# Patient Record
Sex: FEMALE | Race: WHITE | NOT HISPANIC OR LATINO | Employment: FULL TIME | URBAN - METROPOLITAN AREA
[De-identification: names, ages, dates, MRNs, and addresses within clinical notes are randomized per-mention and may not be internally consistent; named-entity substitution may affect disease eponyms.]

---

## 2017-03-13 ENCOUNTER — TELEPHONE (OUTPATIENT)
Dept: OBGYN | Facility: CLINIC | Age: 27
End: 2017-03-13

## 2017-03-13 NOTE — TELEPHONE ENCOUNTER
Pt is past due for fu pap smear  Reminder letter was sent 2/20/17  LMTC and schedule at [facility]  Left this writers number in case of questions  If no reply and/or appt within two weeks (3/27/17) patient will be considered lost to pap tracking f/u.  Rayne Patton,   Pap Tracking

## 2018-11-01 ENCOUNTER — TRANSFERRED RECORDS (OUTPATIENT)
Dept: HEALTH INFORMATION MANAGEMENT | Facility: CLINIC | Age: 28
End: 2018-11-01

## 2019-03-01 LAB — RETINOPATHY: NORMAL

## 2019-05-12 ENCOUNTER — HOSPITAL ENCOUNTER (EMERGENCY)
Facility: CLINIC | Age: 29
Discharge: HOME | End: 2019-05-12
Attending: EMERGENCY MEDICINE | Admitting: EMERGENCY MEDICINE
Payer: COMMERCIAL

## 2019-05-12 VITALS
HEART RATE: 84 BPM | BODY MASS INDEX: 38.41 KG/M2 | TEMPERATURE: 98 F | OXYGEN SATURATION: 99 % | SYSTOLIC BLOOD PRESSURE: 128 MMHG | RESPIRATION RATE: 16 BRPM | WEIGHT: 210 LBS | DIASTOLIC BLOOD PRESSURE: 84 MMHG

## 2019-05-12 DIAGNOSIS — N10 ACUTE PYELONEPHRITIS: ICD-10-CM

## 2019-05-12 LAB
ALBUMIN UR-MCNC: 30 MG/DL
ANION GAP SERPL CALCULATED.3IONS-SCNC: 6 MMOL/L (ref 3–14)
APPEARANCE UR: ABNORMAL
BACTERIA #/AREA URNS HPF: ABNORMAL /HPF
BASOPHILS # BLD AUTO: 0 10E9/L (ref 0–0.2)
BASOPHILS NFR BLD AUTO: 0.1 %
BILIRUB UR QL STRIP: NEGATIVE
BUN SERPL-MCNC: 13 MG/DL (ref 7–30)
CALCIUM SERPL-MCNC: 8.7 MG/DL (ref 8.5–10.1)
CHLORIDE SERPL-SCNC: 105 MMOL/L (ref 94–109)
CO2 SERPL-SCNC: 23 MMOL/L (ref 20–32)
COLOR UR AUTO: YELLOW
CREAT SERPL-MCNC: 0.58 MG/DL (ref 0.52–1.04)
DIFFERENTIAL METHOD BLD: ABNORMAL
EOSINOPHIL # BLD AUTO: 0.3 10E9/L (ref 0–0.7)
EOSINOPHIL NFR BLD AUTO: 2.8 %
ERYTHROCYTE [DISTWIDTH] IN BLOOD BY AUTOMATED COUNT: 12.3 % (ref 10–15)
GFR SERPL CREATININE-BSD FRML MDRD: >90 ML/MIN/{1.73_M2}
GLUCOSE SERPL-MCNC: 229 MG/DL (ref 70–99)
GLUCOSE UR STRIP-MCNC: >499 MG/DL
HCG UR QL: NEGATIVE
HCT VFR BLD AUTO: 41 % (ref 35–47)
HGB BLD-MCNC: 13.2 G/DL (ref 11.7–15.7)
HGB UR QL STRIP: ABNORMAL
IMM GRANULOCYTES # BLD: 0.1 10E9/L (ref 0–0.4)
IMM GRANULOCYTES NFR BLD: 0.6 %
KETONES UR STRIP-MCNC: 5 MG/DL
LEUKOCYTE ESTERASE UR QL STRIP: ABNORMAL
LYMPHOCYTES # BLD AUTO: 2.7 10E9/L (ref 0.8–5.3)
LYMPHOCYTES NFR BLD AUTO: 30 %
MCH RBC QN AUTO: 25.7 PG (ref 26.5–33)
MCHC RBC AUTO-ENTMCNC: 32.2 G/DL (ref 31.5–36.5)
MCV RBC AUTO: 80 FL (ref 78–100)
MONOCYTES # BLD AUTO: 0.6 10E9/L (ref 0–1.3)
MONOCYTES NFR BLD AUTO: 6.3 %
MUCOUS THREADS #/AREA URNS LPF: PRESENT /LPF
NEUTROPHILS # BLD AUTO: 5.4 10E9/L (ref 1.6–8.3)
NEUTROPHILS NFR BLD AUTO: 60.2 %
NITRATE UR QL: NEGATIVE
NRBC # BLD AUTO: 0 10*3/UL
NRBC BLD AUTO-RTO: 0 /100
PH UR STRIP: 5 PH (ref 5–7)
PLATELET # BLD AUTO: 326 10E9/L (ref 150–450)
POTASSIUM SERPL-SCNC: 3.9 MMOL/L (ref 3.4–5.3)
RBC # BLD AUTO: 5.13 10E12/L (ref 3.8–5.2)
RBC #/AREA URNS AUTO: 4 /HPF (ref 0–2)
SODIUM SERPL-SCNC: 134 MMOL/L (ref 133–144)
SOURCE: ABNORMAL
SP GR UR STRIP: 1.04 (ref 1–1.03)
SQUAMOUS #/AREA URNS AUTO: 35 /HPF (ref 0–1)
UROBILINOGEN UR STRIP-MCNC: 0 MG/DL (ref 0–2)
WBC # BLD AUTO: 9 10E9/L (ref 4–11)
WBC #/AREA URNS AUTO: 30 /HPF (ref 0–5)

## 2019-05-12 PROCEDURE — 99283 EMERGENCY DEPT VISIT LOW MDM: CPT | Mod: Z6 | Performed by: EMERGENCY MEDICINE

## 2019-05-12 PROCEDURE — 81025 URINE PREGNANCY TEST: CPT | Performed by: EMERGENCY MEDICINE

## 2019-05-12 PROCEDURE — 85025 COMPLETE CBC W/AUTO DIFF WBC: CPT | Performed by: EMERGENCY MEDICINE

## 2019-05-12 PROCEDURE — 99283 EMERGENCY DEPT VISIT LOW MDM: CPT

## 2019-05-12 PROCEDURE — 80048 BASIC METABOLIC PNL TOTAL CA: CPT | Performed by: EMERGENCY MEDICINE

## 2019-05-12 PROCEDURE — 81001 URINALYSIS AUTO W/SCOPE: CPT | Performed by: EMERGENCY MEDICINE

## 2019-05-12 RX ORDER — CEPHALEXIN 500 MG/1
500 CAPSULE ORAL 3 TIMES DAILY
Qty: 30 CAPSULE | Refills: 0 | Status: SHIPPED | OUTPATIENT
Start: 2019-05-12 | End: 2019-06-10

## 2019-05-12 NOTE — ED PROVIDER NOTES
History     Chief Complaint   Patient presents with     Flank Pain     pt presents with R flank pain, sx for 2 days, pain worse with mvt, no hx of kidney stones, denies UTI sx.     HPI  Leigh Foreman is a 28 year old female who presents for right flank pain for the past 2 days.  History obtained from the patient.  She reports that 2 days ago she started having dull pain in the right flank, hurts to move, feels better when she is laying still.  Occasionally radiates down towards the right buttock.  Pain is mild to moderate and has been improved with ibuprofen.  No nausea or vomiting.  She denies fevers or chills.  She reports mild irritation with urination but no urinary frequency.  No vaginal bleeding or discharge.  She has had a prior left oophorectomy.  No other abdominal surgeries.  She denies rash, chest pain, difficulty breathing.    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 Plan [place]-  4/3/15: [place] Bx & ECC - atypia. Plan cotest in 1 year.   03/29/17 Would consider patient to be lost to follow-up.       CARDIOVASCULAR SCREENING; LDL GOAL LESS THAN 160 10/31/2010     Priority: Medium     Pilonidal abscess 05/10/2010     Priority: Medium     ACNE 11/02/2006     Priority: Medium     November 2, 2006: education, hand-outs given, start Neutrogena facial products, Medications: Retin A, EES.       left ovarian serous cystadenoma. 07/31/2006     Priority: Medium     -Left ovarian mass found on exam and confirmed by u/s and CT.  -referral to gyn/onc [phone]. Dr. Price downs to laporascopy removal of Left ovary, mass, and fallopian tube. Pathology showed left ovarian serous cystadenoma. Started on A[place]se.          Past Medical History:    Past Medical History:   Diagnosis Date     ASCUS with  positive high risk HPV 1/27/2015     H/O colposcopy with cervical biopsy 4/2015       Past Surgical History:    Past Surgical History:   Procedure Laterality Date     CYSTECTOMY PILONIDAL  5/17/2012    Procedure:CYSTECTOMY PILONIDAL; Pilonidal cystectomy; Surgeon:PHONG STALEY; Location:[place] OR     INCISION AND DRAINAGE, ABSCESS, SIMPLE      pilonidal abscess     SURGICAL HISTORY OF -   07/2006    rt ovary removed       Family History:    Family History   Problem Relation Age of Onset     Diabetes Father      Hypertension Father      Cardiovascular Maternal Grandfather         MI 42     Diabetes Paternal Grandmother      Hypertension Paternal Grandmother        Social History:  Marital Status:  Single [1]  Social History     Tobacco Use     Smoking status: Never Smoker     Smokeless tobacco: Never Used   Substance Use Topics     Alcohol use: No     Drug use: No        Medications:      cephALEXin (KEFLEX) 500 MG capsule   azithromycin (ZITHROMAX Z-JUAN J) 250 MG tablet   benzonatate (TESSALON PERLES) 100 MG capsule   levonorgestrel-ethinyl estradiol (AVIANE,ALESSE,LESSINA) 0.1-20 MG-MCG per tablet         Review of Systems  Pertinent positives and negatives listed in the HPI, all other systems reviewed and are negative.    Physical Exam   BP: 132/88  Pulse: 90  Temp: 98  F (36.7  C)  Resp: 16  Weight: 95.3 kg (210 lb)  SpO2: 99 %      Physical Exam   Constitutional: She is oriented to person, place, and time. She appears well-developed and well-nourished. She appears distressed.   HENT:   Head: Normocephalic and atraumatic.   Right Ear: External ear normal.   Left Ear: External ear normal.   Nose: Nose normal.   Eyes: Conjunctivae are normal. No scleral icterus.   Neck: Normal range of motion.   Cardiovascular: Normal rate and regular rhythm.   Pulmonary/Chest: Effort normal. No stridor. No respiratory distress.   Abdominal: Soft. She exhibits no distension and no mass. There is no tenderness. There is no guarding.    Neurological: She is alert and oriented to person, place, and time.   Skin: Skin is warm and dry. She is not diaphoretic.   Psychiatric: She has a normal mood and affect. Her behavior is normal.   Nursing note and vitals reviewed.      ED Course        Procedures               Critical Care time:  none               Results for orders placed or performed during the hospital encounter of 05/12/19 (from the past 24 hour(s))   UA reflex to Microscopic   Result Value Ref Range    Color Urine Yellow     Appearance Urine Cloudy     Glucose Urine >499 (A) NEG^Negative mg/dL    Bilirubin Urine Negative NEG^Negative    Ketones Urine 5 (A) NEG^Negative mg/dL    Specific Gravity Urine 1.037 (H) 1.003 - 1.035    Blood Urine Moderate (A) NEG^Negative    pH Urine 5.0 5.0 - 7.0 pH    Protein Albumin Urine 30 (A) NEG^Negative mg/dL    Urobilinogen mg/dL 0.0 0.0 - 2.0 mg/dL    Nitrite Urine Negative NEG^Negative    Leukocyte Esterase Urine Moderate (A) NEG^Negative    Source Midstream Urine     RBC Urine 4 (H) 0 - 2 /HPF    WBC Urine 30 (H) 0 - 5 /HPF    Bacteria Urine Few (A) NEG^Negative /HPF    Squamous Epithelial /HPF Urine 35 (H) 0 - 1 /HPF    Mucous Urine Present (A) NEG^Negative /LPF   HCG qualitative urine   Result Value Ref Range    HCG Qual Urine Negative NEG^Negative   CBC with platelets differential   Result Value Ref Range    WBC 9.0 4.0 - 11.0 10e9/L    RBC Count 5.13 3.8 - 5.2 10e12/L    Hemoglobin 13.2 11.7 - 15.7 g/dL    Hematocrit 41.0 35.0 - 47.0 %    MCV 80 78 - 100 fl    MCH 25.7 (L) 26.5 - 33.0 pg    MCHC 32.2 31.5 - 36.5 g/dL    RDW 12.3 10.0 - 15.0 %    Platelet Count 326 150 - 450 10e9/L    Diff Method Automated Method     % Neutrophils 60.2 %    % Lymphocytes 30.0 %    % Monocytes 6.3 %    % Eosinophils 2.8 %    % Basophils 0.1 %    % Immature Granulocytes 0.6 %    Nucleated RBCs 0 0 /100    Absolute Neutrophil 5.4 1.6 - 8.3 10e9/L    Absolute Lymphocytes 2.7 0.8 - 5.3 10e9/L    Absolute Monocytes 0.6 0.0  - 1.3 10e9/L    Absolute Eosinophils 0.3 0.0 - 0.7 10e9/L    Absolute Basophils 0.0 0.0 - 0.2 10e9/L    Abs Immature Granulocytes 0.1 0 - 0.4 10e9/L    Absolute Nucleated RBC 0.0    Basic metabolic panel   Result Value Ref Range    Sodium 134 133 - 144 mmol/L    Potassium 3.9 3.4 - 5.3 mmol/L    Chloride 105 94 - 109 mmol/L    Carbon Dioxide 23 20 - 32 mmol/L    Anion Gap 6 3 - 14 mmol/L    Glucose 229 (H) 70 - 99 mg/dL    Urea Nitrogen 13 7 - 30 mg/dL    Creatinine 0.58 0.52 - 1.04 mg/dL    GFR Estimate >90 >60 mL/min/[1.73_m2]    GFR Estimate If Black >90 >60 mL/min/[1.73_m2]    Calcium 8.7 8.5 - 10.1 mg/dL       Medications - No data to display    Assessments & Plan (with Medical Decision Making)   28-year-old female presents with right flank pain.  Heart rate 90, temperature is 98  F, SPO2 is 99% on room air.  Abdominal exam is benign and not concerning for an acute surgical process such as appendicitis.  White blood cell count is 9.  Hemoglobin is 13.2.  Urine protein test is negative, unlikely ectopic pregnancy.  Urinalysis positive for white blood cells and leukocyte esterase.  Electrolytes are within normal limits.  On recheck she is feeling well and tolerating oral intake.  She possibly is having symptoms related to pyelonephritis given the white blood cells in the urine and the mild dysuria.  She is safe to discharge to home with medications for treatment of this.  She is started on cephalexin and is discharged with instructions to return if she has worsening symptoms or other concerns, otherwise follow-up in clinic.  The patient is in agreement with this plan.    I have reviewed the nursing notes.    I have reviewed the findings, diagnosis, plan and need for follow up with the patient.          Medication List      Started    cephALEXin 500 MG capsule  Commonly known as:  KEFLEX  500 mg, Oral, 3 TIMES DAILY            Final diagnoses:   Acute pyelonephritis       5/12/2019   [facility]  [facility]     Supa Peraza MD  05/12/19 0902

## 2019-05-12 NOTE — ED AVS SNAPSHOT
[facility] Emergency Department  [address] [address]  Phone:  [phone]  Fax:  [phone]                                    Leigh Foreman   MRN: [id number]    Department:  [facility] Emergency Department   Date of Visit:  5/12/2019           After Visit Summary Signature Page    I have received my discharge instructions, and my questions have been answered. I have discussed any challenges I see with this plan with the nurse or doctor.    ..........................................................................................................................................  Patient/Patient Representative Signature      ..........................................................................................................................................  Patient Representative Print Name and Relationship to Patient    ..................................................               ................................................  Date                                   Time    ..........................................................................................................................................  Reviewed by Signature/Title    ...................................................              ..............................................  Date                                               Time          22EPIC Rev 08/18

## 2019-05-12 NOTE — DISCHARGE INSTRUCTIONS
Take the antibiotics as prescribed for the next 10 days.  Use acetaminophen and ibuprofen for discomfort.  Return to the emergency department if you have repeated vomiting, severe symptoms, or other concerns.  Otherwise follow-up in clinic if not starting to improve and 4-5 days.

## 2019-05-12 NOTE — ED NOTES
"Onset on Friday  Intermittent right flank pain  Nausea no emesis  No diarrhea  Pain \" comes and goes \"  Bowel sounds present   Labs are sent  "

## 2019-06-06 ENCOUNTER — OFFICE VISIT (OUTPATIENT)
Dept: FAMILY MEDICINE | Facility: CLINIC | Age: 29
End: 2019-06-06
Payer: COMMERCIAL

## 2019-06-06 VITALS
HEIGHT: 64 IN | BODY MASS INDEX: 40.49 KG/M2 | HEART RATE: 98 BPM | DIASTOLIC BLOOD PRESSURE: 76 MMHG | OXYGEN SATURATION: 99 % | RESPIRATION RATE: 16 BRPM | TEMPERATURE: 98.7 F | WEIGHT: 237.2 LBS | SYSTOLIC BLOOD PRESSURE: 132 MMHG

## 2019-06-06 DIAGNOSIS — R81 GLUCOSE FOUND IN URINE ON EXAMINATION: ICD-10-CM

## 2019-06-06 DIAGNOSIS — Z87.448 HX OF ACUTE PYELONEPHRITIS: ICD-10-CM

## 2019-06-06 DIAGNOSIS — E11.9 TYPE 2 DIABETES MELLITUS WITHOUT COMPLICATION, WITHOUT LONG-TERM CURRENT USE OF INSULIN (H): ICD-10-CM

## 2019-06-06 DIAGNOSIS — R10.9 RIGHT FLANK PAIN: ICD-10-CM

## 2019-06-06 DIAGNOSIS — E11.9 DIABETES MELLITUS, NEW ONSET (H): Primary | ICD-10-CM

## 2019-06-06 LAB
ALBUMIN UR-MCNC: NEGATIVE MG/DL
APPEARANCE UR: CLEAR
BILIRUB UR QL STRIP: NEGATIVE
COLOR UR AUTO: YELLOW
GLUCOSE BLD-MCNC: 251 MG/DL (ref 70–99)
GLUCOSE UR STRIP-MCNC: >=1000 MG/DL
HBA1C MFR BLD: 9.3 % (ref 0–5.6)
HGB UR QL STRIP: NEGATIVE
KETONES UR STRIP-MCNC: NEGATIVE MG/DL
LEUKOCYTE ESTERASE UR QL STRIP: NEGATIVE
NITRATE UR QL: NEGATIVE
PH UR STRIP: 6 PH (ref 5–7)
SOURCE: ABNORMAL
SP GR UR STRIP: 1.02 (ref 1–1.03)
UROBILINOGEN UR STRIP-ACNC: 0.2 EU/DL (ref 0.2–1)

## 2019-06-06 PROCEDURE — 83036 HEMOGLOBIN GLYCOSYLATED A1C: CPT | Performed by: NURSE PRACTITIONER

## 2019-06-06 PROCEDURE — 82947 ASSAY GLUCOSE BLOOD QUANT: CPT | Performed by: NURSE PRACTITIONER

## 2019-06-06 PROCEDURE — 99215 OFFICE O/P EST HI 40 MIN: CPT | Performed by: NURSE PRACTITIONER

## 2019-06-06 PROCEDURE — 36415 COLL VENOUS BLD VENIPUNCTURE: CPT | Performed by: NURSE PRACTITIONER

## 2019-06-06 PROCEDURE — 81003 URINALYSIS AUTO W/O SCOPE: CPT | Performed by: NURSE PRACTITIONER

## 2019-06-06 PROCEDURE — 87086 URINE CULTURE/COLONY COUNT: CPT | Performed by: NURSE PRACTITIONER

## 2019-06-06 RX ORDER — METFORMIN HCL 500 MG
500 TABLET, EXTENDED RELEASE 24 HR ORAL
Qty: 60 TABLET | Refills: 0 | Status: SHIPPED | OUTPATIENT
Start: 2019-06-06 | End: 2019-06-07

## 2019-06-06 ASSESSMENT — MIFFLIN-ST. JEOR: SCORE: 1784.34

## 2019-06-06 NOTE — PROGRESS NOTES
"Subjective     Leigh Foreman is a 28 year old female who presents to clinic today for the following health issues:    HPI   URINARY TRACT SYMPTOMS      Duration: last night     Description  Flank pain     Intensity:  mild    Accompanying signs and symptoms:  Fever/chills: no   Flank pain YES- right   Nausea and vomiting: no   Vaginal symptoms: none  Abdominal/Pelvic Pain: no     History  History of frequent UTI's: YES- pyelonephritis on 5/12/19; pt was in the ED  History of kidney stones: no   Sexually Active: YES  Possibility of pregnancy: \"Possible but I doubt it\", as stated by the patient.     Precipitating or alleviating factors: None    Therapies tried and outcome: ibuprofen and Tylenol, icyhot         Patient Active Problem List   Diagnosis     left ovarian serous cystadenoma.     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     Diabetes mellitus, type 2 (H)     Past Surgical History:   Procedure Laterality Date     CYSTECTOMY PILONIDAL  5/17/2012    Procedure:CYSTECTOMY PILONIDAL; Pilonidal cystectomy; Surgeon:PHONG STALEY; Location:[place] OR     INCISION AND DRAINAGE, ABSCESS, SIMPLE      pilonidal abscess     SURGICAL HISTORY OF -   07/2006    rt ovary removed       Social History     Tobacco Use     Smoking status: Never Smoker     Smokeless tobacco: Never Used   Substance Use Topics     Alcohol use: No     Family History   Problem Relation Age of Onset     Diabetes Father      Hypertension Father      Cardiovascular Maternal Grandfather         MI 42     Diabetes Paternal Grandmother      Hypertension Paternal Grandmother          Current Outpatient Medications   Medication Sig Dispense Refill     metFORMIN (GLUCOPHAGE-XR) 500 MG 24 hr tablet Take 1 tablet (500 mg) by mouth daily (with dinner) 60 tablet 0     azithromycin (ZITHROMAX Z-JUAN J) 250 MG tablet Take 2 tablets on day 1 and then take 1 tablet days 2-5 (Patient not taking: " "Reported on 6/6/2019) 6 tablet 0     benzonatate (TESSALON PERLES) 100 MG capsule Take 1 capsule (100 mg) by mouth 3 times daily as needed for cough (Patient not taking: Reported on 6/6/2019) 42 capsule 0     levonorgestrel-ethinyl estradiol (AVIANE,ALESSE,LESSINA) 0.1-20 MG-MCG per tablet Take 1 tablet by mouth daily (Needs follow-up appointment for this medication in April) (Patient not taking: Reported on 6/6/2019) 90 tablet 0     Allergies   Allergen Reactions     Nkda [No Known Drug Allergies]          Reviewed and updated as needed this visit by Provider  Tobacco  Allergies  Meds  Problems  Med Hx  Surg Hx  Fam Hx         Review of Systems   ROS COMP: Constitutional, HEENT, cardiovascular, pulmonary, GI, , musculoskeletal, neuro, skin, endocrine and psych systems are negative, except as otherwise noted.      Objective    /76   Pulse 98   Temp 98.7  F (37.1  C) (Tympanic)   Resp 16   Ht 1.623 m (5' 3.9\")   Wt 107.6 kg (237 lb 3.2 oz)   LMP 05/22/2019 (Exact Date)   SpO2 99%   BMI 40.84 kg/m    Body mass index is 40.84 kg/m .  Physical Exam   GENERAL: 27 y/o overweight WF, alert and no distress, nontoxic in appearance  EYES: Eyes grossly normal to inspection, PERRL and conjunctivae and sclerae normal  HENT: ear canals and TM's normal, nose and mouth without ulcers or lesions  NECK: no adenopathy, supple with full ROM  RESP: lungs clear to auscultation - no rales, rhonchi or wheezes  CV: regular rate and rhythm, normal S1 S2, no S3 or S4, no murmur, click or rub, no peripheral edema and peripheral pulses strong  ABDOMEN: soft, nontender, no hepatosplenomegaly, no masses and bowel sounds normal  MS: no gross musculoskeletal defects noted, no edema  No rash    Diagnostic Test Results:  Labs reviewed in Epic  Results for orders placed or performed in visit on 06/06/19 (from the past 24 hour(s))   *UA reflex to Microscopic and Culture ([place] [facility] (except [place] and [place]) "   Result Value Ref Range    Color Urine Yellow     Appearance Urine Clear     Glucose Urine >=1000 (A) NEG^Negative mg/dL    Bilirubin Urine Negative NEG^Negative    Ketones Urine Negative NEG^Negative mg/dL    Specific Gravity Urine 1.020 1.003 - 1.035    Blood Urine Negative NEG^Negative    pH Urine 6.0 5.0 - 7.0 pH    Protein Albumin Urine Negative NEG^Negative mg/dL    Urobilinogen Urine 0.2 0.2 - 1.0 EU/dL    Nitrite Urine Negative NEG^Negative    Leukocyte Esterase Urine Negative NEG^Negative    Source Midstream Urine    Hemoglobin A1c   Result Value Ref Range    Hemoglobin A1C 9.3 (H) 0 - 5.6 %   Glucose, whole blood   Result Value Ref Range    Glucose Whole Blood 251 (H) 70 - 99 mg/dL           Assessment & Plan   Problem List Items Addressed This Visit     Diabetes mellitus, type 2 (H)    Relevant Medications    metFORMIN (GLUCOPHAGE-XR) 500 MG 24 hr tablet      Other Visit Diagnoses     Diabetes mellitus, new onset (H)    -  Primary    Relevant Medications    metFORMIN (GLUCOPHAGE-XR) 500 MG 24 hr tablet    Other Relevant Orders    DIABETES EDUCATOR REFERRAL    Right flank pain        Relevant Orders    *UA reflex to Microscopic and Culture ([place] [facility] (except [place] and [place]) (Completed)    Glucose found in urine on examination        Relevant Orders    Hemoglobin A1c (Completed)    Glucose, whole blood (Completed)    Hx of acute pyelonephritis        Relevant Orders    Urine Culture Aerobic Bacterial (Completed)         Discussed case with Enedina De Leon, diabetic educator and she can see her tomorrow. Will get her started on metformin tonight at dinner time (her creatinine normal in ER in May) and she will see Anette Fung NP, on Monday for follow up and further plan of care. Pt's father and grandmother are diabetic so she is a bit familiar with this disease process. We also discussed that weight loss can also help greatly with management of DM. Culturing urine just to make sure there  is nothing growing since it's been less than a month that she had pyelonephritis.  If her flank pain worsens she is to go immediately to the ER for further evaluation.      Patient Instructions   Start your Metformin as directed.    You are scheduled to see Enedina De Leon, Diabetic Educator, in [facility] tomorrow, 6-7-19 at 11:00 am. It is very important you see her for instructions and also for glucose monitor and instruction.    You are also scheduled to see Anette Fung NP, on Monday, 6-10-19 at 1:40 pm in the [facility].    Follow-up with your primary care provider next week and as needed.    Indications for emergent return to emergency department discussed with patient, who verbalized good understanding and agreement.  Patient understands the limitations of today's evaluation.         Patient Education     Diet: Diabetes  Food is an important tool that you can use to control diabetes and stay healthy. Eating well-balanced meals in the correct amounts will help you control your blood glucose levels and prevent low blood sugar reactions. It will also help you reduce the health risks of diabetes. There is no one specific diet that is right for everyone with diabetes. But there are general guidelines to follow. A registered dietitian (RD) will create a tailored diet approach that s just right for you. He or she will also help you plan healthy meals and snacks. If you have any questions, call your dietitian for advice.     Guidelines for success  Talk with your healthcare provider before starting a diabetes diet or weight loss program. If you haven't talked with a dietitian yet, ask your provider for a referral. The following guidelines can help you succeed:    Select foods from the 6 food groups below. Your dietitian will help you find food choices within each group. He or she will also show you serving sizes and how many servings you can have at each meal.  ? Grains, beans, and starchy  vegetables  ? Vegetables  ? Fruit  ? Milk or yogurt  ? Meat, poultry, fish, or tofu  ? Healthy fats    Check your blood sugar levels as directed by your provider. Take any medicine as prescribed by your provider.    Learn to read food labels and pick the right portion sizes.    Eat only the amount of food in your meal plan. Eat about the same amount of food at regular times each day. Don t skip meals. Eat meals 4 to 5 hours apart, with snacks in between.    Limit alcohol. It raises blood sugar levels. Drink water or calorie-free diet drinks that use safe sweeteners.    Eat less fat to help lower your risk of heart disease. Use nonfat or low-fat dairy products and lean meats. Avoid fried foods. Use cooking oils that are unsaturated, such as olive, canola, or peanut oil.    Talk with your dietitian about safe sugar substitutes.    Avoid added salt. It can contribute to high blood pressure, which can cause heart disease. People with diabetes already have a risk of high blood pressure and heart disease.    Stay at a healthy weight. If you need to lose weight, cut down on your portion sizes. But don t skip meals. Exercise is an important part of any weight management program. Talk with your provider about an exercise program that s right for you.    For more information about the best diet plan for you, talk with a registered dietitian (RD). To find an RD in your area, contact:  ? Academy of Nutrition and Dietetics www.eatright.org  ? The American Diabetes Association [phone] www.diabetes.org  Date Last Reviewed: 8/1/2016 2000-2018 [employer]. [address] [place] [address]. All rights reserved. This information is not intended as a substitute for professional medical care. Always follow your healthcare professional's instructions.             Return in about 1 day (around 6/7/2019) for diabetic educator.  45 minutes spent with this patient greater than 50% in face to face counseling  regarding the above.    PARAMJIT Redman [facility]

## 2019-06-06 NOTE — LETTER
June 6, 2019      Leigh Foreman  [address]  [place] [address]        To Whom It May Concern:    Leigh Foreman was seen in our clinic. Please release her from work tomorrow and Monday for medical appointments which are needed for health reasons.      Sincerely,        PARAMJIT Redman CNP

## 2019-06-06 NOTE — PATIENT INSTRUCTIONS
Start your Metformin as directed.    You are scheduled to see Enedina De Leon, Diabetic Educator, in [facility] tomorrow, 6-7-19 at 11:00 am. It is very important you see her for instructions and also for glucose monitor and instruction.    You are also scheduled to see Anette Fung NP, on Monday, 6-10-19 at 1:40 pm in the [facility].    Follow-up with your primary care provider next week and as needed.    Indications for emergent return to emergency department discussed with patient, who verbalized good understanding and agreement.  Patient understands the limitations of today's evaluation.         Patient Education     Diet: Diabetes  Food is an important tool that you can use to control diabetes and stay healthy. Eating well-balanced meals in the correct amounts will help you control your blood glucose levels and prevent low blood sugar reactions. It will also help you reduce the health risks of diabetes. There is no one specific diet that is right for everyone with diabetes. But there are general guidelines to follow. A registered dietitian (RD) will create a tailored diet approach that s just right for you. He or she will also help you plan healthy meals and snacks. If you have any questions, call your dietitian for advice.     Guidelines for success  Talk with your healthcare provider before starting a diabetes diet or weight loss program. If you haven't talked with a dietitian yet, ask your provider for a referral. The following guidelines can help you succeed:    Select foods from the 6 food groups below. Your dietitian will help you find food choices within each group. He or she will also show you serving sizes and how many servings you can have at each meal.  ? Grains, beans, and starchy vegetables  ? Vegetables  ? Fruit  ? Milk or yogurt  ? Meat, poultry, fish, or tofu  ? Healthy fats    Check your blood sugar levels as directed by your provider. Take any medicine as prescribed by your  provider.    Learn to read food labels and pick the right portion sizes.    Eat only the amount of food in your meal plan. Eat about the same amount of food at regular times each day. Don t skip meals. Eat meals 4 to 5 hours apart, with snacks in between.    Limit alcohol. It raises blood sugar levels. Drink water or calorie-free diet drinks that use safe sweeteners.    Eat less fat to help lower your risk of heart disease. Use nonfat or low-fat dairy products and lean meats. Avoid fried foods. Use cooking oils that are unsaturated, such as olive, canola, or peanut oil.    Talk with your dietitian about safe sugar substitutes.    Avoid added salt. It can contribute to high blood pressure, which can cause heart disease. People with diabetes already have a risk of high blood pressure and heart disease.    Stay at a healthy weight. If you need to lose weight, cut down on your portion sizes. But don t skip meals. Exercise is an important part of any weight management program. Talk with your provider about an exercise program that s right for you.    For more information about the best diet plan for you, talk with a registered dietitian (RD). To find an RD in your area, contact:  ? Academy of Nutrition and Dietetics www.eatright.org  ? The American Diabetes Association 321-446-1937 www.diabetes.org  Date Last Reviewed: 8/1/2016 2000-2018 [employer]. [address] [place] [address], [place] [address]. All rights reserved. This information is not intended as a substitute for professional medical care. Always follow your healthcare professional's instructions.

## 2019-06-06 NOTE — NURSING NOTE
"Chief Complaint   Patient presents with     UTI       Initial /76   Pulse 98   Temp 98.7  F (37.1  C) (Tympanic)   Resp 16   Ht 1.623 m (5' 3.9\")   Wt 107.6 kg (237 lb 3.2 oz)   LMP 05/22/2019 (Exact Date)   SpO2 99%   BMI 40.84 kg/m   Estimated body mass index is 40.84 kg/m  as calculated from the following:    Height as of this encounter: 1.623 m (5' 3.9\").    Weight as of this encounter: 107.6 kg (237 lb 3.2 oz).    Patient presents to the clinic using No DME    Health Maintenance that is potentially due pending provider review:  Pap Smear    Notified patient of due pap. Patient will follow up in the future.    Is there anyone who you would like to be able to receive your results? No  If yes have patient fill out HALIE      "

## 2019-06-07 ENCOUNTER — OFFICE VISIT (OUTPATIENT)
Dept: EDUCATION SERVICES | Facility: CLINIC | Age: 29
End: 2019-06-07
Payer: COMMERCIAL

## 2019-06-07 DIAGNOSIS — E11.9 DIABETES MELLITUS, NEW ONSET (H): ICD-10-CM

## 2019-06-07 DIAGNOSIS — E11.9 TYPE 2 DIABETES MELLITUS WITHOUT COMPLICATION, WITHOUT LONG-TERM CURRENT USE OF INSULIN (H): Primary | ICD-10-CM

## 2019-06-07 LAB
BACTERIA SPEC CULT: NORMAL
Lab: NORMAL
SPECIMEN SOURCE: NORMAL

## 2019-06-07 PROCEDURE — G0108 DIAB MANAGE TRN  PER INDIV: HCPCS | Performed by: DIETITIAN, REGISTERED

## 2019-06-07 RX ORDER — LANCETS
EACH MISCELLANEOUS
Qty: 102 EACH | Refills: 11 | Status: SHIPPED | OUTPATIENT
Start: 2019-06-07

## 2019-06-07 RX ORDER — METFORMIN HCL 500 MG
TABLET, EXTENDED RELEASE 24 HR ORAL
Qty: 270 TABLET | Refills: 1 | Status: SHIPPED | OUTPATIENT
Start: 2019-06-07 | End: 2019-06-07

## 2019-06-07 RX ORDER — METFORMIN HCL 500 MG
TABLET, EXTENDED RELEASE 24 HR ORAL
Qty: 270 TABLET | Refills: 1 | Status: SHIPPED | OUTPATIENT
Start: 2019-06-07 | End: 2019-07-23

## 2019-06-07 NOTE — PROGRESS NOTES
"Diabetes Self-Management Education & Support    Diabetes Education Self Management & Training    SUBJECTIVE/OBJECTIVE:  Presents for: Initial Assessment for new diagnosis  Accompanied by: Self  Diabetes education in the past 24mo: No  Focus of Visit: Patient Unsure  Diabetes type: Type 2  Date of diagnosis: yesterday 6/6/19  Transportation concerns: No  Other concerns:: None  Cultural Influences/Ethnic Background:  American      Diabetes Symptoms & Complications          Patient Problem List and Family Medical History reviewed for relevant medical history, current medical status, and diabetes risk factors.    Vitals:  LMP 05/22/2019 (Exact Date)   Estimated body mass index is 40.84 kg/m  as calculated from the following:    Height as of 6/6/19: 1.623 m (5' 3.9\").    Weight as of 6/6/19: 107.6 kg (237 lb 3.2 oz).   Last 3 BP:   BP Readings from Last 3 Encounters:   06/06/19 132/76   05/12/19 128/84   09/25/16 128/79       History   Smoking Status     Never Smoker   Smokeless Tobacco     Never Used       Labs:  Lab Results   Component Value Date    A1C 9.3 06/06/2019     Lab Results   Component Value Date     05/12/2019     No results found for: LDL  No results found for: HDL]  GFR Estimate   Date Value Ref Range Status   05/12/2019 >90 >60 mL/min/[1.73_m2] Final     Comment:     Non  GFR Calc  Starting 12/18/2018, serum creatinine based estimated GFR (eGFR) will be   calculated using the Chronic Kidney Disease Epidemiology Collaboration   (CKD-EPI) equation.       GFR Estimate If Black   Date Value Ref Range Status   05/12/2019 >90 >60 mL/min/[1.73_m2] Final     Comment:      GFR Calc  Starting 12/18/2018, serum creatinine based estimated GFR (eGFR) will be   calculated using the Chronic Kidney Disease Epidemiology Collaboration   (CKD-EPI) equation.       Lab Results   Component Value Date    CR 0.58 05/12/2019     No results found for: MICROALBUMIN    Healthy Eating  Healthy " Eating Assessed Today: Yes  Cultural/Quaker diet restrictions?: No  Breakfast: usually dont eat anything or a small bowl of cereal or a poptart  Lunch: salad or a sandwich or taco, water  Dinner: eggs and canales or chicken, asparagus or steak with broccoli. makes noodles occasionally, baked occasionally, skim milk 12 oz or water   Snacks: chips and salsa, string cheese  Other: 1 can every day or every 2  Beverages: Water, Milk  Has patient met with a dietitian in the past?: No    Being Active  Being Active Assessed Today: Yes  Exercise:: Yes(walking, has a gym membership)  Days per week of moderate to strenuous exercise (like a brisk walk): 2  On average, minutes per day of exercise at this level: 60  How intense was your typical exercise? : Moderate (like brisk walking)  Exercise Minutes per Week: 120  Barrier to exercise: None    Monitoring  Monitoring Assessed Today: Yes    187 fasting this am    Taking Medications  Diabetes Medication(s)     Biguanides       metFORMIN (GLUCOPHAGE-XR) 500 MG 24 hr tablet    Take 1 tablet (500 mg) by mouth daily (with dinner)          Taking Medication Assessed Today: Yes    Problem Solving          Not assessed       Reducing Risks   not assessed    Healthy Coping     Patient Activation Measure Survey Score:  ESTUARDO Score (Last Two) 1/15/2015   ESTUARDO Raw Score 36   Activation Score 47.4   ESTUARDO Level 2       ASSESSMENT:  New diabetic.  Basic information covered, meal planning.  She is just getting used to dx, was yesterday dx'd.         Patient's most recent   Lab Results   Component Value Date    A1C 9.3 06/06/2019    is not meeting goal of <7.0    INTERVENTION:   Diabetes knowledge and skills assessment:     Patient is knowledgeable in diabetes management concepts related to: new    Patient needs further education on the following diabetes management concepts: Healthy Eating, Being Active, Monitoring, Taking Medication, Problem Solving, Reducing Risks and Healthy Coping    Based on  learning assessment above, most appropriate setting for further diabetes education would be: Individual setting.    Education provided today on:  AADE Self-Care Behaviors:  Healthy Eating: carbohydrate counting, consistency in amount, composition, and timing of food intake and label reading  Being Active: relationship to blood glucose and describe appropriate activity program  Monitoring: purpose, proper technique, log and interpret results, individual blood glucose targets, frequency of monitoring and use of glucose control solution  Taking Medication: action of prescribed medication, side effects of prescribed medications and when to take medications    Opportunities for ongoing education and support in diabetes-self management were discussed.    Pt verbalized understanding of concepts discussed and recommendations provided today.       Education Materials Provided:  Janny Taking Charge of Your Diabetes Book, BG Log Sheet, Carbohydrate Counting and Accu-Chek Guide meter kit    PLAN:  See Patient Instructions for co-developed, patient-stated behavior change goals.  AVS printed and provided to patient today. See Follow-Up section for recommended follow-up.      Time Spent: 60 minutes  Encounter Type: Individual    Any diabetes medication dose changes were made via the CDE Protocol and Collaborative Practice Agreement with the patient's primary care provider. A copy of this encounter was shared with the provider.

## 2019-06-07 NOTE — PATIENT INSTRUCTIONS
1.  Goal for blood sugars:  mg/dL premeal or fasting.  After meal (2 hours post) ideally under 140.    2.  Watch carbohydrates: 2-3 at meals, 1-2 at snacks.    3.  2 times at the gym a week is your goal.    4.  After one week of being on 500 mg increase to 1 tablet at breakfast and 1 tablet at supper.  In another week increase to 1 tablet at breakfast and 2 tablets at supper.

## 2019-06-09 ENCOUNTER — HOSPITAL ENCOUNTER (EMERGENCY)
Facility: CLINIC | Age: 29
Discharge: HOME | End: 2019-06-09
Attending: FAMILY MEDICINE | Admitting: FAMILY MEDICINE
Payer: COMMERCIAL

## 2019-06-09 ENCOUNTER — APPOINTMENT (OUTPATIENT)
Dept: CT IMAGING | Facility: CLINIC | Age: 29
End: 2019-06-09
Attending: FAMILY MEDICINE
Payer: COMMERCIAL

## 2019-06-09 VITALS
BODY MASS INDEX: 40.46 KG/M2 | DIASTOLIC BLOOD PRESSURE: 81 MMHG | WEIGHT: 237 LBS | HEIGHT: 64 IN | TEMPERATURE: 98.1 F | HEART RATE: 87 BPM | RESPIRATION RATE: 16 BRPM | OXYGEN SATURATION: 99 % | SYSTOLIC BLOOD PRESSURE: 125 MMHG

## 2019-06-09 DIAGNOSIS — R10.9 FLANK PAIN: ICD-10-CM

## 2019-06-09 DIAGNOSIS — N39.0 URINARY TRACT INFECTION WITHOUT HEMATURIA, SITE UNSPECIFIED: ICD-10-CM

## 2019-06-09 LAB
ALBUMIN SERPL-MCNC: 3.7 G/DL (ref 3.4–5)
ALBUMIN UR-MCNC: NEGATIVE MG/DL
ALP SERPL-CCNC: 78 U/L (ref 40–150)
ALT SERPL W P-5'-P-CCNC: 69 U/L (ref 0–50)
ANION GAP SERPL CALCULATED.3IONS-SCNC: 7 MMOL/L (ref 3–14)
APPEARANCE UR: ABNORMAL
AST SERPL W P-5'-P-CCNC: 36 U/L (ref 0–45)
BASOPHILS # BLD AUTO: 0 10E9/L (ref 0–0.2)
BASOPHILS NFR BLD AUTO: 0.1 %
BILIRUB DIRECT SERPL-MCNC: 0.2 MG/DL (ref 0–0.2)
BILIRUB SERPL-MCNC: 0.9 MG/DL (ref 0.2–1.3)
BILIRUB UR QL STRIP: NEGATIVE
BUN SERPL-MCNC: 13 MG/DL (ref 7–30)
CALCIUM SERPL-MCNC: 8.7 MG/DL (ref 8.5–10.1)
CHLORIDE SERPL-SCNC: 106 MMOL/L (ref 94–109)
CO2 SERPL-SCNC: 25 MMOL/L (ref 20–32)
COLOR UR AUTO: YELLOW
CREAT SERPL-MCNC: 0.59 MG/DL (ref 0.52–1.04)
DIFFERENTIAL METHOD BLD: ABNORMAL
EOSINOPHIL # BLD AUTO: 0.2 10E9/L (ref 0–0.7)
EOSINOPHIL NFR BLD AUTO: 2.5 %
ERYTHROCYTE [DISTWIDTH] IN BLOOD BY AUTOMATED COUNT: 12.7 % (ref 10–15)
GFR SERPL CREATININE-BSD FRML MDRD: >90 ML/MIN/{1.73_M2}
GLUCOSE SERPL-MCNC: 214 MG/DL (ref 70–99)
GLUCOSE UR STRIP-MCNC: >499 MG/DL
HCG SERPL QL: NEGATIVE
HCT VFR BLD AUTO: 40 % (ref 35–47)
HGB BLD-MCNC: 12.3 G/DL (ref 11.7–15.7)
HGB UR QL STRIP: NEGATIVE
IMM GRANULOCYTES # BLD: 0.1 10E9/L (ref 0–0.4)
IMM GRANULOCYTES NFR BLD: 0.6 %
KETONES UR STRIP-MCNC: 5 MG/DL
LEUKOCYTE ESTERASE UR QL STRIP: ABNORMAL
LIPASE SERPL-CCNC: 133 U/L (ref 73–393)
LYMPHOCYTES # BLD AUTO: 2.1 10E9/L (ref 0.8–5.3)
LYMPHOCYTES NFR BLD AUTO: 24.7 %
MCH RBC QN AUTO: 24.8 PG (ref 26.5–33)
MCHC RBC AUTO-ENTMCNC: 30.8 G/DL (ref 31.5–36.5)
MCV RBC AUTO: 81 FL (ref 78–100)
MONOCYTES # BLD AUTO: 0.5 10E9/L (ref 0–1.3)
MONOCYTES NFR BLD AUTO: 6.2 %
MUCOUS THREADS #/AREA URNS LPF: PRESENT /LPF
NEUTROPHILS # BLD AUTO: 5.6 10E9/L (ref 1.6–8.3)
NEUTROPHILS NFR BLD AUTO: 65.9 %
NITRATE UR QL: NEGATIVE
NRBC # BLD AUTO: 0 10*3/UL
NRBC BLD AUTO-RTO: 0 /100
PH UR STRIP: 5 PH (ref 5–7)
PLATELET # BLD AUTO: 275 10E9/L (ref 150–450)
POTASSIUM SERPL-SCNC: 4 MMOL/L (ref 3.4–5.3)
PROT SERPL-MCNC: 7.2 G/DL (ref 6.8–8.8)
RBC # BLD AUTO: 4.96 10E12/L (ref 3.8–5.2)
RBC #/AREA URNS AUTO: 1 /HPF (ref 0–2)
SODIUM SERPL-SCNC: 138 MMOL/L (ref 133–144)
SOURCE: ABNORMAL
SP GR UR STRIP: 1.02 (ref 1–1.03)
SQUAMOUS #/AREA URNS AUTO: 14 /HPF (ref 0–1)
UROBILINOGEN UR STRIP-MCNC: 0 MG/DL (ref 0–2)
WBC # BLD AUTO: 8.5 10E9/L (ref 4–11)
WBC #/AREA URNS AUTO: 13 /HPF (ref 0–5)

## 2019-06-09 PROCEDURE — 87086 URINE CULTURE/COLONY COUNT: CPT | Performed by: FAMILY MEDICINE

## 2019-06-09 PROCEDURE — 80076 HEPATIC FUNCTION PANEL: CPT | Performed by: FAMILY MEDICINE

## 2019-06-09 PROCEDURE — 76705 ECHO EXAM OF ABDOMEN: CPT | Performed by: FAMILY MEDICINE

## 2019-06-09 PROCEDURE — 84703 CHORIONIC GONADOTROPIN ASSAY: CPT | Performed by: FAMILY MEDICINE

## 2019-06-09 PROCEDURE — 76705 ECHO EXAM OF ABDOMEN: CPT | Mod: 26 | Performed by: FAMILY MEDICINE

## 2019-06-09 PROCEDURE — 99285 EMERGENCY DEPT VISIT HI MDM: CPT | Mod: 25 | Performed by: FAMILY MEDICINE

## 2019-06-09 PROCEDURE — 81001 URINALYSIS AUTO W/SCOPE: CPT | Performed by: FAMILY MEDICINE

## 2019-06-09 PROCEDURE — 83690 ASSAY OF LIPASE: CPT | Performed by: FAMILY MEDICINE

## 2019-06-09 PROCEDURE — 80048 BASIC METABOLIC PNL TOTAL CA: CPT | Performed by: FAMILY MEDICINE

## 2019-06-09 PROCEDURE — 85025 COMPLETE CBC W/AUTO DIFF WBC: CPT | Performed by: FAMILY MEDICINE

## 2019-06-09 PROCEDURE — 74176 CT ABD & PELVIS W/O CONTRAST: CPT

## 2019-06-09 RX ORDER — SULFAMETHOXAZOLE/TRIMETHOPRIM 800-160 MG
1 TABLET ORAL 2 TIMES DAILY
Qty: 14 TABLET | Refills: 0 | Status: SHIPPED | OUTPATIENT
Start: 2019-06-09 | End: 2019-07-23

## 2019-06-09 ASSESSMENT — ENCOUNTER SYMPTOMS
FEVER: 0
SINUS PRESSURE: 0
COUGH: 1
FREQUENCY: 1
DIAPHORESIS: 0
SHORTNESS OF BREATH: 0
SORE THROAT: 0
ABDOMINAL PAIN: 0
DIARRHEA: 1
WHEEZING: 0
VOMITING: 0
HEADACHES: 0
NAUSEA: 0
PALPITATIONS: 0
CHILLS: 0
CONSTIPATION: 0
FLANK PAIN: 1
BLOOD IN STOOL: 0
DYSURIA: 0

## 2019-06-09 ASSESSMENT — MIFFLIN-ST. JEOR: SCORE: 1785.02

## 2019-06-09 NOTE — DISCHARGE INSTRUCTIONS
ICD-10-CM    1. Flank pain R10.9 UA reflex to Microscopic     Urine Culture Aerobic Bacterial   2. Urinary tract infection without hematuria, site unspecified N39.0     take septra twice daily for 7 days. return for fever, worsening.  await urine culture.

## 2019-06-09 NOTE — ED PROVIDER NOTES
History   No chief complaint on file.    The history is provided by the patient.     Leigh Foreman is a 28 year old female w/hx of abnormal pap smear, left ovarian serous cystadenoma, and left oophorectomy presents to the ED complaining of right flank pain starting Thursday (3 days ago). Patient was seen in the ED on 5/12/19 after presenting with dull right flank for 2 days that was worse with movement and relieved by ibuprofen. She had no vaginal bleeding or discharge at that time and her UA showed large glucose, 30 WBC, and 4 RBC so she was discharged with acute pyelonephritis and prescribed keflex. Her symptoms resolved and she had a follow up in the clinic on 6/6/19 which showed her hyperglycemic, but her urine was clear. She was also diagnosed with new onset type 2 diabetes at that time and started on metformin. Patient notes the new flank pain feels similar to when she was diagnosed withpyelonephritis.   She also notes she has been c/o urinary frequency likely related to urinary frequency.  mild cough.  diarrhea since starting metformin. Her LMP was on 5/22 and she doesn't use contraceptives.   Patient denies hx shingles or rash in the region of the flank.  no trauma or overuse to the area.         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 Plan [place]-  4/3/15: [place] Bx & ECC - atypia. Plan [facility]est in 1 year.   03/29/17 Would consider patient to be lost to follow-up.       CARDIOVASCULAR SCREENING; LDL GOAL LESS THAN 160 10/31/2010     Priority: Medium     Pilonidal abscess 05/10/2010     Priority: Medium     ACNE 11/02/2006     Priority: Medium     November 2, 2006: education, hand-outs given, start Neutrogena facial products,  Medications: Retin A, EES.       left ovarian serous cystadenoma. 07/31/2006     Priority: Medium     -Left ovarian mass found on exam and confirmed by u/s and CT.  -referral to gyn/onc [phone]. Dr. Price downs to laporascopy removal of Left ovary, mass, and fallopian tube. Pathology showed left ovarian serous cystadenoma. Started on [place].          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 1/27/2015     H/O colposcopy with cervical biopsy 4/2015       Past Surgical History:    Past Surgical History:   Procedure Laterality Date     CYSTECTOMY PILONIDAL  5/17/2012    Procedure:CYSTECTOMY PILONIDAL; Pilonidal cystectomy; Surgeon:PHONG STALEY; Location:[place] OR     INCISION AND DRAINAGE, ABSCESS, SIMPLE      pilonidal abscess     SURGICAL HISTORY OF -   07/2006    rt ovary removed       Family History:    Family History   Problem Relation Age of Onset     Diabetes Father      Hypertension Father      Cardiovascular Maternal Grandfather         MI 42     Diabetes Paternal Grandmother      Hypertension Paternal Grandmother        Social History:  Marital Status:   [2]  Social History     Tobacco Use     Smoking status: Never Smoker     Smokeless tobacco: Never Used   Substance Use Topics     Alcohol use: No     Drug use: No        Medications:      azithromycin (ZITHROMAX Z-JUAN J) 250 MG tablet   benzonatate (TESSALON PERLES) 100 MG capsule   blood glucose (ACCU-CHEK GUIDE) test strip   blood glucose monitoring (ACCU-CHEK FASTCLIX) lancets   levonorgestrel-ethinyl estradiol (AVIANE,ALESSE,LESSINA) 0.1-20 MG-MCG per tablet   metFORMIN (GLUCOPHAGE-XR) 500 MG 24 hr tablet         Review of Systems   Constitutional: Negative for chills, diaphoresis and fever.   HENT: Negative for ear pain, sinus pressure and sore throat.    Eyes: Negative for visual disturbance.   Respiratory: Positive for cough. Negative for shortness of breath and wheezing.   "  Cardiovascular: Negative for chest pain and palpitations.   Gastrointestinal: Positive for diarrhea. Negative for abdominal pain, blood in stool, constipation, nausea and vomiting.   Genitourinary: Positive for flank pain (right) and frequency. Negative for dysuria and urgency.   Skin: Negative for rash.   Neurological: Negative for headaches.   All other systems reviewed and are negative.      Physical Exam   BP: 125/81  Pulse: 87  Temp: 98.1  F (36.7  C)  Resp: 16  Height: 162.6 cm (5' 4\")  Weight: 107.5 kg (237 lb)  SpO2: 99 %      Physical Exam   Constitutional: She is oriented to person, place, and time. She appears well-developed and well-nourished. No distress.   HENT:   Head: Normocephalic and atraumatic.   Eyes: EOM are normal. Right eye exhibits no discharge. Left eye exhibits no discharge. No scleral icterus.   Neck: Normal range of motion. Neck supple.   Cardiovascular: Normal rate, regular rhythm and normal heart sounds.   No murmur heard.  Pulmonary/Chest: Effort normal and breath sounds normal. No respiratory distress.   Abdominal: Soft. Bowel sounds are normal. She exhibits distension (mild). There is no tenderness. There is no rebound, no guarding and no CVA tenderness.   Overweight.   Musculoskeletal: Normal range of motion. She exhibits no edema, tenderness or deformity.   Neurological: She is alert and oriented to person, place, and time.   Skin: Skin is warm and dry. No rash noted. She is not diaphoretic. No erythema. No pallor.   Psychiatric: She has a normal mood and affect. Her behavior is normal.   Nursing note and vitals reviewed.    minimal RT CVA discomfort, no lat dorsi region tenderness     ED Course        Procedures    Results for orders placed during the hospital encounter of 06/09/19   POC US ABDOMEN LIMITED    Impression [facility] Procedure Note    Limited Bedside ED Renal Ultrasound:    PERFORMED BY: Dr. Scooter Ortega  INDICATIONS:  Flank Pain  PROBE: Low frequency " convex probe  BODY LOCATION:  Abdomen  FINDINGS:  The ultrasound was performed with longitudinal and transverse views.   Right Kidney:   Hydronephrosis:  Small   Renal cyst:  None  Left Kidney:   Hydronephrosis:  None   Renal cyst:  None  INTERPRETATION:  Right kidney demonstrates hydronephrosis - minimal.  IMAGE DOCUMENTATION: Images were archived to PACs system.          Critical Care time:  none       Results for orders placed or performed during the hospital encounter of 06/09/19 (from the past 24 hour(s))   CBC with platelets differential   Result Value Ref Range    WBC 8.5 4.0 - 11.0 10e9/L    RBC Count 4.96 3.8 - 5.2 10e12/L    Hemoglobin 12.3 11.7 - 15.7 g/dL    Hematocrit 40.0 35.0 - 47.0 %    MCV 81 78 - 100 fl    MCH 24.8 (L) 26.5 - 33.0 pg    MCHC 30.8 (L) 31.5 - 36.5 g/dL    RDW 12.7 10.0 - 15.0 %    Platelet Count 275 150 - 450 10e9/L    Diff Method Automated Method     % Neutrophils 65.9 %    % Lymphocytes 24.7 %    % Monocytes 6.2 %    % Eosinophils 2.5 %    % Basophils 0.1 %    % Immature Granulocytes 0.6 %    Nucleated RBCs 0 0 /100    Absolute Neutrophil 5.6 1.6 - 8.3 10e9/L    Absolute Lymphocytes 2.1 0.8 - 5.3 10e9/L    Absolute Monocytes 0.5 0.0 - 1.3 10e9/L    Absolute Eosinophils 0.2 0.0 - 0.7 10e9/L    Absolute Basophils 0.0 0.0 - 0.2 10e9/L    Abs Immature Granulocytes 0.1 0 - 0.4 10e9/L    Absolute Nucleated RBC 0.0    Basic metabolic panel   Result Value Ref Range    Sodium 138 133 - 144 mmol/L    Potassium 4.0 3.4 - 5.3 mmol/L    Chloride 106 94 - 109 mmol/L    Carbon Dioxide 25 20 - 32 mmol/L    Anion Gap 7 3 - 14 mmol/L    Glucose 214 (H) 70 - 99 mg/dL    Urea Nitrogen 13 7 - 30 mg/dL    Creatinine 0.59 0.52 - 1.04 mg/dL    GFR Estimate >90 >60 mL/min/[1.73_m2]    GFR Estimate If Black >90 >60 mL/min/[1.73_m2]    Calcium 8.7 8.5 - 10.1 mg/dL   Hepatic panel   Result Value Ref Range    Bilirubin Direct 0.2 0.0 - 0.2 mg/dL    Bilirubin Total 0.9 0.2 - 1.3 mg/dL    Albumin 3.7 3.4 - 5.0  g/dL    Protein Total 7.2 6.8 - 8.8 g/dL    Alkaline Phosphatase 78 40 - 150 U/L    ALT 69 (H) 0 - 50 U/L    AST 36 0 - 45 U/L   Lipase   Result Value Ref Range    Lipase 133 73 - 393 U/L   HCG qualitative   Result Value Ref Range    HCG Qualitative Serum Negative NEG^Negative   POC US ABDOMEN LIMITED    Impression    [facility] Procedure Note    Limited Bedside ED Renal Ultrasound:    PERFORMED BY: Dr. Scooter Ortega  INDICATIONS:  Flank Pain  PROBE: Low frequency convex probe  BODY LOCATION:  Abdomen  FINDINGS:  The ultrasound was performed with longitudinal and transverse views.   Right Kidney:   Hydronephrosis:  Small   Renal cyst:  None  Left Kidney:   Hydronephrosis:  None   Renal cyst:  None  INTERPRETATION:  Right kidney demonstrates hydronephrosis - minimal.  IMAGE DOCUMENTATION: Images were archived to PACs system.     UA reflex to Microscopic   Result Value Ref Range    Color Urine Yellow     Appearance Urine Slightly Cloudy     Glucose Urine >499 (A) NEG^Negative mg/dL    Bilirubin Urine Negative NEG^Negative    Ketones Urine 5 (A) NEG^Negative mg/dL    Specific Gravity Urine 1.022 1.003 - 1.035    Blood Urine Negative NEG^Negative    pH Urine 5.0 5.0 - 7.0 pH    Protein Albumin Urine Negative NEG^Negative mg/dL    Urobilinogen mg/dL 0.0 0.0 - 2.0 mg/dL    Nitrite Urine Negative NEG^Negative    Leukocyte Esterase Urine Moderate (A) NEG^Negative    Source Midstream Urine     RBC Urine 1 0 - 2 /HPF    WBC Urine 13 (H) 0 - 5 /HPF    Squamous Epithelial /HPF Urine 14 (H) 0 - 1 /HPF    Mucous Urine Present (A) NEG^Negative /LPF   Urine Culture Aerobic Bacterial   Result Value Ref Range    Specimen Description Midstream Urine     Special Requests Specimen received in preservative     Culture Micro PENDING    Abd/pelvis CT - no contrast - Stone Protocol    Narrative    CT ABDOMEN PELVIS WITHOUT CONTRAST 6/9/2019 9:59 AM    TECHNIQUE: Images from diaphragm to pubic symphysis  noncontrast  technique  Radiation dose for this scan was reduced using automated exposure  control, adjustment of the mA and/or kV according to patient size, or  iterative reconstruction technique.    HISTORY: abnormal urine with recurrent UTI, flank pain, mild  hydronephrosis right sided on ultrasound - eval for infected stone    COMPARISON: 7/6/2006 CT abdomen and pelvis    FINDINGS:   Abdomen and Pelvis: No evidence for hydronephrosis or urinary tract  calculi. Within the limits of a noncontrast exam no focal worrisome  lesions identified in the kidneys, gallbladder, liver, spleen or  pancreas or adrenal glands. The lung bases are clear. No periaortic or  pelvic adenopathy. No free fluid or acute appearing bowel abnormality.  Normal appendix. No frankly aggressive bone lesions identified.      Impression    IMPRESSION: No urinary tract calculi, no perinephric stranding or  convincing hydronephrosis. No acute appearing abnormality.                 GEOFFREY CONNORS MD       Medications - No data to display    Assessments & Plan (with Medical Decision Making)     MDM: Leigh Foreman is a 28 year old female who presented with a right flank pain developing over the last week with a recent history of pyelonephritis with similar pain in the right kidney.  Urinalysis normal a few days ago and afebrile.  No known history of ureterolithiasis and a bedside ultrasound with very minimal hydronephrosis seen on the right side compared to the left.  We have performed urinalysis again and as this demonstrated a mixed picture possible urinary tract infection with her flank pain I recommended that we pursue CT to confirm no infected stone.  This was negative for ureterolithiasis.  Urinalysis again is mixed and may not represent urinary tract infection but given her current symptoms I recommend we start antibiotics with Septra twice daily 7 days while awaiting urine culture.  Precautions are given for return.    I have reviewed  the nursing notes.    I have reviewed the findings, diagnosis, plan and need for follow up with the patient.       Medication List      There are no discharge medications for this visit.         Final diagnoses:   Flank pain   Urinary tract infection without hematuria, site unspecified - take septra twice daily for 7 days. return for fever, worsening.  await urine culture.     This document serves as a record of services personally performed by Scooter Ortega MD. It was created on their behalf by Rubia Zhang, a trained medical scribe. The creation of this record is based on the provider's personal observations and the statements of the patient. This document has been checked and approved by the attending provider.    Disclaimer: This note consists of symbols derived from keyboarding, dictation and/or voice recognition software. As a result, there may be errors in the script that have gone undetected. Please consider this when interpreting information found in this chart.    6/9/2019   [facility] EMERGENCY DEPARTMENT     Scooter Ortega MD  06/09/19 [id number]

## 2019-06-09 NOTE — ED AVS SNAPSHOT
[facility] Emergency Department  [address] [address]  Phone:  [phone]  Fax:  [phone]                                    Leigh Foreman   MRN: [id number]    Department:  [facility] Emergency Department   Date of Visit:  6/9/2019           After Visit Summary Signature Page    I have received my discharge instructions, and my questions have been answered. I have discussed any challenges I see with this plan with the nurse or doctor.    ..........................................................................................................................................  Patient/Patient Representative Signature      ..........................................................................................................................................  Patient Representative Print Name and Relationship to Patient    ..................................................               ................................................  Date                                   Time    ..........................................................................................................................................  Reviewed by Signature/Title    ...................................................              ..............................................  Date                                               Time          22EPIC Rev 08/18

## 2019-06-09 NOTE — ED TRIAGE NOTES
Uti  (kindey infection) in May. Took antibx for 10 days and it cleared but she also found out she has new onset Diabetes. Today she feels the UTI is back. States her Blood sugar was 207 this morning, she did not treat. Pain in the right flank area.

## 2019-06-10 ENCOUNTER — OFFICE VISIT (OUTPATIENT)
Dept: FAMILY MEDICINE | Facility: CLINIC | Age: 29
End: 2019-06-10
Payer: COMMERCIAL

## 2019-06-10 VITALS
WEIGHT: 237 LBS | TEMPERATURE: 98.9 F | BODY MASS INDEX: 40.46 KG/M2 | DIASTOLIC BLOOD PRESSURE: 80 MMHG | HEIGHT: 64 IN | HEART RATE: 68 BPM | SYSTOLIC BLOOD PRESSURE: 132 MMHG

## 2019-06-10 DIAGNOSIS — E11.9 TYPE 2 DIABETES MELLITUS WITHOUT COMPLICATION, WITHOUT LONG-TERM CURRENT USE OF INSULIN (H): Primary | ICD-10-CM

## 2019-06-10 PROBLEM — E66.01 MORBID OBESITY (H): Status: ACTIVE | Noted: 2019-06-10

## 2019-06-10 PROCEDURE — 99213 OFFICE O/P EST LOW 20 MIN: CPT | Performed by: NURSE PRACTITIONER

## 2019-06-10 ASSESSMENT — MIFFLIN-ST. JEOR: SCORE: 1785.02

## 2019-06-10 NOTE — PATIENT INSTRUCTIONS
Patient Education     Diet: Diabetes  Food is an important tool that you can use to control diabetes and stay healthy. Eating well-balanced meals in the correct amounts will help you control your blood glucose levels and prevent low blood sugar reactions. It will also help you reduce the health risks of diabetes. There is no one specific diet that is right for everyone with diabetes. But there are general guidelines to follow. A registered dietitian (RD) will create a tailored diet approach that s just right for you. He or she will also help you plan healthy meals and snacks. If you have any questions, call your dietitian for advice.     Guidelines for success  Talk with your healthcare provider before starting a diabetes diet or weight loss program. If you haven't talked with a dietitian yet, ask your provider for a referral. The following guidelines can help you succeed:    Select foods from the 6 food groups below. Your dietitian will help you find food choices within each group. He or she will also show you serving sizes and how many servings you can have at each meal.  ? Grains, beans, and starchy vegetables  ? Vegetables  ? Fruit  ? Milk or yogurt  ? Meat, poultry, fish, or tofu  ? Healthy fats    Check your blood sugar levels as directed by your provider. Take any medicine as prescribed by your provider.    Learn to read food labels and pick the right portion sizes.    Eat only the amount of food in your meal plan. Eat about the same amount of food at regular times each day. Don t skip meals. Eat meals 4 to 5 hours apart, with snacks in between.    Limit alcohol. It raises blood sugar levels. Drink water or calorie-free diet drinks that use safe sweeteners.    Eat less fat to help lower your risk of heart disease. Use nonfat or low-fat dairy products and lean meats. Avoid fried foods. Use cooking oils that are unsaturated, such as olive, canola, or peanut oil.    Talk with your dietitian about safe sugar  substitutes.    Avoid added salt. It can contribute to high blood pressure, which can cause heart disease. People with diabetes already have a risk of high blood pressure and heart disease.    Stay at a healthy weight. If you need to lose weight, cut down on your portion sizes. But don t skip meals. Exercise is an important part of any weight management program. Talk with your provider about an exercise program that s right for you.    For more information about the best diet plan for you, talk with a registered dietitian (RD). To find an RD in your area, contact:  ? Academy of Nutrition and Dietetics www.eatright.org  ? The American Diabetes Association [phone] www.diabetes.org  Date Last Reviewed: 8/1/2016 2000-2018 [employer]. [address] [place] [address]. All rights reserved. This information is not intended as a substitute for professional medical care. Always follow your healthcare professional's instructions.           Patient Education     Eating Out When You Have Diabetes    Eating right is an important part of keeping your blood sugar in your target range. You just need to make healthy choices.  Tips for restaurant meals  When you eat away from home try these tips:    Try to schedule your dining-out meal at your normal meal time. Make a reservation if possible, so you don't have to wait to eat. If you can't make a reservation, try to arrive at the restaurant at a less-busy time to cut down your wait time. Eat a small fruit or starch snack at your regular mealtime if your restaurant meal is going to be later than usual.     Call ahead to see if the restaurant can meet your dietary needs if you've never been there before. Or you can go online to see the menu ahead of time.    Carry some crackers with you in case the restaurant needs you to wait until you can be served.    Ask how foods are prepared before you order.    Instead of fried, sautéed, or breaded foods, choose  ones that are broiled, steamed, grilled, or baked.    Ask for sauces, gravies, and dressings on the side.    Only eat an amount that fits your meal plan. Remember: You can take home the leftovers.    Save dessert for special occasions. Then choose a small dessert or share one with a friend or family member.  Make healthy choices  Fast food    Garden salad with light dressing on the side    Baked potato with vegetables or herbs    Broiled, roasted, or grilled chicken sandwich    Sliced turkey or lean roast beef sandwich  Mexican    Chicken enchilada, without cheese or sour cream     Small burrito with whole beans and chicken    Whole beans (not refried) and rice    Chicken or fish fajitas  Steakhouse    Grilled or broiled lean cuts of beef    Baked potato with vegetables or herbs    Broiled or baked chicken. Don t eat the skin.    Steamed vegetables  Asian    Steamed dumplings or potstickers    Broiled, boiled, or steamed meats or fish    Sushi or sashimi    Steamed rice or boiled noodles. One serving is equal to 1/3 cup.  Date Last Reviewed: 6/1/2016 2000-2018 [employer]. [address] [place] [address]. All rights reserved. This information is not intended as a substitute for professional medical care. Always follow your healthcare professional's instructions.           Patient Education     Diabetes: Meal Planning    You can help keep your blood sugar level in your target range by eating healthy foods. Your healthcare team can help you create a low-fat, nutritious meal plan. Take an active role in your diabetes management by following your meal plan and working with your healthcare team.  Make your meal plan  A meal plan gives guidelines for the types and amounts of food you should eat. The goal is to balance food and insulin (or other diabetes medications) so your blood sugars will be in your target range. Your dietitian will help you make a flexible meal plan that includes many foods  that you like.  Watch serving sizes  Your meal plan will group foods by servings. To learn how much a serving is, start by measuring food portions at each meal. Soon you ll know what a serving looks like on your plate. Ask your healthcare provider about how to balance servings of different foods.  Eat from all the food groups  The basis of a healthy meal plan is variety (eating lots of different foods). Choose lean meats, fresh fruits and vegetables, whole grains, and low-fat or nonfat dairy products. Eating a wide variety of foods provides the nutrients your body needs. It can also keep you from getting bored with your meal plan.  Learn about carbohydrates, fats, and protein    Carbohydrates are starches, sugars, and fiber. They are found in many foods, including fruit, bread, pasta, milk, and sweets. Of all the foods you eat, carbohydrates have the most effect on your blood sugar. Your dietitian may teach you about carb counting, a way to figure out the number of carbohydrates in a meal.    Fats have the most calories. They also have the most effect on your weight and your risk of heart disease. When you have diabetes, it s important to control your weight and protect your heart. Foods that are high in fat include whole milk, cheese, snack foods, and desserts.    Protein is important for building and repairing muscles and bones. Choose low-fat protein sources, such as fish, egg whites, and skinless chicken.  Reduce liquid sugars  Extra calories from sodas, sports drinks, and fruit drinks make it hard to keep blood sugar in range. Cut as many liquid sugars from your meal plan as you can.  This includes most fruit juices, which are often high in natural or added sugar. Instead, drink plenty of water and other sugar-free beverages.  Eat less fat  If you need to lose weight, try to reduce the amount of fat in your diet. This can also help lower your cholesterol level to keep blood vessels healthier. Cut fat by using  only small amounts of liquid oil for cooking. Read food labels carefully to avoid foods with unhealthy trans fats.  Timing your meals  When it comes to blood sugar control, when you eat is as important as what you eat. You may need to eat several small meals spaced evenly throughout the day to stay in your target range. So don t skip breakfast or wait until late in the day to get most of your calories. Doing so can cause your blood sugar to rise too high or fall too low.   Date Last Reviewed: 3/1/2016    4623-1841 [employer]. [address] [place] [address]. All rights reserved. This information is not intended as a substitute for professional medical care. Always follow your healthcare professional's instructions.           Patient Education     Diabetes: Ways to Take Medicine  There are many kinds of diabetes medicines. Some medicines can be swallowed. Others have to be injected. Otherwise, they would be broken down in the stomach before reaching the bloodstream. Some medicines, such as insulin, can be taken in more than one way. The main ways of taking medicine for diabetes are shown below.    Oral medicines (pills)        Injections (shots given using a syringe or pen-like device)        Insulin pumps (devices that can deliver a steady amount of insulin 24 hours a day)        Inhalation devices (devices that deliver insulin by inhaling it)       Date Last Reviewed: 10/1/2016    9148-3639 [employer]. [address] [place] [address]. All rights reserved. This information is not intended as a substitute for professional medical care. Always follow your healthcare professional's instructions.

## 2019-06-10 NOTE — PROGRESS NOTES
Subjective     Leigh Foreman is a 28 year old female who presents to clinic today for the following health issues:    HPI   Diabetes Follow-up      How often are you checking your blood sugar? One time daily    What time of day are you checking your blood sugars (select all that apply)?  Before meals and Before and after meals    Have you had any blood sugars above 200?  Yes 207    Have you had any blood sugars below 70?  No    What symptoms do you notice when your blood sugar is low?  None    What concerns do you have today about your diabetes? None     Do you have any of these symptoms? (Select all that apply)  No numbness or tingling in feet.  No redness, sores or blisters on feet.  No complaints of excessive thirst.  No reports of blurry vision.  No significant changes to weight.     Have you had a diabetic eye exam in the last 12 months? Yes- Date of last eye exam: last fall    BP Readings from Last 2 Encounters:   06/09/19 125/81   06/06/19 132/76     Hemoglobin A1C (%)   Date Value   06/06/2019 9.3 (H)       Diabetes Management Resources    Amount of exercise or physical activity: 2 days a week    Problems taking medications regularly: No    Medication side effects: none    Diet: regular (no restrictions)      Patient Active Problem List   Diagnosis     left ovarian serous cystadenoma.     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     Diabetes mellitus, type 2 (H)     Past Surgical History:   Procedure Laterality Date     CYSTECTOMY PILONIDAL  5/17/2012    Procedure:CYSTECTOMY PILONIDAL; Pilonidal cystectomy; Surgeon:PHONG STALEY; Location:[facility]     INCISION AND DRAINAGE, ABSCESS, SIMPLE      pilonidal abscess     SURGICAL HISTORY OF -   07/2006    rt ovary removed       Social History     Tobacco Use     Smoking status: Never Smoker     Smokeless tobacco: Never Used   Substance Use Topics     Alcohol use: No     Family  "History   Problem Relation Age of Onset     Diabetes Father      Hypertension Father      Cardiovascular Maternal Grandfather         MI 42     Diabetes Paternal Grandmother      Hypertension Paternal Grandmother          Current Outpatient Medications   Medication Sig Dispense Refill     blood glucose monitoring (ACCU-CHEK FASTCLIX) lancets Use to test blood sugar 1 times daily or as directed. 102 each 11     metFORMIN (GLUCOPHAGE-XR) 500 MG 24 hr tablet Take 1 tablet at supper for 1 week, increase to 1 tab at breakfast, 1 tab at supper, 1 week increase to 1 tab at breakfast, 2 tabs at supper. 270 tablet 1     sulfamethoxazole-trimethoprim (BACTRIM DS/SEPTRA DS) 800-160 MG tablet Take 1 tablet by mouth 2 times daily for 7 days 14 tablet 0     blood glucose (ACCU-CHEK GUIDE) test strip Use to test blood sugar 1 times daily or as directed. 100 each 11     Allergies   Allergen Reactions     Nkda [No Known Drug Allergies]      Recent Labs   Lab Test 06/09/19  0826 06/06/19  1223 05/12/19  0833   A1C  --  9.3*  --    ALT 69*  --   --    CR 0.59  --  0.58   GFRESTIMATED >90  --  >90   GFRESTBLACK >90  --  >90   POTASSIUM 4.0  --  3.9      BP Readings from Last 3 Encounters:   06/10/19 132/80   06/09/19 125/81   06/06/19 132/76    Wt Readings from Last 3 Encounters:   06/10/19 107.5 kg (237 lb)   06/09/19 107.5 kg (237 lb)   06/06/19 107.6 kg (237 lb 3.2 oz)                    Reviewed and updated as needed this visit by Provider         Review of Systems   ROS COMP: Constitutional, HEENT, cardiovascular, pulmonary, gi and gu systems are negative, except as otherwise noted.      Objective    LMP 05/22/2019 (Exact Date)  Vital signs:  Temp: 98.9  F (37.2  C) Temp src: Tympanic BP: 132/80 Pulse: 68           Height: 162.6 cm (5' 4\") Weight: 107.5 kg (237 lb)  Estimated body mass index is 40.68 kg/m  as calculated from the following:    Height as of this encounter: 1.626 m (5' 4\").    Weight as of this encounter: 107.5 kg " "(237 lb).          Body mass index is 40.68 kg/m .  Physical Exam   GENERAL: healthy, alert and no distress  EYES: Eyes grossly normal to inspection, PERRL and conjunctivae and sclerae normal  HENT: ear canals and TM's normal, nose and mouth without ulcers or lesions  NECK: no adenopathy, no asymmetry, masses, or scars and thyroid normal to palpation  RESP: lungs clear to auscultation - no rales, rhonchi or wheezes  CV: regular rate and rhythm, normal S1 S2, no S3 or S4, no murmur, click or rub, no peripheral edema and peripheral pulses strong  ABDOMEN: soft, nontender, no hepatosplenomegaly, no masses and bowel sounds normal  MS: no gross musculoskeletal defects noted, no edema  SKIN: no suspicious lesions or rashes  NEURO: Normal strength and tone, mentation intact and speech normal  PSYCH: mentation appears normal, affect normal/bright         Assessment & Plan     (E11.9) Type 2 diabetes mellitus without complication, without long-term current use of insulin (H)  (primary encounter diagnosis)  Comment: Patient has new onset type 2 diabetes started on metformin.  Patient will follow-up with diabetic educator recently started on metformin recommend rechecking hemoglobin A1c in 6 to 8 weeks lab only appointment  Plan: DIABETES EDUCATOR REFERRAL             BMI:   Estimated body mass index is 40.68 kg/m  as calculated from the following:    Height as of this encounter: 1.626 m (5' 4\").    Weight as of this encounter: 107.5 kg (237 lb).   Weight management plan: Discussed healthy diet and exercise guidelines        See Patient Instructions    Return in about 6 weeks (around 7/22/2019) for Lab Work.    PARAMJIT [facility]      "

## 2019-06-11 PROBLEM — A49.1 GBS (GROUP B STREPTOCOCCUS) INFECTION: Status: ACTIVE | Noted: 2019-06-11

## 2019-06-11 LAB
BACTERIA SPEC CULT: ABNORMAL
BACTERIA SPEC CULT: ABNORMAL
Lab: ABNORMAL
SPECIMEN SOURCE: ABNORMAL

## 2019-06-11 NOTE — RESULT ENCOUNTER NOTE
Final urine culture report is NEGATIVE per [facility] Lab Result protocol.    If NEGATIVE result, no change in treatment, per [facility] Lab Result protocol.

## 2019-06-18 ENCOUNTER — TELEPHONE (OUTPATIENT)
Dept: FAMILY MEDICINE | Facility: CLINIC | Age: 29
End: 2019-06-18

## 2019-06-18 NOTE — TELEPHONE ENCOUNTER
Notify patient the urine culture was negative.  If symptoms have resolved can stop antibiotics and does not need a repeat urine.  If symptoms persist patient should be reseen.    Anette Fung CNP

## 2019-06-18 NOTE — TELEPHONE ENCOUNTER
Reason for Call:  Other     Detailed comments: [facility] was in [facility] for [facility] and was told to get repeat labs to make sure it went away - please call pt    Phone Number Patient can be reached at: Cell number on file:    Telephone Information:   Mobile [phone]       Best Time:     Can we leave a detailed message on this number? YES    Call taken on 6/18/2019 at 2:01 PM by Mikaela Anderson

## 2019-06-19 ENCOUNTER — DOCUMENTATION ONLY (OUTPATIENT)
Dept: FAMILY MEDICINE | Facility: CLINIC | Age: 29
End: 2019-06-19

## 2019-06-19 DIAGNOSIS — E11.9 TYPE 2 DIABETES MELLITUS WITHOUT COMPLICATION, WITHOUT LONG-TERM CURRENT USE OF INSULIN (H): Primary | ICD-10-CM

## 2019-06-19 NOTE — PROGRESS NOTES
This patient is coming in for a UA on Friday, June 21, 2019. There is no order in place. Please place lab orders she may need. Thank you.

## 2019-06-21 ENCOUNTER — ALLIED HEALTH/NURSE VISIT (OUTPATIENT)
Dept: EDUCATION SERVICES | Facility: CLINIC | Age: 29
End: 2019-06-21
Payer: COMMERCIAL

## 2019-06-21 VITALS — WEIGHT: 236 LBS | BODY MASS INDEX: 40.51 KG/M2

## 2019-06-21 DIAGNOSIS — E11.9 TYPE 2 DIABETES MELLITUS WITHOUT COMPLICATION, WITHOUT LONG-TERM CURRENT USE OF INSULIN (H): Primary | ICD-10-CM

## 2019-06-21 PROCEDURE — G0108 DIAB MANAGE TRN  PER INDIV: HCPCS | Performed by: DIETITIAN, REGISTERED

## 2019-06-21 NOTE — PATIENT INSTRUCTIONS
1.  Yearly urine test to check kidneys, lipids (cholesterol) yearly, A1c is done every 3-6 months with goal of under 7%, dentist twice a year, eye exam yearly tell them you have diabetes, foot care (wear shoes, moisturize, look at feet)    2.  Increase the metformin to 1 in am and 1 in pm with meals.  IF they are still outside of goal ranges in two weeks go up to 1 at breakfast and 2 at supper.    3.  Stay active and keep counting carbs

## 2019-06-21 NOTE — PROGRESS NOTES
"Diabetes Self-Management Education & Support    Diabetes Education Self Management & Training    SUBJECTIVE/OBJECTIVE:  Presents for: Individual review  Accompanied by: Self  Diabetes education in the past 24mo: (P) No  Focus of Visit: Healthy Coping, Healthy Eating, Problem Solving, Being Active, Diabetes Pathophysiology  Diabetes type: (P) Type 2  Disease course: (P) Stable  How confident are you filling out medical forms by yourself:: Not Assessed  Transportation concerns: No  Other concerns:: None  Cultural Influences/Ethnic Background:  American      Diabetes Symptoms & Complications  Blurred vision: (P) No  Fatigue: (P) No  Foot ulcerations: (P) No  Polydipsia: (P) No  Polyphagia: (P) No  Polyuria: (P) Yes  Visual change: (P) No  Weakness: (P) No  Weight loss: (P) No  Slow healing wounds: (P) No  Weight trend: (P) Stable  Autonomic neuropathy: (P) No  CVA: (P) No  Heart disease: (P) No  Nephropathy: (P) No  Peripheral neuropathy: (P) No  Peripheral Vascular Disease: (P) No  Retinopathy: (P) No  Sexual dysfunction: (P) No    Patient Problem List and Family Medical History reviewed for relevant medical history, current medical status, and diabetes risk factors.    Vitals:  Wt 107 kg (236 lb)   LMP 05/22/2019 (Exact Date)   BMI 40.51 kg/m    Estimated body mass index is 40.51 kg/m  as calculated from the following:    Height as of 6/10/19: 1.626 m (5' 4\").    Weight as of this encounter: 107 kg (236 lb).   Last 3 BP:   BP Readings from Last 3 Encounters:   06/10/19 132/80   06/09/19 125/81   06/06/19 132/76       History   Smoking Status     Never Smoker   Smokeless Tobacco     Never Used       Labs:  Lab Results   Component Value Date    A1C 9.3 06/06/2019     Lab Results   Component Value Date     06/09/2019     No results found for: LDL  No results found for: HDL]  GFR Estimate   Date Value Ref Range Status   06/09/2019 >90 >60 mL/min/[1.73_m2] Final     Comment:     Non  GFR " Calc  Starting 12/18/2018, serum creatinine based estimated GFR (eGFR) will be   calculated using the Chronic Kidney Disease Epidemiology Collaboration   (CKD-EPI) equation.       GFR Estimate If Black   Date Value Ref Range Status   06/09/2019 >90 >60 mL/min/[1.73_m2] Final     Comment:      GFR Calc  Starting 12/18/2018, serum creatinine based estimated GFR (eGFR) will be   calculated using the Chronic Kidney Disease Epidemiology Collaboration   (CKD-EPI) equation.       Lab Results   Component Value Date    CR 0.59 06/09/2019     No results found for: MICROALBUMIN    Healthy Eating  Cultural/Taoist diet restrictions?: (P) No  Meal planning: (P) Avoiding sweets, Carbohydrate counting, Smaller portions  Meals include: (P) Breakfast, Lunch, Dinner, Snacks  Breakfast: fruit grain bars (18 grams)  Lunch: sandwich meat and cheese (30g) or salad with tuna, water  Dinner: tator tot hotdish 1 cup-1.5 cup (30-45g), diet dew or corn dog (30), salad  Snacks: doing protein bars (21 grams), peanuts  Beverages: Water, Diet soda  Has patient met with a dietitian in the past?: (P) No    Being Active  Being Active Assessed Today: Yes  Exercise:: Yes  Days per week of moderate to strenuous exercise (like a brisk walk): 2  On average, minutes per day of exercise at this level: 90  How intense was your typical exercise? : (P) Heavy (like jogging or swimming  Exercise Minutes per Week: 180  Barrier to exercise: (P) Time    Monitoring  Blood Glucose Meter: (P) Accu-check  Home Glucose (Sugar) Monitoring: (P) 1-2 times per day  Blood glucose trend: (P) Fluctuating minimally  Low Glucose Range (mg/dL): (P) 110-130  High Glucose Range (mg/dL): (P) 180-200  Overall Range (mg/dL): (P) 140-180    Am: higher in am: 150-170 range  Evening 130's    Taking Medications  Diabetes Medication(s)     Biguanides       metFORMIN (GLUCOPHAGE-XR) 500 MG 24 hr tablet    Take 1 tablet at supper for 1 week, increase to 1 tab at breakfast,  1 tab at supper, 1 week increase to 1 tab at breakfast, 2 tabs at supper.          Current Treatments: (P) Oral Agent (monotherapy)    Problem Solving  Hypoglycemia Frequency: (P) Never  Hypoglycemia Treatment: (P) Juice  Patient carries a carbohydrate source: (P) No  Medical alert: (P) No  Severe weather/disaster plan for diabetes management?: (P) No  DKA prevention plan?: (P) No  Sick day plan for diabetes management?: (P) No              Reducing Risks  CAD Risks: (P) Family history, Obesity  Has dilated eye exam at least once a year?: (P) Yes  Sees dentist every 6 months?: (P) Yes  Sees podiatrist (foot doctor)?: (P) No    Healthy Coping  Informal Support system:: (P) Children, Tatiana based, Family, Friends, Parent, Spouse  Difficulty affording diabetes management supplies?: (P) No  Patient Activation Measure Survey Score:  ESTUARDO Score (Last Two) 1/15/2015   ESTUARDO Raw Score 36   Activation Score 47.4   ESTUARDO Level 2       ASSESSMENT:  Has not increased the metformin yet will do that tomorrow morning.  Is working on her meal plan, has switched to diet pop, is working on activity.        Patient's most recent   Lab Results   Component Value Date    A1C 9.3 06/06/2019    is not meeting goal of <7.0    INTERVENTION:   Diabetes knowledge and skills assessment:     Patient is knowledgeable in diabetes management concepts related to: Healthy Eating, Being Active and Monitoring    Patient needs further education on the following diabetes management concepts: Healthy Eating, Being Active, Monitoring, Taking Medication, Problem Solving, Reducing Risks and Healthy Coping    Based on learning assessment above, most appropriate setting for further diabetes education would be: Individual setting.    Education provided today on:  AADE Self-Care Behaviors:  Reducing Risks: major complications of diabetes, prevention, early diagnostic measures and treatment of complications, foot care, appropriate dental care, annual eye exam, smoking  cessation, aspirin therapy, A1C - goals, relating to blood glucose levels, how often to check, lipids levels and goals and blood pressure and goals    Opportunities for ongoing education and support in diabetes-self management were discussed.    Pt verbalized understanding of concepts discussed and recommendations provided today.       Education Materials Provided:  No new materials provided today    PLAN:  See Patient Instructions for co-developed, patient-stated behavior change goals.  AVS printed and provided to patient today. See Follow-Up section for recommended follow-up.      Time Spent: 30 minutes  Encounter Type: Individual    Any diabetes medication dose changes were made via the CDE Protocol and Collaborative Practice Agreement with the patient's primary care provider. A copy of this encounter was shared with the provider.

## 2019-06-24 ENCOUNTER — TELEPHONE (OUTPATIENT)
Dept: FAMILY MEDICINE | Facility: CLINIC | Age: 29
End: 2019-06-24

## 2019-06-24 NOTE — TELEPHONE ENCOUNTER
Panel Management Review      Patient has the following on her problem list:     Diabetes    ASA: Not Required     Last A1C  Lab Results   Component Value Date    A1C 9.3 06/06/2019     A1C tested: FAILED    Last LDL:    No results found for: CHOL  No results found for: HDL  No results found for: LDL  No results found for: TRIG  No results found for: CHOLHDLRATIO  No results found for: NHDL    Is the patient on a Statin? NO             Is the patient on Aspirin? NO        Last three blood pressure readings:  BP Readings from Last 3 Encounters:   06/10/19 132/80   06/09/19 125/81   06/06/19 132/76       Date of last diabetes office visit: 6/10/19     Tobacco History:     History   Smoking Status     Never Smoker   Smokeless Tobacco     Never Used           Composite cancer screening  Chart review shows that this patient is due/due soon for the following Pap Smear  Summary:    Patient is due/failing the following:   Eye exam, microalbumin, TSH, LDL and PAP    Action needed:   Patient needs office visit for a physical with pap smear and labs.    Type of outreach:    Phone, left message for patient to call back.     Questions for provider review:    None                                                                                                                                    Ro Trivedi MA      Chart routed to Care Team .

## 2019-07-16 DIAGNOSIS — E11.9 DIABETES MELLITUS, NEW ONSET (H): ICD-10-CM

## 2019-07-16 RX ORDER — METFORMIN HCL 500 MG
TABLET, EXTENDED RELEASE 24 HR ORAL
Qty: 60 TABLET | Refills: 2 | Status: SHIPPED | OUTPATIENT
Start: 2019-07-16 | End: 2019-07-23

## 2019-07-16 NOTE — TELEPHONE ENCOUNTER
"Requested Prescriptions   Pending Prescriptions Disp Refills     metFORMIN (GLUCOPHAGE-XR) 500 MG 24 hr tablet [Pharmacy Med Name: METFORMIN HCL ER 500MG TB24] 60 tablet 0     Sig: TAKE ONE TABLET BY MOUTH ONCE DAILY WITH DINNER       Biguanide Agents Failed - 7/16/2019  2:33 PM        Failed - Patient has documented LDL within the past 12 mos.     No lab results found.          Failed - Patient has had a Microalbumin in the past 15 mos.     No lab results found.          Passed - Blood pressure less than 140/90 in past 6 months     BP Readings from Last 3 Encounters:   06/10/19 132/80   06/09/19 125/81   06/06/19 132/76                 Passed - Patient is age 10 or older        Passed - Patient has documented A1c within the specified period of time.     If HgbA1C is 8 or greater, it needs to be on file within the past 3 months.  If less than 8, must be on file within the past 6 months.     Recent Labs   Lab Test 06/06/19  1223   A1C 9.3*             Passed - Patient's CR is NOT>1.4 OR Patient's EGFR is NOT<45 within past 12 mos.     Recent Labs   Lab Test 06/09/19  0826   GFRESTIMATED >90   GFRESTBLACK >90       Recent Labs   Lab Test 06/09/19  0826   CR 0.59             Passed - Patient does NOT have a diagnosis of CHF.        Passed - Medication is active on med list        Passed - Patient is not pregnant        Passed - Patient has not had a positive pregnancy test within the past 12 mos.         Passed - Recent (6 mo) or future (30 days) visit within the authorizing provider's specialty     Patient had office visit in the last 6 months or has a visit in the next 30 days with authorizing provider or within the authorizing provider's specialty.  See \"Patient Info\" tab in inbasket, or \"Choose Columns\" in Meds & Orders section of the refill encounter.            Last Written Prescription Date:  6/7/19  Last Fill Quantity: 270,  # refills: 1   Last office visit: 6/10/2019 with prescribing provider:     Future " Office Visit:   Next 5 appointments (look out 90 days)    Jul 23, 2019  8:00 AM CDT  PHYSICAL with PARAMJIT Martell CNP  [facility] ([facility]) [address] [place] [id number] [phone]

## 2019-07-23 ENCOUNTER — RESULT FOLLOW UP (OUTPATIENT)
Dept: FAMILY MEDICINE | Facility: CLINIC | Age: 29
End: 2019-07-23

## 2019-07-23 ENCOUNTER — OFFICE VISIT (OUTPATIENT)
Dept: FAMILY MEDICINE | Facility: CLINIC | Age: 29
End: 2019-07-23
Payer: COMMERCIAL

## 2019-07-23 VITALS
DIASTOLIC BLOOD PRESSURE: 80 MMHG | WEIGHT: 236 LBS | BODY MASS INDEX: 40.29 KG/M2 | HEART RATE: 76 BPM | RESPIRATION RATE: 18 BRPM | SYSTOLIC BLOOD PRESSURE: 126 MMHG | HEIGHT: 64 IN | TEMPERATURE: 98.5 F

## 2019-07-23 DIAGNOSIS — E11.9 DIABETES MELLITUS, NEW ONSET (H): ICD-10-CM

## 2019-07-23 DIAGNOSIS — R87.610 ASCUS WITH POSITIVE HIGH RISK HPV CERVICAL: ICD-10-CM

## 2019-07-23 DIAGNOSIS — Z00.00 ROUTINE GENERAL MEDICAL EXAMINATION AT A HEALTH CARE FACILITY: Primary | ICD-10-CM

## 2019-07-23 DIAGNOSIS — R87.810 ASCUS WITH POSITIVE HIGH RISK HPV CERVICAL: ICD-10-CM

## 2019-07-23 LAB
CHOLEST SERPL-MCNC: 183 MG/DL
CREAT UR-MCNC: 138 MG/DL
HBA1C MFR BLD: 7.9 % (ref 0–5.6)
HDLC SERPL-MCNC: 43 MG/DL
LDLC SERPL CALC-MCNC: 110 MG/DL
MICROALBUMIN UR-MCNC: 10 MG/L
MICROALBUMIN/CREAT UR: 6.94 MG/G CR (ref 0–25)
NONHDLC SERPL-MCNC: 140 MG/DL
TRIGL SERPL-MCNC: 152 MG/DL
TSH SERPL DL<=0.005 MIU/L-ACNC: 3.23 MU/L (ref 0.4–4)

## 2019-07-23 PROCEDURE — 99395 PREV VISIT EST AGE 18-39: CPT | Performed by: NURSE PRACTITIONER

## 2019-07-23 PROCEDURE — 83036 HEMOGLOBIN GLYCOSYLATED A1C: CPT | Performed by: NURSE PRACTITIONER

## 2019-07-23 PROCEDURE — G0145 SCR C/V CYTO,THINLAYER,RESCR: HCPCS | Performed by: NURSE PRACTITIONER

## 2019-07-23 PROCEDURE — 80061 LIPID PANEL: CPT | Performed by: NURSE PRACTITIONER

## 2019-07-23 PROCEDURE — 87624 HPV HI-RISK TYP POOLED RSLT: CPT | Performed by: NURSE PRACTITIONER

## 2019-07-23 PROCEDURE — 82043 UR ALBUMIN QUANTITATIVE: CPT | Performed by: NURSE PRACTITIONER

## 2019-07-23 PROCEDURE — 36415 COLL VENOUS BLD VENIPUNCTURE: CPT | Performed by: NURSE PRACTITIONER

## 2019-07-23 PROCEDURE — 99213 OFFICE O/P EST LOW 20 MIN: CPT | Mod: 25 | Performed by: NURSE PRACTITIONER

## 2019-07-23 PROCEDURE — 84443 ASSAY THYROID STIM HORMONE: CPT | Performed by: NURSE PRACTITIONER

## 2019-07-23 RX ORDER — METFORMIN HCL 500 MG
500 TABLET, EXTENDED RELEASE 24 HR ORAL 2 TIMES DAILY WITH MEALS
Qty: 60 TABLET | Refills: 2 | COMMUNITY
Start: 2019-07-23 | End: 2019-08-28

## 2019-07-23 ASSESSMENT — ENCOUNTER SYMPTOMS
PALPITATIONS: 0
DYSURIA: 0
CHILLS: 0
FREQUENCY: 0
SHORTNESS OF BREATH: 0
COUGH: 0
EYE PAIN: 0
HEADACHES: 0
DIARRHEA: 0
MYALGIAS: 0
FEVER: 0
JOINT SWELLING: 0
ABDOMINAL PAIN: 0
ARTHRALGIAS: 0
WEAKNESS: 0
CONSTIPATION: 0
HEARTBURN: 0
HEMATURIA: 0
SORE THROAT: 0
BREAST MASS: 0
PARESTHESIAS: 0
NERVOUS/ANXIOUS: 0
NAUSEA: 0
HEMATOCHEZIA: 0
DIZZINESS: 0

## 2019-07-23 ASSESSMENT — MIFFLIN-ST. JEOR: SCORE: 1780.49

## 2019-07-23 NOTE — PROGRESS NOTES
SUBJECTIVE:   CC: Leigh Foreman is an 28 year old woman who presents for preventive health visit.     Healthy Habits:     Getting at least 3 servings of Calcium per day:  Yes    Bi-annual eye exam:  Yes    Dental care twice a year:  Yes    Sleep apnea or symptoms of sleep apnea:  None    Diet:  Diabetic and Carbohydrate counting    Frequency of exercise:  2-3 days/week    Duration of exercise:  45-60 minutes    Taking medications regularly:  Yes    Medication side effects:  None    PHQ-2 Total Score: 0    Additional concerns today:  No        Today's PHQ-2 Score:   PHQ-2 ( 1999 [employer]) 7/23/2019   Q1: Little interest or pleasure in doing things 0   Q2: Feeling down, depressed or hopeless 0   PHQ-2 Score 0   Q1: Little interest or pleasure in doing things Not at all   Q2: Feeling down, depressed or hopeless Not at all   PHQ-2 Score 0       Abuse: Current or Past(Physical, Sexual or Emotional)- No  Do you feel safe in your environment? Yes    Social History     Tobacco Use     Smoking status: Never Smoker     Smokeless tobacco: Never Used   Substance Use Topics     Alcohol use: No     If you drink alcohol do you typically have >3 drinks per day or >7 drinks per week? No    Alcohol Use 7/23/2019   Prescreen: >3 drinks/day or >7 drinks/week? No       Reviewed orders with patient.  Reviewed health maintenance and updated orders accordingly - Yes  BP Readings from Last 3 Encounters:   07/23/19 126/80   06/10/19 132/80   06/09/19 125/81    Wt Readings from Last 3 Encounters:   07/23/19 107 kg (236 lb)   06/21/19 107 kg (236 lb)   06/10/19 107.5 kg (237 lb)                  Patient Active Problem List   Diagnosis     left ovarian serous cystadenoma.     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     Diabetes mellitus, type 2 (H)     Morbid obesity (H)     GBS (group B streptococcus) infection     Past Surgical History:   Procedure  Laterality Date     CYSTECTOMY PILONIDAL  5/17/2012    Procedure:CYSTECTOMY PILONIDAL; Pilonidal cystectomy; Surgeon:PHONG STALEY; Location:[facility]     INCISION AND DRAINAGE, ABSCESS, SIMPLE      pilonidal abscess     SURGICAL HISTORY OF -   07/2006    rt ovary removed       Social History     Tobacco Use     Smoking status: Never Smoker     Smokeless tobacco: Never Used   Substance Use Topics     Alcohol use: No     Family History   Problem Relation Age of Onset     Diabetes Father      Hypertension Father      Cardiovascular Maternal Grandfather         MI 42     Diabetes Paternal Grandmother      Hypertension Paternal Grandmother          Current Outpatient Medications   Medication Sig Dispense Refill     blood glucose (ACCU-CHEK GUIDE) test strip Use to test blood sugar 1 times daily or as directed. 100 each 11     blood glucose monitoring (ACCU-CHEK FASTCLIX) lancets Use to test blood sugar 1 times daily or as directed. 102 each 11     metFORMIN (GLUCOPHAGE-XR) 500 MG 24 hr tablet TAKE ONE TABLET BY MOUTH ONCE DAILY WITH DINNER 60 tablet 2     Allergies   Allergen Reactions     Nkda [No Known Drug Allergies]      Recent Labs   Lab Test 06/09/19  0826 06/06/19  1223 05/12/19  0833   A1C  --  9.3*  --    ALT 69*  --   --    CR 0.59  --  0.58   GFRESTIMATED >90  --  >90   GFRESTBLACK >90  --  >90   POTASSIUM 4.0  --  3.9        Mammogram Screening: Patient under age 50, mutual decision reflected in health maintenance.      Pertinent mammograms are reviewed under the imaging tab.  History of abnormal Pap smear: NO - age 21-29 PAP every 3 years recommended  PAP / HPV 1/15/2015   PAP ASC-US(A)     Reviewed and updated as needed this visit by clinical staff         Reviewed and updated as needed this visit by Provider        Past Medical History:   Diagnosis Date     ASCUS with positive high risk HPV 1/27/2015    1/15/2015:Pap--ASCUS, +HR HPV. Plan [place]-      H/O colposcopy with cervical biopsy 4/2015    atypia     "  Past Surgical History:   Procedure Laterality Date     CYSTECTOMY PILONIDAL  5/17/2012    Procedure:CYSTECTOMY PILONIDAL; Pilonidal cystectomy; Surgeon:PHONG STALEY; Location:[facility]     INCISION AND DRAINAGE, ABSCESS, SIMPLE      pilonidal abscess     SURGICAL HISTORY OF -   07/2006    rt ovary removed     OB History   No data available       Review of Systems   Constitutional: Negative for chills and fever.   HENT: Negative for congestion, ear pain, hearing loss and sore throat.    Eyes: Negative for pain and visual disturbance.   Respiratory: Negative for cough and shortness of breath.    Cardiovascular: Negative for chest pain, palpitations and peripheral edema.   Gastrointestinal: Negative for abdominal pain, constipation, diarrhea, heartburn, hematochezia and nausea.   Breasts:  Negative for tenderness, breast mass and discharge.   Genitourinary: Negative for dysuria, frequency, genital sores, hematuria, pelvic pain, urgency, vaginal bleeding and vaginal discharge.   Musculoskeletal: Negative for arthralgias, joint swelling and myalgias.   Skin: Negative for rash.   Neurological: Negative for dizziness, weakness, headaches and paresthesias.   Psychiatric/Behavioral: Negative for mood changes. The patient is not nervous/anxious.         OBJECTIVE:   /80 (BP Location: Right arm, Cuff Size: Adult Large)   Pulse 76   Temp 98.5  F (36.9  C) (Tympanic)   Resp 18   Ht 1.626 m (5' 4\")   Wt 107 kg (236 lb)   BMI 40.51 kg/m    Physical Exam  GENERAL: healthy, alert and no distress  EYES: Eyes grossly normal to inspection, PERRL and conjunctivae and sclerae normal  HENT: ear canals and TM's normal, nose and mouth without ulcers or lesions  NECK: no adenopathy, no asymmetry, masses, or scars and thyroid normal to palpation  RESP: lungs clear to auscultation - no rales, rhonchi or wheezes  BREAST: normal without masses, tenderness or nipple discharge and no palpable axillary masses or adenopathy  CV: regular " rate and rhythm, normal S1 S2, no S3 or S4, no murmur, click or rub, no peripheral edema and peripheral pulses strong  ABDOMEN: soft, nontender, no hepatosplenomegaly, no masses and bowel sounds normal   (female): normal female external genitalia, normal urethral meatus, vaginal mucosa pink, moist, well rugated, and normal cervix/adnexa/uterus without masses or discharge  MS: no gross musculoskeletal defects noted, no edema  SKIN: no suspicious lesions or rashes  NEURO: Normal strength and tone, mentation intact and speech normal  PSYCH: mentation appears normal, affect normal/bright    Results for orders placed or performed in visit on 07/23/19   Pap imaged thin layer screen with HPV - recommended age 30 - 65 years (select HPV order below)   Result Value Ref Range    PAP NIL     Copath Report         Patient Name: CATHI GARCIA  MR#: [id number]  Specimen #: [id number]  Collected: 7/23/2019  Received: 7/24/2019  Reported: 7/25/2019 11:33  Ordering Phy(s): JF ALEXANDER    For improved result formatting, select 'View Enhanced Report Format' under   Linked Documents section.    SPECIMEN/STAIN PROCESS:  Pap imaged thin layer prep screening (Surepath, FocalPoint with guided   screening)       Pap-Cyto x 1, HPV ordered x 1    SOURCE: Cervical, endocervical  ----------------------------------------------------------------   Pap imaged thin layer prep screening (Surepath, FocalPoint with guided   screening)  SPECIMEN ADEQUACY:  Satisfactory for evaluation.  -Transformation zone component present.    CYTOLOGIC INTERPRETATION:    Negative for intraepithelial lesion or malignancy    Electronically signed out by:  BIANKA Rodarte (ASCP)    CLINICAL HISTORY:    Previous ASC-US  Date of Last Pap: 01/15/2015,    Papanicolaou Test Limitations:  Cervical cytology is a screening test w ith   limited sensitivity; regular  screening is critical for cancer prevention; Pap tests are primarily   effective for the  diagnosis/prevention of  squamous cell carcinoma, not adenocarcinomas or other cancers.    COLLECTION SITE:  Client:  [facility]  Location: [facility] ()    The technical component of this testing was completed at the [facility], with the professional component performed   at the [facility], [address] [place] [address],   [place] [address] ([phone])       Lipid panel reflex to direct LDL Fasting   Result Value Ref Range    Cholesterol 183 <200 mg/dL    Triglycerides 152 (H) <150 mg/dL    HDL Cholesterol 43 (L) >49 mg/dL    LDL Cholesterol Calculated 110 (H) <100 mg/dL    Non HDL Cholesterol 140 (H) <130 mg/dL   TSH with free T4 reflex   Result Value Ref Range    TSH 3.23 0.40 - 4.00 mU/L   Albumin Random Urine Quantitative with Creat Ratio   Result Value Ref Range    Creatinine Urine 138 mg/dL    Albumin Urine mg/L 10 mg/L    Albumin Urine mg/g Cr 6.94 0 - 25 mg/g Cr   Hemoglobin A1c   Result Value Ref Range    Hemoglobin A1C 7.9 (H) 0 - 5.6 %       ASSESSMENT/PLAN:   (Z00.00) Routine general medical examination at a health care facility  (primary encounter diagnosis)  Comment:   Plan: Pap imaged thin layer screen with HPV -         recommended age 30 - 65 years (select HPV order        below), HPV High Risk Types DNA Cervical, Lipid        panel reflex to direct LDL Fasting, TSH with         free T4 reflex      (E11.9) Diabetes mellitus, new onset (H)  Comment: Patient has new onset diabetes hemoglobin A1c was 7.9.  Patient started on metformin will have follow-up with diabetic educator.  Plan: Albumin Random Urine Quantitative with Creat         Ratio, Hemoglobin A1c, metFORMIN         (GLUCOPHAGE-XR) 500 MG 24 hr tablet        COUNSELING:  Reviewed preventive health counseling, as reflected in patient instructions       Regular exercise       Healthy diet/nutrition       Vision  "screening       Contraception       Family planning       Safe sex practices/STD prevention    Estimated body mass index is 40.51 kg/m  as calculated from the following:    Height as of 6/10/19: 1.626 m (5' 4\").    Weight as of 6/21/19: 107 kg (236 lb).    Weight management plan: Discussed healthy diet and exercise guidelines     reports that she has never smoked. She has never used smokeless tobacco.      Counseling Resources:  ATP IV Guidelines  Pooled Cohorts Equation Calculator  Breast Cancer Risk Calculator  FRAX Risk Assessment  ICSI Preventive Guidelines  Dietary Guidelines for Americans, 2010  USDA's MyPlate  ASA Prophylaxis  Lung CA Screening    PARAMJIT Martell CNP  [facility]  "

## 2019-07-23 NOTE — NURSING NOTE
"Chief Complaint   Patient presents with     Physical       Initial /80 (BP Location: Right arm, Cuff Size: Adult Large)   Pulse 76   Temp 98.5  F (36.9  C) (Tympanic)   Resp 18   Ht 1.626 m (5' 4\")   Wt 107 kg (236 lb)   BMI 40.51 kg/m   Estimated body mass index is 40.51 kg/m  as calculated from the following:    Height as of this encounter: 1.626 m (5' 4\").    Weight as of this encounter: 107 kg (236 lb).    Patient presents to the clinic using No DME    Health Maintenance that is potentially due pending provider review:  Pap Smear    Will do pap and labs today. Eye exam was done 11/2018- sent to abstracting.    Is there anyone who you would like to be able to receive your results? No  If yes have patient fill out HALIE      "

## 2019-07-24 NOTE — RESULT ENCOUNTER NOTE
Please Notify Leigh  of test results microalbumin is within normal limits TSH is within normal limits.  Cholesterol numbers are mildly elevated with your triglycerides at 152 we like to see those below 150 your good cholesterol is at 143 relates that above 49.  Based on this low cardiac risk factors would not recommend medication intervention but would recommend continued low-cholesterol diet and exercise and recheck in 1 year.    Anette Fung CNP

## 2019-07-25 LAB
COPATH REPORT: NORMAL
PAP: NORMAL

## 2019-07-29 LAB
FINAL DIAGNOSIS: ABNORMAL
HPV HR 12 DNA CVX QL NAA+PROBE: POSITIVE
HPV16 DNA SPEC QL NAA+PROBE: NEGATIVE
HPV18 DNA SPEC QL NAA+PROBE: NEGATIVE
SPECIMEN DESCRIPTION: ABNORMAL
SPECIMEN SOURCE CVX/VAG CYTO: ABNORMAL

## 2019-08-01 NOTE — PROGRESS NOTES
1/15/2015:Pap--ASCUS, +HR HPV. Plan [place]-  4/3/15: [place] Bx & ECC - atypia. Plan cotest in 1 year.   03/29/17 Would consider patient to be lost to follow-up.  7/23/19 NIL Pap, + HR HPV (Neg 16/18) @ 28 yrs old. Plan colp  7/29/19 Message left to return call. (brianna) Pt notified. (brianna)  10/1/19 [place] Bx - Negative, ECC - focal atypia. NIL Pap, + HR HPV (Neg 16/18). Plan cotest in 1 year. (MRA)  10/9/19 Provider notified pt with result and recommendation by phone. (MRA)       Pain - Patient was clinically upgraded due to pain.

## 2019-08-28 ENCOUNTER — ALLIED HEALTH/NURSE VISIT (OUTPATIENT)
Dept: EDUCATION SERVICES | Facility: CLINIC | Age: 29
End: 2019-08-28
Payer: COMMERCIAL

## 2019-08-28 VITALS — WEIGHT: 236.6 LBS | BODY MASS INDEX: 40.61 KG/M2

## 2019-08-28 DIAGNOSIS — E11.9 DIABETES MELLITUS, NEW ONSET (H): ICD-10-CM

## 2019-08-28 PROCEDURE — G0108 DIAB MANAGE TRN  PER INDIV: HCPCS | Performed by: DIETITIAN, REGISTERED

## 2019-08-28 RX ORDER — METFORMIN HCL 500 MG
500 TABLET, EXTENDED RELEASE 24 HR ORAL 2 TIMES DAILY WITH MEALS
Qty: 180 TABLET | Refills: 1 | Status: SHIPPED | OUTPATIENT
Start: 2019-08-28 | End: 2019-11-20

## 2019-08-28 NOTE — PROGRESS NOTES
"Diabetes Self-Management Education & Support    Diabetes Education Self Management & Training    SUBJECTIVE/OBJECTIVE:  Presents for: Individual review  Accompanied by: Self  Diabetes education in the past 24mo: (P) Yes  Focus of Visit: Healthy Eating, Healthy Coping, Taking Medication, Reducing Risks  Diabetes type: (P) Type 2  Disease course: (P) Improving  How confident are you filling out medical forms by yourself:: Not Assessed  Transportation concerns: No  Other concerns:: None  Cultural Influences/Ethnic Background:  American      Diabetes Symptoms & Complications  Blurred vision: (P) No  Fatigue: (P) No  Foot ulcerations: (P) No  Polydipsia: (P) No  Polyphagia: (P) No  Polyuria: (P) Yes  Visual change: (P) No  Weakness: (P) No  Weight loss: (P) No  Slow healing wounds: (P) No  Weight trend: (P) Stable  Autonomic neuropathy: (P) No  CVA: (P) No  Heart disease: (P) No  Nephropathy: (P) No  Peripheral neuropathy: (P) No  Peripheral Vascular Disease: (P) No  Retinopathy: (P) No  Sexual dysfunction: (P) No    Patient Problem List and Family Medical History reviewed for relevant medical history, current medical status, and diabetes risk factors.    Vitals:  Wt 107.3 kg (236 lb 9.6 oz)   BMI 40.61 kg/m    Estimated body mass index is 40.61 kg/m  as calculated from the following:    Height as of 7/23/19: 1.626 m (5' 4\").    Weight as of this encounter: 107.3 kg (236 lb 9.6 oz).   Last 3 BP:   BP Readings from Last 3 Encounters:   07/23/19 126/80   06/10/19 132/80   06/09/19 125/81       History   Smoking Status     Never Smoker   Smokeless Tobacco     Never Used       Labs:  Lab Results   Component Value Date    A1C 7.9 07/23/2019     Lab Results   Component Value Date     06/09/2019     Lab Results   Component Value Date     07/23/2019     HDL Cholesterol   Date Value Ref Range Status   07/23/2019 43 (L) >49 mg/dL Final   ]  GFR Estimate   Date Value Ref Range Status   06/09/2019 >90 >60 " mL/min/[1.73_m2] Final     Comment:     Non  GFR Calc  Starting 12/18/2018, serum creatinine based estimated GFR (eGFR) will be   calculated using the Chronic Kidney Disease Epidemiology Collaboration   (CKD-EPI) equation.       GFR Estimate If Black   Date Value Ref Range Status   06/09/2019 >90 >60 mL/min/[1.73_m2] Final     Comment:      GFR Calc  Starting 12/18/2018, serum creatinine based estimated GFR (eGFR) will be   calculated using the Chronic Kidney Disease Epidemiology Collaboration   (CKD-EPI) equation.       Lab Results   Component Value Date    CR 0.59 06/09/2019     No results found for: MICROALBUMIN    Healthy Eating  Healthy Eating Assessed Today: Yes  Cultural/Yazdanism diet restrictions?: (P) No  Meal planning: (P) Carbohydrate counting, Smaller portions  Meals include: (P) Breakfast, Lunch, Dinner, Snacks  Breakfast: fruit grain bars or rice cakes, oats and honey bar  Lunch: pbj sandwich or ham and cheese sandwich or cauliflower rice bowls  Dinner: chicken with broccoli or corn dog, small amount of fries or tacos, one soft shell  Snacks: string cheese or oat adn honey bar or cheesits or peanuts  Beverages: (P) Water, Milk, Diet soda  Has patient met with a dietitian in the past?: (P) No    Being Active  Being Active Assessed Today: Yes  Exercise:: Yes  Days per week of moderate to strenuous exercise (like a brisk walk): 6  On average, minutes per day of exercise at this level: 50  How intense was your typical exercise? : (P) Heavy (like jogging or swimming  Exercise Minutes per Week: 300  Barrier to exercise: (P) Time    Monitoring  Monitoring Assessed Today: Yes  Did patient bring glucose meter to appointment? : No  Blood Glucose Meter: (P) Accu-check  Home Glucose (Sugar) Monitoring: (P) 1-2 times per day  Blood glucose trend: (P) Fluctuating minimally  Low Glucose Range (mg/dL): (P) 110-130  High Glucose Range (mg/dL): (P) 140-180  Overall Range (mg/dL): (P)  110-130    125-126, one day at 160 (anniversary)    Taking Medications  Diabetes Medication(s)     Biguanides       metFORMIN (GLUCOPHAGE-XR) 500 MG 24 hr tablet    1 tablet (500 mg) 2 times daily (with meals)          Current Treatments: (P) Diet, Oral Agent (monotherapy)    Problem Solving  Hypoglycemia Frequency: (P) Never  Hypoglycemia Treatment: (P) Juice  Patient carries a carbohydrate source: (P) No  Medical alert: (P) No  Severe weather/disaster plan for diabetes management?: (P) No  DKA prevention plan?: (P) No  Sick day plan for diabetes management?: (P) No              Reducing Risks  CAD Risks: (P) Diabetes Mellitus, Obesity  Has dilated eye exam at least once a year?: (P) Yes  Sees dentist every 6 months?: (P) Yes  Sees podiatrist (foot doctor)?: (P) No    Healthy Coping  Informal Support system:: (P) Children, Tatiana based, Family, Friends, Parent, Spouse  Difficulty affording diabetes management supplies?: (P) No  Patient Activation Measure Survey Score:  ESTUARDO Score (Last Two) 1/15/2015   ESTUARDO Raw Score 36   Activation Score 47.4   ESTUARDO Level 2       ASSESSMENT:  Doing well, numbers are in range for the most part.  Frustrated over no wt loss since she is working on 6 times a week and watching meal plan.  She will continue to work on things for better wt loss.  IF no wt loss in three months may look into a GLP1 to assist with that.          Patient's most recent   Lab Results   Component Value Date    A1C 7.9 07/23/2019    is not meeting goal of <7.0    INTERVENTION:   Diabetes knowledge and skills assessment:     Patient is knowledgeable in diabetes management concepts related to: Healthy Eating, Being Active and Monitoring    Patient needs further education on the following diabetes management concepts: Healthy Eating, Being Active, Monitoring, Taking Medication, Problem Solving, Reducing Risks and Healthy Coping    Based on learning assessment above, most appropriate setting for further diabetes  education would be: Individual setting.    Education provided today on:  AADE Self-Care Behaviors:  Healthy Eating: carbohydrate counting and consistency in amount, composition, and timing of food intake  Being Active: relationship to blood glucose and describe appropriate activity program    Opportunities for ongoing education and support in diabetes-self management were discussed.    Pt verbalized understanding of concepts discussed and recommendations provided today.       Education Materials Provided:  No new materials provided today    PLAN:  See Patient Instructions for co-developed, patient-stated behavior change goals.  AVS printed and provided to patient today. See Follow-Up section for recommended follow-up.      Time Spent: 30 minutes  Encounter Type: Individual    Any diabetes medication dose changes were made via the CDE Protocol and Collaborative Practice Agreement with the patient's primary care provider. A copy of this encounter was shared with the provider.

## 2019-10-01 ENCOUNTER — OFFICE VISIT (OUTPATIENT)
Dept: FAMILY MEDICINE | Facility: CLINIC | Age: 29
End: 2019-10-01
Payer: COMMERCIAL

## 2019-10-01 VITALS
WEIGHT: 237.4 LBS | DIASTOLIC BLOOD PRESSURE: 72 MMHG | HEIGHT: 64 IN | BODY MASS INDEX: 40.53 KG/M2 | TEMPERATURE: 98.4 F | RESPIRATION RATE: 15 BRPM | SYSTOLIC BLOOD PRESSURE: 116 MMHG | HEART RATE: 64 BPM

## 2019-10-01 DIAGNOSIS — R87.610 ASCUS WITH POSITIVE HIGH RISK HPV CERVICAL: Primary | ICD-10-CM

## 2019-10-01 DIAGNOSIS — R87.810 ASCUS WITH POSITIVE HIGH RISK HPV CERVICAL: Primary | ICD-10-CM

## 2019-10-01 PROCEDURE — 88305 TISSUE EXAM BY PATHOLOGIST: CPT | Performed by: FAMILY MEDICINE

## 2019-10-01 PROCEDURE — 87624 HPV HI-RISK TYP POOLED RSLT: CPT | Performed by: FAMILY MEDICINE

## 2019-10-01 PROCEDURE — 57455 BIOPSY OF CERVIX W/SCOPE: CPT | Performed by: FAMILY MEDICINE

## 2019-10-01 PROCEDURE — 88175 CYTOPATH C/V AUTO FLUID REDO: CPT | Performed by: FAMILY MEDICINE

## 2019-10-01 ASSESSMENT — MIFFLIN-ST. JEOR: SCORE: 1786.84

## 2019-10-01 NOTE — NURSING NOTE
"Chief Complaint   Patient presents with     Colposcopy       Initial Ht 1.626 m (5' 4\")   Wt 107.7 kg (237 lb 6.4 oz)   LMP 09/13/2019   Breastfeeding? No   BMI 40.75 kg/m   Estimated body mass index is 40.75 kg/m  as calculated from the following:    Height as of this encounter: 1.626 m (5' 4\").    Weight as of this encounter: 107.7 kg (237 lb 6.4 oz).    Patient presents to the clinic using No DME    Health Maintenance that is potentially due pending provider review:  NONE    n/a    Is there anyone who you would like to be able to receive your results? No  If yes have patient fill out HALIE    "

## 2019-10-01 NOTE — PATIENT INSTRUCTIONS
I will call you with pathology results in the next one week     Bleeding minimally would be normal

## 2019-10-01 NOTE — PROGRESS NOTES
"Subjective     Leigh Foreman is a 28 year old female who presents to clinic today for the following health issues:    HPI     Colposcopy    Patient Active Problem List                                                ASCUS with positive high risk HPV cervical 01/27/2015     Priority: Medium     1/15/2015:Pap--ASCUS, +HR HPV. Plan [place]-  4/3/15: [place] Bx & ECC - atypia. Plan cotest in 1 year.   03/29/17 Would consider patient to be lost to follow-up.  7/23/19 NIL Pap, + HR HPV (Neg 16/18) @ 28 yrs old. Plan colp                                                                     Reviewed and updated as needed this visit by Provider  Tobacco  Allergies  Meds  Problems  Med Hx  Surg Hx  Fam Hx         Review of Systems   ROS COMP: Constitutional, HEENT, cardiovascular, pulmonary, gi and gu systems are negative, except as otherwise noted.      Objective    /72 (BP Location: Right arm, Patient Position: Chair, Cuff Size: Adult Large)   Pulse 64   Temp 98.4  F (36.9  C) (Tympanic)   Resp 15   Ht 1.626 m (5' 4\")   Wt 107.7 kg (237 lb 6.4 oz)   LMP 09/13/2019   Breastfeeding? No   BMI 40.75 kg/m    Body mass index is 40.75 kg/m .  Physical Exam   Colposcopy/LEEP  Date/Time: 10/1/2019 9:20 AM  Performed by: India Yeh MD  Authorized by: India Yeh MD     Consent:     Consent obtained:  Written    Consent given by:  Patient    Procedural risks discussed:  Bleeding, possible continued pain, failure rate, infection and repeat procedure    Patient questions answered: yes      Patient agrees, verbalizes understanding, and wants to proceed: yes      Educational handouts given: yes      Instructions and paperwork completed: yes    Pre-procedure:     Pre-procedure timeout performed: yes      Premeds:  Ibuprofen    Prepped with: acetic acid    Indication:     Indication:  HPV    HPV Indications:  Other high risk  Procedure:     Procedure: Colposcopy w/ biopsy of cervix      Under " "satisfactory analgesia the patient was prepped and draped in the dorsal lithotomy position: yes      Calera speculum was placed in the vagina: yes      Under colposcopic examination the transition zone was seen in entirety: yes      Intracervical block was performed: no      Cervical biopsy performed with a cervical biopsy punch: yes      Tampon inserted: no      Monsel's solution was applied: yes      Biopsy(s): yes      Location:  1 o'clock    Specimen to pathology: yes    Post-procedure:     Findings: White epithelium      Impression: Normal appearing cervix      Patient tolerance of procedure:  Patient tolerated the procedure well with no immediate complications            Diagnostic Test Results:  Labs reviewed in Epic        Assessment & Plan     (R87.610,  R87.810) ASCUS with positive high risk HPV cervical  (primary encounter diagnosis)  Comment:   Plan: colposcopy today           BMI:   Estimated body mass index is 40.75 kg/m  as calculated from the following:    Height as of this encounter: 1.626 m (5' 4\").    Weight as of this encounter: 107.7 kg (237 lb 6.4 oz).   Weight management plan: Discussed healthy diet and exercise guidelines        Patient Instructions   I will call you with pathology results in the next one week     Bleeding minimally would be normal       Return in about 1 week (around 10/8/2019).    India Yeh MD  [facility]      "

## 2019-10-03 LAB
COPATH REPORT: NORMAL
COPATH REPORT: NORMAL
PAP: NORMAL

## 2019-11-03 ENCOUNTER — HEALTH MAINTENANCE LETTER (OUTPATIENT)
Age: 29
End: 2019-11-03

## 2019-11-19 ENCOUNTER — ALLIED HEALTH/NURSE VISIT (OUTPATIENT)
Dept: EDUCATION SERVICES | Facility: CLINIC | Age: 29
End: 2019-11-19
Payer: COMMERCIAL

## 2019-11-19 VITALS — WEIGHT: 238.4 LBS | BODY MASS INDEX: 40.92 KG/M2

## 2019-11-19 DIAGNOSIS — E11.9 TYPE 2 DIABETES MELLITUS WITHOUT COMPLICATION, WITHOUT LONG-TERM CURRENT USE OF INSULIN (H): Primary | ICD-10-CM

## 2019-11-19 DIAGNOSIS — E11.9 TYPE 2 DIABETES MELLITUS WITHOUT COMPLICATION, WITHOUT LONG-TERM CURRENT USE OF INSULIN (H): ICD-10-CM

## 2019-11-19 LAB — HBA1C MFR BLD: 8.8 % (ref 0–5.6)

## 2019-11-19 PROCEDURE — 83036 HEMOGLOBIN GLYCOSYLATED A1C: CPT | Performed by: NURSE PRACTITIONER

## 2019-11-19 PROCEDURE — G0108 DIAB MANAGE TRN  PER INDIV: HCPCS | Performed by: DIETITIAN, REGISTERED

## 2019-11-19 PROCEDURE — 36415 COLL VENOUS BLD VENIPUNCTURE: CPT | Performed by: NURSE PRACTITIONER

## 2019-11-19 NOTE — PROGRESS NOTES
"Diabetes Self-Management Education & Support    Diabetes Education Self Management & Training    SUBJECTIVE/OBJECTIVE:  Presents for: Individual review  Accompanied by: Self  Diabetes education in the past 24mo: (P) Yes  Focus of Visit: Being Active, Problem Solving  Diabetes type: (P) Type 2  Disease course: (P) Stable  Transportation concerns: No  Other concerns:: None  Cultural Influences/Ethnic Background:  American      Diabetes Symptoms & Complications  Blurred vision: (P) No  Fatigue: (P) No  Foot ulcerations: (P) No  Polydipsia: (P) No  Polyphagia: (P) No  Polyuria: (P) No  Visual change: (P) No  Weakness: (P) No  Weight loss: (P) No  Slow healing wounds: (P) No  Weight trend: (P) Stable  Autonomic neuropathy: (P) No  CVA: (P) No  Heart disease: (P) No  Nephropathy: (P) No  Peripheral neuropathy: (P) No  Peripheral Vascular Disease: (P) No  Retinopathy: (P) No  Sexual dysfunction: (P) No    Patient Problem List and Family Medical History reviewed for relevant medical history, current medical status, and diabetes risk factors.    Vitals:  Wt 108.1 kg (238 lb 6.4 oz)   BMI 40.92 kg/m    Estimated body mass index is 40.92 kg/m  as calculated from the following:    Height as of 10/1/19: 1.626 m (5' 4\").    Weight as of this encounter: 108.1 kg (238 lb 6.4 oz).   Last 3 BP:   BP Readings from Last 3 Encounters:   10/01/19 116/72   07/23/19 126/80   06/10/19 132/80       History   Smoking Status     Never Smoker   Smokeless Tobacco     Never Used       Labs:  Lab Results   Component Value Date    A1C 7.9 07/23/2019     Lab Results   Component Value Date     06/09/2019     Lab Results   Component Value Date     07/23/2019     HDL Cholesterol   Date Value Ref Range Status   07/23/2019 43 (L) >49 mg/dL Final   ]  GFR Estimate   Date Value Ref Range Status   06/09/2019 >90 >60 mL/min/[1.73_m2] Final     Comment:     Non  GFR Calc  Starting 12/18/2018, serum creatinine based estimated GFR " (eGFR) will be   calculated using the Chronic Kidney Disease Epidemiology Collaboration   (CKD-EPI) equation.       GFR Estimate If Black   Date Value Ref Range Status   06/09/2019 >90 >60 mL/min/[1.73_m2] Final     Comment:      GFR Calc  Starting 12/18/2018, serum creatinine based estimated GFR (eGFR) will be   calculated using the Chronic Kidney Disease Epidemiology Collaboration   (CKD-EPI) equation.       Lab Results   Component Value Date    CR 0.59 06/09/2019     No results found for: MICROALBUMIN    Healthy Eating  Cultural/Tenriism diet restrictions?: (P) No  Meal planning: (P) Avoiding sweets, Carbohydrate counting, Smaller portions  Meals include: (P) Breakfast, Lunch, Dinner, Snacks  Breakfast: oatmeal pumpkin pecan (33 grams) or string cheese  Lunch: frozen life smart (zucchini noodles) or caulflower rice with beef or PBJ at her desk  Dinner: quentin steak, potatoes, broccoli or turkey burger with out bun, pickles and ketchup, green beans  Snacks: trail mix or fruit grain bars or bananas  Beverages: (P) Water  Has patient met with a dietitian in the past?: (P) No    Being Active  Exercise:: (has not been as active cant afford the gym)  How intense was your typical exercise? : (P) Moderate (like brisk walking)  Barrier to exercise: (P) Time    Monitoring  Blood Glucose Meter: (P) Accu-check  Home Glucose (Sugar) Monitoring: (P) 1-2 times per day  Blood glucose trend: (P) Fluctuating dramtically  Low Glucose Range (mg/dL): (P) 110-130  High Glucose Range (mg/dL): (P) 180-200  Overall Range (mg/dL): (P) 110-130    120-130, 180 once    Taking Medications  Diabetes Medication(s)     Biguanides       metFORMIN (GLUCOPHAGE-XR) 500 MG 24 hr tablet    Take 1 tablet (500 mg) by mouth 2 times daily (with meals)          Current Treatments: (P) Diet, Oral Agent (monotherapy)    Problem Solving  Hypoglycemia Frequency: (P) Rarely  Hypoglycemia Treatment: (P) Juice, Candy  Patient carries a  carbohydrate source: (P) Yes  Medical alert: (P) No  Severe weather/disaster plan for diabetes management?: (P) No  DKA prevention plan?: (P) No  Sick day plan for diabetes management?: (P) No    Hypoglycemia symptoms  Confusion: (P) No  Dizziness or Light-Headedness: (P) Yes  Headaches: (P) No  Hunger: (P) No  Mood changes: (P) No  Nervousness/Anxiety: (P) No  Sleepiness: (P) No  Speech difficulty: (P) No  Sweats: (P) No  Tremors: (P) No    Hypoglycemia Complications  Blackouts: (P) No  Hospitalization: (P) No  Nocturnal hypoglycemia: (P) No  Required assistance: (P) No  Required glucagon injection: (P) No  Seizures: (P) No    Reducing Risks  CAD Risks: (P) Obesity  Has dilated eye exam at least once a year?: (P) Yes  Sees dentist every 6 months?: (P) Yes  Sees podiatrist (foot doctor)?: (P) No    Healthy Coping  Informal Support system:: (P) Children, [place] based, Family, Friends, Parent, Spouse  Difficulty affording diabetes management supplies?: (P) No  Patient Activation Measure Survey Score:  ESTUARDO Score (Last Two) 1/15/2015   ESTUARDO Raw Score 36   Activation Score 47.4   ESTUARDO Level 2       ASSESSMENT:  Doing ok on meal plan.  She needs to find ways to get activity in cant afford gym membership anymore, discussed walking in the schools or DVD's.  She has high deductible with job so even visits are hard.         Patient's most recent   Lab Results   Component Value Date    A1C 7.9 07/23/2019    is meeting goal of <8.0    INTERVENTION:   Diabetes knowledge and skills assessment:     Patient is knowledgeable in diabetes management concepts related to: Healthy Eating, Being Active and Monitoring    Patient needs further education on the following diabetes management concepts: Healthy Eating, Being Active, Monitoring, Taking Medication, Problem Solving, Reducing Risks and Healthy Coping    Based on learning assessment above, most appropriate setting for further diabetes education would be: Individual  setting.    Education provided today on:  AADE Self-Care Behaviors:  Healthy Eating: consistency in amount, composition, and timing of food intake  Being Active: relationship to blood glucose and describe appropriate activity program    Opportunities for ongoing education and support in diabetes-self management were discussed.    Pt verbalized understanding of concepts discussed and recommendations provided today.       Education Materials Provided:  No new materials provided today    PLAN:  See Patient Instructions for co-developed, patient-stated behavior change goals.  AVS printed and provided to patient today. See Follow-Up section for recommended follow-up.      Time Spent: 30 minutes  Encounter Type: Individual    Any diabetes medication dose changes were made via the CDE Protocol and Collaborative Practice Agreement with the patient's primary care provider. A copy of this encounter was shared with the provider.

## 2019-11-19 NOTE — PATIENT INSTRUCTIONS
1.  Get an eye exam yearly    2.  Look into walking in schools after hours or work out video, walking in place video.    3.  Check out the diabetes study

## 2019-11-20 ENCOUNTER — TELEPHONE (OUTPATIENT)
Dept: FAMILY MEDICINE | Facility: CLINIC | Age: 29
End: 2019-11-20

## 2019-11-20 DIAGNOSIS — E11.9 TYPE 2 DIABETES MELLITUS WITHOUT COMPLICATION, WITHOUT LONG-TERM CURRENT USE OF INSULIN (H): Primary | ICD-10-CM

## 2019-11-20 RX ORDER — METFORMIN HCL 500 MG
1000 TABLET, EXTENDED RELEASE 24 HR ORAL 2 TIMES DAILY WITH MEALS
Qty: 240 TABLET | Refills: 3 | Status: SHIPPED | OUTPATIENT
Start: 2019-11-20 | End: 2019-12-31

## 2019-11-20 RX ORDER — METFORMIN HCL 500 MG
500 TABLET, EXTENDED RELEASE 24 HR ORAL
Qty: 240 TABLET | Refills: 3 | Status: SHIPPED | OUTPATIENT
Start: 2019-11-20 | End: 2019-11-20

## 2019-12-31 ENCOUNTER — MYC REFILL (OUTPATIENT)
Dept: FAMILY MEDICINE | Facility: CLINIC | Age: 29
End: 2019-12-31

## 2019-12-31 DIAGNOSIS — E11.9 TYPE 2 DIABETES MELLITUS WITHOUT COMPLICATION, WITHOUT LONG-TERM CURRENT USE OF INSULIN (H): ICD-10-CM

## 2019-12-31 RX ORDER — METFORMIN HCL 500 MG
1000 TABLET, EXTENDED RELEASE 24 HR ORAL 2 TIMES DAILY WITH MEALS
Qty: 360 TABLET | Refills: 0 | Status: SHIPPED | OUTPATIENT
Start: 2019-12-31 | End: 2020-06-08

## 2020-02-24 ENCOUNTER — TELEPHONE (OUTPATIENT)
Dept: FAMILY MEDICINE | Facility: CLINIC | Age: 30
End: 2020-02-24

## 2020-02-24 NOTE — TELEPHONE ENCOUNTER
Panel Management Review      Patient has the following on her problem list:     Diabetes    ASA: Failed    Last A1C  Lab Results   Component Value Date    A1C 8.8 11/19/2019    A1C 7.9 07/23/2019    A1C 9.3 06/06/2019     A1C tested: FAILED    Last LDL:    Lab Results   Component Value Date    CHOL 183 07/23/2019     Lab Results   Component Value Date    HDL 43 07/23/2019     Lab Results   Component Value Date     07/23/2019     Lab Results   Component Value Date    TRIG 152 07/23/2019     No results found for: CHOLHDLRATIO  Lab Results   Component Value Date    NHDL 140 07/23/2019       Is the patient on a Statin? NO             Is the patient on Aspirin? NO        Last three blood pressure readings:  BP Readings from Last 3 Encounters:   10/01/19 116/72   07/23/19 126/80   06/10/19 132/80       Date of last diabetes office visit: 7/23/19     Tobacco History:     History   Smoking Status     Never Smoker   Smokeless Tobacco     Never Used           Composite cancer screening  Chart review shows that this patient is due/due soon for the following Pap Smear  Summary:    Patient is due/failing the following:   A1C    Action needed:   Patient needs office visit for a diabetes check with labs.    Type of outreach:    Phone, left message for patient to call back.      Questions for provider review:    None                                                                                                                                    Ro Trivedi MA      Chart routed to Care Team .

## 2020-03-13 ENCOUNTER — OFFICE VISIT (OUTPATIENT)
Dept: FAMILY MEDICINE | Facility: CLINIC | Age: 30
End: 2020-03-13
Payer: COMMERCIAL

## 2020-03-13 ENCOUNTER — TRANSFERRED RECORDS (OUTPATIENT)
Dept: MULTI SPECIALTY CLINIC | Facility: CLINIC | Age: 30
End: 2020-03-13

## 2020-03-13 VITALS
DIASTOLIC BLOOD PRESSURE: 60 MMHG | BODY MASS INDEX: 40.97 KG/M2 | WEIGHT: 240 LBS | HEIGHT: 64 IN | RESPIRATION RATE: 18 BRPM | SYSTOLIC BLOOD PRESSURE: 110 MMHG | HEART RATE: 72 BPM | TEMPERATURE: 99.1 F

## 2020-03-13 DIAGNOSIS — D50.9 IRON DEFICIENCY ANEMIA, UNSPECIFIED IRON DEFICIENCY ANEMIA TYPE: ICD-10-CM

## 2020-03-13 DIAGNOSIS — E11.9 TYPE 2 DIABETES MELLITUS WITHOUT COMPLICATION, WITHOUT LONG-TERM CURRENT USE OF INSULIN (H): Primary | ICD-10-CM

## 2020-03-13 LAB
ALBUMIN SERPL-MCNC: 3.6 G/DL (ref 3.4–5)
ALP SERPL-CCNC: 73 U/L (ref 40–150)
ALT SERPL W P-5'-P-CCNC: 61 U/L (ref 0–50)
ANION GAP SERPL CALCULATED.3IONS-SCNC: 4 MMOL/L (ref 3–14)
AST SERPL W P-5'-P-CCNC: 36 U/L (ref 0–45)
BILIRUB SERPL-MCNC: 1 MG/DL (ref 0.2–1.3)
BUN SERPL-MCNC: 9 MG/DL (ref 7–30)
CALCIUM SERPL-MCNC: 8.6 MG/DL (ref 8.5–10.1)
CHLORIDE SERPL-SCNC: 106 MMOL/L (ref 94–109)
CO2 SERPL-SCNC: 27 MMOL/L (ref 20–32)
CREAT SERPL-MCNC: 0.62 MG/DL (ref 0.52–1.04)
ERYTHROCYTE [DISTWIDTH] IN BLOOD BY AUTOMATED COUNT: 14.5 % (ref 10–15)
FERRITIN SERPL-MCNC: 4 NG/ML (ref 12–150)
GFR SERPL CREATININE-BSD FRML MDRD: >90 ML/MIN/{1.73_M2}
GLUCOSE SERPL-MCNC: 123 MG/DL (ref 70–99)
HBA1C MFR BLD: 8.2 % (ref 0–5.6)
HCT VFR BLD AUTO: 31.2 % (ref 35–47)
HGB BLD-MCNC: 10 G/DL (ref 11.7–15.7)
IRON SATN MFR SERPL: 8 % (ref 15–46)
IRON SERPL-MCNC: 33 UG/DL (ref 35–180)
MCH RBC QN AUTO: 22.6 PG (ref 26.5–33)
MCHC RBC AUTO-ENTMCNC: 32.1 G/DL (ref 31.5–36.5)
MCV RBC AUTO: 70 FL (ref 78–100)
PLATELET # BLD AUTO: 317 10E9/L (ref 150–450)
POTASSIUM SERPL-SCNC: 4.1 MMOL/L (ref 3.4–5.3)
PROT SERPL-MCNC: 6.9 G/DL (ref 6.8–8.8)
RBC # BLD AUTO: 4.43 10E12/L (ref 3.8–5.2)
SODIUM SERPL-SCNC: 137 MMOL/L (ref 133–144)
TIBC SERPL-MCNC: 432 UG/DL (ref 240–430)
WBC # BLD AUTO: 7.5 10E9/L (ref 4–11)

## 2020-03-13 PROCEDURE — 99214 OFFICE O/P EST MOD 30 MIN: CPT | Performed by: NURSE PRACTITIONER

## 2020-03-13 PROCEDURE — 83540 ASSAY OF IRON: CPT | Performed by: NURSE PRACTITIONER

## 2020-03-13 PROCEDURE — 83550 IRON BINDING TEST: CPT | Performed by: NURSE PRACTITIONER

## 2020-03-13 PROCEDURE — 82728 ASSAY OF FERRITIN: CPT | Performed by: NURSE PRACTITIONER

## 2020-03-13 PROCEDURE — 85027 COMPLETE CBC AUTOMATED: CPT | Performed by: NURSE PRACTITIONER

## 2020-03-13 PROCEDURE — 80053 COMPREHEN METABOLIC PANEL: CPT | Performed by: NURSE PRACTITIONER

## 2020-03-13 PROCEDURE — 83036 HEMOGLOBIN GLYCOSYLATED A1C: CPT | Performed by: NURSE PRACTITIONER

## 2020-03-13 PROCEDURE — 36415 COLL VENOUS BLD VENIPUNCTURE: CPT | Performed by: NURSE PRACTITIONER

## 2020-03-13 ASSESSMENT — MIFFLIN-ST. JEOR: SCORE: 1793.63

## 2020-03-13 NOTE — PROGRESS NOTES
Subjective     Leigh Foreman is a 29 year old female who presents to clinic today for the following health issues:    HPI   Diabetes Follow-up    How often are you checking your blood sugar? One time daily  What time of day are you checking your blood sugars (select all that apply)?  Before and after meals  Have you had any blood sugars above 200?  No  Have you had any blood sugars below 70?  No    What symptoms do you notice when your blood sugar is low?  None    What concerns do you have today about your diabetes? None     Do you have any of these symptoms? (Select all that apply)  No numbness or tingling in feet.  No redness, sores or blisters on feet.  No complaints of excessive thirst.  No reports of blurry vision.  No significant changes to weight.    Have you had a diabetic eye exam in the last 12 months? Yes- Date of last eye exam: [facility],  Location: 3/2019    BP Readings from Last 2 Encounters:   10/01/19 116/72   07/23/19 126/80     Hemoglobin A1C (%)   Date Value   11/19/2019 8.8 (H)   07/23/2019 7.9 (H)     LDL Cholesterol Calculated (mg/dL)   Date Value   07/23/2019 110 (H)           How many servings of fruits and vegetables do you eat daily?  4 or more    On average, how many sweetened beverages do you drink each day (Examples: soda, juice, sweet tea, etc.  Do NOT count diet or artificially sweetened beverages)?   0    How many days per week do you exercise enough to make your heart beat faster? 3 or less    How many minutes a day do you exercise enough to make your heart beat faster? 9 or less    How many days per week do you miss taking your medication? 0      Patient Active Problem List   Diagnosis     left ovarian serous cystadenoma.     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 cervical     Diabetes mellitus, type 2 (H)     Morbid obesity (H)     GBS (group B streptococcus) infection     Past  Surgical History:   Procedure Laterality Date     CYSTECTOMY PILONIDAL  5/17/2012    Procedure:CYSTECTOMY PILONIDAL; Pilonidal cystectomy; Surgeon:PHONG STALEY; Location:[place] [facility]     INCISION AND DRAINAGE, ABSCESS, SIMPLE      pilonidal abscess     SURGICAL HISTORY OF -   07/2006    rt ovary removed       Social History     Tobacco Use     Smoking status: Never Smoker     Smokeless tobacco: Never Used   Substance Use Topics     Alcohol use: No     Family History   Problem Relation Age of Onset     Diabetes Father      Hypertension Father      Cardiovascular Maternal Grandfather         MI 42     Diabetes Paternal Grandmother      Hypertension Paternal Grandmother          Current Outpatient Medications   Medication Sig Dispense Refill     blood glucose (ACCU-CHEK GUIDE) test strip Use to test blood sugar 1 times daily or as directed. 100 each 11     blood glucose monitoring (ACCU-CHEK FASTCLIX) lancets Use to test blood sugar 1 times daily or as directed. 102 each 11     metFORMIN (GLUCOPHAGE-XR) 500 MG 24 hr tablet Take 2 tablets (1,000 mg) by mouth 2 times daily (with meals) 360 tablet 0     Allergies   Allergen Reactions     Nkda [No Known Drug Allergies]      Recent Labs   Lab Test 11/19/19  0756 07/23/19  0827 06/09/19  0826 06/06/19  1223 05/12/19  0833   A1C 8.8* 7.9*  --  9.3*  --    LDL  --  110*  --   --   --    HDL  --  43*  --   --   --    TRIG  --  152*  --   --   --    ALT  --   --  69*  --   --    CR  --   --  0.59  --  0.58   GFRESTIMATED  --   --  >90  --  >90   GFRESTBLACK  --   --  >90  --  >90   POTASSIUM  --   --  4.0  --  3.9   TSH  --  3.23  --   --   --       BP Readings from Last 3 Encounters:   03/13/20 110/60   10/01/19 116/72   07/23/19 126/80    Wt Readings from Last 3 Encounters:   03/13/20 108.9 kg (240 lb)   11/19/19 108.1 kg (238 lb 6.4 oz)   10/01/19 107.7 kg (237 lb 6.4 oz)                  Reviewed and updated as needed this visit by Provider         Review of Systems   ROS COMP:  "Constitutional, HEENT, cardiovascular, pulmonary, GI, , musculoskeletal, neuro, skin, endocrine and psych systems are negative, except as otherwise noted.      Objective    /60 (BP Location: Right arm, Cuff Size: Adult Large)   Pulse 72   Temp 99.1  F (37.3  C) (Tympanic)   Resp 18   Ht 1.626 m (5' 4\")   Wt 108.9 kg (240 lb)   BMI 41.20 kg/m    Body mass index is 41.2 kg/m .  Physical Exam   GENERAL: healthy, alert and no distress  EYES: Eyes grossly normal to inspection, PERRL and conjunctivae and sclerae normal  HENT: ear canals and TM's normal, nose and mouth without ulcers or lesions  NECK: no adenopathy, no asymmetry, masses, or scars and thyroid normal to palpation  RESP: lungs clear to auscultation - no rales, rhonchi or wheezes  CV: regular rate and rhythm, normal S1 S2, no S3 or S4, no murmur, click or rub, no peripheral edema and peripheral pulses strong  ABDOMEN: soft, nontender, no hepatosplenomegaly, no masses and bowel sounds normal  MS: no gross musculoskeletal defects noted, no edema  SKIN: no suspicious lesions or rashes  NEURO: Normal strength and tone, mentation intact and speech normal  PSYCH: mentation appears normal, affect normal/bright          Results for orders placed or performed in visit on 03/13/20   Eye Exam - HIM Scan     Status: None   Result Value Ref Range    RETINOPATHY UNKNOWN     Narrative    Date of last eye exam: [facility],  Location: 3/2019   Results for orders placed or performed in visit on 03/13/20   Hemoglobin A1c     Status: Abnormal   Result Value Ref Range    Hemoglobin A1C 8.2 (H) 0 - 5.6 %   Comprehensive metabolic panel     Status: Abnormal   Result Value Ref Range    Sodium 137 133 - 144 mmol/L    Potassium 4.1 3.4 - 5.3 mmol/L    Chloride 106 94 - 109 mmol/L    Carbon Dioxide 27 20 - 32 mmol/L    Anion Gap 4 3 - 14 mmol/L    Glucose 123 (H) 70 - 99 mg/dL    Urea Nitrogen 9 7 - 30 mg/dL    Creatinine 0.62 0.52 - 1.04 mg/dL    GFR " Estimate >90 >60 mL/min/[1.73_m2]    GFR Estimate If Black >90 >60 mL/min/[1.73_m2]    Calcium 8.6 8.5 - 10.1 mg/dL    Bilirubin Total 1.0 0.2 - 1.3 mg/dL    Albumin 3.6 3.4 - 5.0 g/dL    Protein Total 6.9 6.8 - 8.8 g/dL    Alkaline Phosphatase 73 40 - 150 U/L    ALT 61 (H) 0 - 50 U/L    AST 36 0 - 45 U/L   CBC with platelets     Status: Abnormal   Result Value Ref Range    WBC 7.5 4.0 - 11.0 10e9/L    RBC Count 4.43 3.8 - 5.2 10e12/L    Hemoglobin 10.0 (L) 11.7 - 15.7 g/dL    Hematocrit 31.2 (L) 35.0 - 47.0 %    MCV 70 (L) 78 - 100 fl    MCH 22.6 (L) 26.5 - 33.0 pg    MCHC 32.1 31.5 - 36.5 g/dL    RDW 14.5 10.0 - 15.0 %    Platelet Count 317 150 - 450 10e9/L   Iron and iron binding capacity     Status: Abnormal   Result Value Ref Range    Iron 33 (L) 35 - 180 ug/dL    Iron Binding Cap 432 (H) 240 - 430 ug/dL    Iron Saturation Index 8 (L) 15 - 46 %   Ferritin     Status: Abnormal   Result Value Ref Range    Ferritin 4 (L) 12 - 150 ng/mL     Assessment & Plan     (E11.9) Type 2 diabetes mellitus without complication, without long-term current use of insulin (H)  (primary encounter diagnosis)  Comment: Uncontrolled will add glipizide  Plan: Hemoglobin A1c, Comprehensive metabolic panel,         CBC with platelets, glipiZIDE (GLUCOTROL XL) 5         MG 24 hr tablet      (D50.9) Iron deficiency anemia, unspecified iron deficiency anemia type  Comment: New diagnosis of iron deficiency anemia will start on iron supplements and recheck hemoglobin in 3 months  Plan: Iron and iron binding capacity, Ferritin,         ferrous gluconate (FERGON) 324 (38 Fe) MG         tablet       See Patient Instructions    No follow-ups on file.    Anette Fung, PARAMJIT [facility]

## 2020-03-13 NOTE — PATIENT INSTRUCTIONS
Patient Education     Diet: Diabetes  Food is an important tool that you can use to control diabetes and stay healthy. Eating well-balanced meals in the correct amounts will help you control your blood glucose levels and prevent low blood sugar reactions. It will also help you reduce the health risks of diabetes. There is no one specific diet that is right for everyone with diabetes. But there are general guidelines to follow. A registered dietitian (RD) will create a tailored diet approach that s just right for you. He or she will also help you plan healthy meals and snacks. If you have any questions, call your dietitian for advice.     Guidelines for success  Talk with your healthcare provider before starting a diabetes diet or weight loss program. If you haven't talked with a dietitian yet, ask your provider for a referral. The following guidelines can help you succeed:    Select foods from the 6 food groups below. Your dietitian will help you find food choices within each group. He or she will also show you serving sizes and how many servings you can have at each meal.  ? Grains, beans, and starchy vegetables  ? Vegetables  ? Fruit  ? Milk or yogurt  ? Meat, poultry, fish, or tofu  ? Healthy fats    Check your blood sugar levels as directed by your provider. Take any medicine as prescribed by your provider.    Learn to read food labels and pick the right portion sizes.    Eat only the amount of food in your meal plan. Eat about the same amount of food at regular times each day. Don t skip meals. Eat meals 4 to 5 hours apart, with snacks in between.    Limit alcohol. It raises blood sugar levels. Drink water or calorie-free diet drinks that use safe sweeteners.    Eat less fat to help lower your risk of heart disease. Use nonfat or low-fat dairy products and lean meats. Avoid fried foods. Use cooking oils that are unsaturated, such as olive, canola, or peanut oil.    Talk with your dietitian about safe sugar  substitutes.    Avoid added salt. It can contribute to high blood pressure, which can cause heart disease. People with diabetes already have a risk of high blood pressure and heart disease.    Stay at a healthy weight. If you need to lose weight, cut down on your portion sizes. But don t skip meals. Exercise is an important part of any weight management program. Talk with your provider about an exercise program that s right for you.    For more information about the best diet plan for you, talk with a registered dietitian (RD). To find an RD in your area, contact:  ? Academy of Nutrition and Dietetics www.eatright.org  ? The American Diabetes Association [phone] www.diabetes.org  Date Last Reviewed: 8/1/2016 2000-2019 [employer] [address] [place] [address], [place] [address]. All rights reserved. This information is not intended as a substitute for professional medical care. Always follow your healthcare professional's instructions.

## 2020-03-17 RX ORDER — GLIPIZIDE 5 MG/1
5 TABLET, FILM COATED, EXTENDED RELEASE ORAL DAILY
Qty: 90 TABLET | Refills: 1 | Status: SHIPPED | OUTPATIENT
Start: 2020-03-17 | End: 2021-07-19

## 2020-03-17 RX ORDER — FERROUS GLUCONATE 324(38)MG
324 TABLET ORAL
Qty: 90 TABLET | Refills: 1 | Status: SHIPPED | OUTPATIENT
Start: 2020-03-17 | End: 2021-07-13

## 2020-06-02 ENCOUNTER — ANCILLARY PROCEDURE (OUTPATIENT)
Dept: GENERAL RADIOLOGY | Facility: CLINIC | Age: 30
End: 2020-06-02
Attending: FAMILY MEDICINE
Payer: COMMERCIAL

## 2020-06-02 ENCOUNTER — OFFICE VISIT (OUTPATIENT)
Dept: FAMILY MEDICINE | Facility: CLINIC | Age: 30
End: 2020-06-02
Payer: COMMERCIAL

## 2020-06-02 VITALS
BODY MASS INDEX: 41.32 KG/M2 | RESPIRATION RATE: 18 BRPM | HEIGHT: 64 IN | HEART RATE: 64 BPM | WEIGHT: 242 LBS | TEMPERATURE: 99.4 F | SYSTOLIC BLOOD PRESSURE: 130 MMHG | DIASTOLIC BLOOD PRESSURE: 74 MMHG

## 2020-06-02 DIAGNOSIS — S69.91XA INJURY OF RIGHT WRIST, INITIAL ENCOUNTER: ICD-10-CM

## 2020-06-02 DIAGNOSIS — S69.91XA INJURY OF RIGHT WRIST, INITIAL ENCOUNTER: Primary | ICD-10-CM

## 2020-06-02 PROCEDURE — 99213 OFFICE O/P EST LOW 20 MIN: CPT | Performed by: FAMILY MEDICINE

## 2020-06-02 PROCEDURE — 73110 X-RAY EXAM OF WRIST: CPT | Mod: RT

## 2020-06-02 ASSESSMENT — MIFFLIN-ST. JEOR: SCORE: 1802.7

## 2020-06-02 NOTE — PROGRESS NOTES
SUBJECTIVE   Leigh Foreman is a 29 year old female who presents with     Musculoskeletal problem/pain      Duration: Friday    Description  Location: Right wrist     Intensity:  moderate    Accompanying signs and symptoms: radiation of pain to wrist and warmth. Getting harder to move at times     History  Previous similar problem: no   Previous evaluation:  none    Precipitating or alleviating factors:  Trauma or overuse: YES- dog jumped on her and her wrist slammed up against the wall   Aggravating factors include: lifting, exercise and overuse    Therapies tried and outcome: rest/inactivity, ice, heat         PCP   PARAMJIT Martell [facility] [phone]    Health Maintenance        Health Maintenance Due   Topic Date Due     DIABETIC FOOT EXAM  1989     HIV SCREENING  11/21/2004     PNEUMOCOCCAL IMMUNIZATION 19-64 MEDIUM RISK (1 of 1 - PPSV23) 11/21/2008     DTAP/TDAP/TD IMMUNIZATION (6 - Td) 08/19/2012     PHQ-2  01/01/2020     EYE EXAM  03/01/2020     A1C  06/13/2020     PAP FOLLOW-UP  10/01/2020     HPV FOLLOW-UP  10/01/2020       HPI        Patient Active Problem List   Diagnosis     left ovarian serous cystadenoma.     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 cervical     Diabetes mellitus, type 2 (H)     Morbid obesity (H)     GBS (group B streptococcus) infection     Current Outpatient Medications   Medication     blood glucose (ACCU-CHEK GUIDE) test strip     blood glucose monitoring (ACCU-CHEK FASTCLIX) lancets     ferrous gluconate (FERGON) 324 (38 Fe) MG tablet     glipiZIDE (GLUCOTROL XL) 5 MG 24 hr tablet     metFORMIN (GLUCOPHAGE-XR) 500 MG 24 hr tablet     No current facility-administered medications for this visit.        Patient Active Problem List   Diagnosis     left ovarian serous cystadenoma.     ACNE     Pilonidal abscess     CARDIOVASCULAR SCREENING; LDL GOAL LESS THAN 160     ASCUS with positive high  risk HPV cervical     Diabetes mellitus, type 2 (H)     Morbid obesity (H)     GBS (group B streptococcus) infection     Past Surgical History:   Procedure Laterality Date     CYSTECTOMY PILONIDAL  5/17/2012    Procedure:CYSTECTOMY PILONIDAL; Pilonidal cystectomy; Surgeon:PHONG STALEY; Location:[place] [facility]     INCISION AND DRAINAGE, ABSCESS, SIMPLE      pilonidal abscess     SURGICAL HISTORY OF -   07/2006    rt ovary removed       Social History     Tobacco Use     Smoking status: Never Smoker     Smokeless tobacco: Never Used   Substance Use Topics     Alcohol use: No     Family History   Problem Relation Age of Onset     Diabetes Father      Hypertension Father      Cardiovascular Maternal Grandfather         MI 42     Diabetes Paternal Grandmother      Hypertension Paternal Grandmother          Current Outpatient Medications   Medication Sig Dispense Refill     blood glucose (ACCU-CHEK GUIDE) test strip Use to test blood sugar 1 times daily or as directed. 100 each 11     blood glucose monitoring (ACCU-CHEK FASTCLIX) lancets Use to test blood sugar 1 times daily or as directed. 102 each 11     ferrous gluconate (FERGON) 324 (38 Fe) MG tablet Take 1 tablet (324 mg) by mouth daily (with breakfast) 90 tablet 1     glipiZIDE (GLUCOTROL XL) 5 MG 24 hr tablet Take 1 tablet (5 mg) by mouth daily 90 tablet 1     metFORMIN (GLUCOPHAGE-XR) 500 MG 24 hr tablet Take 2 tablets (1,000 mg) by mouth 2 times daily (with meals) 360 tablet 0     Allergies   Allergen Reactions     Nkda [No Known Drug Allergies]      Recent Labs   Lab Test 03/13/20  1211 11/19/19  0756 07/23/19  0827 06/09/19  0826   A1C 8.2* 8.8* 7.9*  --    LDL  --   --  110*  --    HDL  --   --  43*  --    TRIG  --   --  152*  --    ALT 61*  --   --  69*   CR 0.62  --   --  0.59   GFRESTIMATED >90  --   --  >90   GFRESTBLACK >90  --   --  >90   POTASSIUM 4.1  --   --  4.0   TSH  --   --  3.23  --       BP Readings from Last 3 Encounters:   06/02/20 130/74   03/13/20  "110/60   10/01/19 116/72    Wt Readings from Last 3 Encounters:   06/02/20 109.8 kg (242 lb)   03/13/20 108.9 kg (240 lb)   11/19/19 108.1 kg (238 lb 6.4 oz)                    Reviewed and updated:  Tobacco  Allergies  Meds  Med Hx  Surg Hx  Fam Hx  Soc Hx     ROS:  Constitutional, HEENT, cardiovascular, pulmonary, gi and gu systems are negative, except as otherwise noted.    PHYSICAL EXAM   /74 (Cuff Size: Adult Large)   Pulse 64   Temp 99.4  F (37.4  C) (Tympanic)   Resp 18   Ht 1.626 m (5' 4\")   Wt 109.8 kg (242 lb)   LMP 05/19/2020   Breastfeeding No   BMI 41.54 kg/m    Body mass index is 41.54 kg/m .  GENERAL: healthy, alert and no distress  NECK: no adenopathy, no asymmetry, masses, or scars and thyroid normal to palpation  RESP: lungs clear to auscultation - no rales, rhonchi or wheezes  CV: regular rates and rhythm, normal S1 S2, no S3 or S4 and no murmur, click or rub  MS: Right wrist ROM, no joint swelling or skin discoloration noted, radial pulses 3+, sensation to touch and pressure intact  NEURO: Normal strength and tone, mentation intact and speech normal    Assessment & Plan     (S69.91XA) Injury of right wrist, initial encounter  (primary encounter diagnosis)  Comment: Symptoms are likely secondary to right wrist sprain injury.  X-ray findings reviewed which came back unremarkable.  Suggested rest, icing, over-the-counter analgesia and wrist brace.  Follow-up if symptoms persist or worsen.  All question answered.  Plan: XR Wrist Right G/E 3 Views          Patient Instructions       Patient Education     Wrist Sprain  A sprain is an injury to the ligaments or capsule that holds a joint together. There are no broken bones. Most sprains take about 3 to 6 weeks to heal. If it a severe sprain where the ligament is completely torn, it can take months to recover.  Most wrist sprains are treated with a splint, wrist brace, or elastic wrap for support. Severe sprains may require " surgery.  Home care    Keep your arm elevated to reduce pain and swelling. This is very important during the first 48 hours.    Apply an ice pack over the injured area for 15 to 20 minutes every 3 to 6 hours. You should do this for the first 24 to 48 hours. You can make an ice pack by filling a plastic bag that seals at the top with ice cubes and then wrapping it with a thin towel. Continue to use ice packs for relief of pain and swelling as needed. As the ice melts, be careful to avoid getting your wrap, splint, or cast wet. After 48 hours, apply heat (warm shower or warm bath) for 15 to 20 minutes several times a day, or alternate ice and heat.     You may use over-the-counter pain medicine to control pain, unless another pain medicine was prescribed. If you have chronic liver or kidney disease or ever had a stomach ulcer or gastrointestinal bleeding, talk with your doctor before using these medicines.    If you were given a splint or brace, wear it for the time advised by your doctor.  Follow-up care  Follow up with your healthcare provider, or as advised. Any X-rays you had today don t show any broken bones, breaks, or fractures. Sometimes fractures don t show up on the first X-ray. Bruises and sprains can sometimes hurt as much as a fracture. These injuries can take time to heal completely. If your symptoms don t improve or they get worse, talk with your doctor. You may need a repeat X-ray. If X-rays were taken, you will be told of any new findings that may affect your care.  When to seek medical advice  Call your healthcare provider right away if any of these occur:    Pain or swelling increases    Fingers or hand becomes cold, blue, numb, or tingly   Date Last Reviewed: 5/1/2018 2000-2019 [employer]. [address] [place] [address], [place] [address]. All rights reserved. This information is not intended as a substitute for professional medical care. Always follow your healthcare professional's  instructions.               No follow-ups on file.    Romain Banegas MD  [facility]      Risks, benefits, side effects and rationale for treatment plan fully discussed with the patient and understanding expressed.

## 2020-06-02 NOTE — PATIENT INSTRUCTIONS
Patient Education     Wrist Sprain  A sprain is an injury to the ligaments or capsule that holds a joint together. There are no broken bones. Most sprains take about 3 to 6 weeks to heal. If it a severe sprain where the ligament is completely torn, it can take months to recover.  Most wrist sprains are treated with a splint, wrist brace, or elastic wrap for support. Severe sprains may require surgery.  Home care    Keep your arm elevated to reduce pain and swelling. This is very important during the first 48 hours.    Apply an ice pack over the injured area for 15 to 20 minutes every 3 to 6 hours. You should do this for the first 24 to 48 hours. You can make an ice pack by filling a plastic bag that seals at the top with ice cubes and then wrapping it with a thin towel. Continue to use ice packs for relief of pain and swelling as needed. As the ice melts, be careful to avoid getting your wrap, splint, or cast wet. After 48 hours, apply heat (warm shower or warm bath) for 15 to 20 minutes several times a day, or alternate ice and heat.     You may use over-the-counter pain medicine to control pain, unless another pain medicine was prescribed. If you have chronic liver or kidney disease or ever had a stomach ulcer or gastrointestinal bleeding, talk with your doctor before using these medicines.    If you were given a splint or brace, wear it for the time advised by your doctor.  Follow-up care  Follow up with your healthcare provider, or as advised. Any X-rays you had today don t show any broken bones, breaks, or fractures. Sometimes fractures don t show up on the first X-ray. Bruises and sprains can sometimes hurt as much as a fracture. These injuries can take time to heal completely. If your symptoms don t improve or they get worse, talk with your doctor. You may need a repeat X-ray. If X-rays were taken, you will be told of any new findings that may affect your care.  When to seek medical advice  Call your  healthcare provider right away if any of these occur:    Pain or swelling increases    Fingers or hand becomes cold, blue, numb, or tingly   Date Last Reviewed: 5/1/2018 2000-2019 [employer]. [address] [place] [address], [place] [address]. All rights reserved. This information is not intended as a substitute for professional medical care. Always follow your healthcare professional's instructions.

## 2020-06-02 NOTE — NURSING NOTE
"Chief Complaint   Patient presents with     Musculoskeletal Problem     /74 (Cuff Size: Adult Large)   Pulse 64   Temp 99.4  F (37.4  C) (Tympanic)   Resp 18   Ht 1.626 m (5' 4\")   Wt 109.8 kg (242 lb)   LMP 05/19/2020   Breastfeeding No   BMI 41.54 kg/m   Estimated body mass index is 41.54 kg/m  as calculated from the following:    Height as of this encounter: 1.626 m (5' 4\").    Weight as of this encounter: 109.8 kg (242 lb).  Patient presents to the clinic using No DME      Health Maintenance that is potentially due pending provider review:    Health Maintenance Due   Topic Date Due     DIABETIC FOOT EXAM  1989     HIV SCREENING  11/21/2004     PNEUMOCOCCAL IMMUNIZATION 19-64 MEDIUM RISK (1 of 1 - PPSV23) 11/21/2008     DTAP/TDAP/TD IMMUNIZATION (6 - Td) 08/19/2012     PHQ-2  01/01/2020     EYE EXAM  03/01/2020     A1C  06/13/2020     PAP FOLLOW-UP  10/01/2020     HPV FOLLOW-UP  10/01/2020                "

## 2020-06-08 DIAGNOSIS — E11.9 TYPE 2 DIABETES MELLITUS WITHOUT COMPLICATION, WITHOUT LONG-TERM CURRENT USE OF INSULIN (H): ICD-10-CM

## 2020-06-08 RX ORDER — METFORMIN HCL 500 MG
TABLET, EXTENDED RELEASE 24 HR ORAL
Qty: 180 TABLET | Refills: 0 | Status: SHIPPED | OUTPATIENT
Start: 2020-06-08 | End: 2020-10-26

## 2020-09-14 ENCOUNTER — TELEPHONE (OUTPATIENT)
Dept: FAMILY MEDICINE | Facility: CLINIC | Age: 30
End: 2020-09-14

## 2020-09-14 NOTE — TELEPHONE ENCOUNTER
Panel Management Review      Patient has the following on her problem list:     Diabetes    ASA: Failed    Last A1C  Lab Results   Component Value Date    A1C 8.2 03/13/2020    A1C 8.8 11/19/2019    A1C 7.9 07/23/2019    A1C 9.3 06/06/2019     A1C tested: FAILED    Last LDL:    Lab Results   Component Value Date    CHOL 183 07/23/2019     Lab Results   Component Value Date    HDL 43 07/23/2019     Lab Results   Component Value Date     07/23/2019     Lab Results   Component Value Date    TRIG 152 07/23/2019     No results found for: CHOLHDLRATIO  Lab Results   Component Value Date    NHDL 140 07/23/2019       Is the patient on a Statin? NO             Is the patient on Aspirin? NO        Last three blood pressure readings:  BP Readings from Last 3 Encounters:   06/02/20 130/74   03/13/20 110/60   10/01/19 116/72       Date of last diabetes office visit: 3/13/20     Tobacco History:     History   Smoking Status     Never Smoker   Smokeless Tobacco     Never Used           Composite cancer screening  Chart review shows that this patient is due/due soon for the following Pap Smear  Summary:    Patient is due/failing the following:   microalbumin, A1C and LDL    Action needed:   Patient needs office visit for a diabetes check and labs.    Type of outreach:    Phone, left message for patient to call back.     Questions for provider review:    None                                                                                                                                    Ro Trivedi MA      Chart routed to Care Team .

## 2020-09-18 ENCOUNTER — OFFICE VISIT (OUTPATIENT)
Dept: FAMILY MEDICINE | Facility: CLINIC | Age: 30
End: 2020-09-18
Payer: COMMERCIAL

## 2020-09-18 VITALS
DIASTOLIC BLOOD PRESSURE: 80 MMHG | WEIGHT: 240 LBS | BODY MASS INDEX: 40.97 KG/M2 | HEART RATE: 70 BPM | RESPIRATION RATE: 18 BRPM | TEMPERATURE: 99.3 F | SYSTOLIC BLOOD PRESSURE: 130 MMHG | HEIGHT: 64 IN

## 2020-09-18 DIAGNOSIS — N92.6 MISSED PERIOD: ICD-10-CM

## 2020-09-18 DIAGNOSIS — L05.91 INFECTED PILONIDAL CYST: Primary | ICD-10-CM

## 2020-09-18 LAB — HCG UR QL: NEGATIVE

## 2020-09-18 PROCEDURE — 81025 URINE PREGNANCY TEST: CPT | Performed by: FAMILY MEDICINE

## 2020-09-18 PROCEDURE — 99214 OFFICE O/P EST MOD 30 MIN: CPT | Performed by: FAMILY MEDICINE

## 2020-09-18 RX ORDER — METRONIDAZOLE 500 MG/1
500 TABLET ORAL 3 TIMES DAILY
Qty: 21 TABLET | Refills: 0 | Status: SHIPPED | OUTPATIENT
Start: 2020-09-18 | End: 2020-09-24

## 2020-09-18 RX ORDER — CEPHALEXIN 500 MG/1
500 CAPSULE ORAL 4 TIMES DAILY
Qty: 28 CAPSULE | Refills: 0 | Status: SHIPPED | OUTPATIENT
Start: 2020-09-18 | End: 2020-09-25

## 2020-09-18 ASSESSMENT — MIFFLIN-ST. JEOR: SCORE: 1793.63

## 2020-09-18 NOTE — PATIENT INSTRUCTIONS
Patient Education     Pilonidal Cyst    A pilonidal cyst is found near the base of the spine (tailbone) or top of the buttocks crease. It may look like a pit or small depression. In some cases, it may have a hollow tunnel (sinus tract) that connects it to the surface of the skin. Normally, a pilonidal cyst does not cause symptoms. But if it becomes infected, it can cause pain and swelling. This sheet tells you more about pilonidal cysts and how they are treated.  What causes a pilonidal cyst and who gets them?  Two main causes are:    Ingrown hairs. This happens when a hair is forced under the skin or when a hair follicle ruptures.    Injury to the area. This can happen from sitting for long periods of time.  These cysts are often diagnosed in people between ages 16 and 26. But people of any age can have a pilonidal cyst. They affect both men and women, but they are more common in men.  Symptoms of a pilonidal cyst infection  A pilonidal cyst may not cause symptoms unless it becomes infected or inflamed. Once a pilonidal cyst becomes infected, it is called a pilonidal abscess. Infection may cause the following symptoms:    Pain, redness, and swelling of the cyst and area around it    Foul-smelling drainage from the cyst    Fever  Diagnosing a pilonidal cyst  A pilonidal cyst can be diagnosed by how it looks and by its location. Your healthcare provider will examine the suspected cyst to confirm a diagnosis. You will be told if any tests are needed.  Treating a pilonidal cyst infection  Most pilonidal cysts are left alone. But if a cyst becomes infected or inflamed, treatment is needed. It may include the following:    Incision and drainage. If needed, the cyst is cut open, and pus and other infected material is allowed to drain.    Antibiotic medicines for the infection. Know that medicines do not make the cyst go away, and antibiotics have limited use in treating an abscess. They also won t keep a cyst from  becoming infected again.    Hot water soaks. These can help draw out the infection and ease pain and itching.    Surgery to remove the cyst (excision). This may be done if the infection is severe, does not respond to medicine, or keeps coming back. A surgeon cuts and removes the cyst and the tissue around it. Your healthcare provider can tell you more if this is needed.    Laser hair removalaround the area. This may decrease the frequency of flare-ups.  Preventing infection  A pilonidal cyst can easily become infected. Do the following to help prevent infections:    Keep the cyst and surrounding skin area clean.    Remove hair from the area of the cyst regularly. Ask your healthcare provider about safe hair removal products or procedures.    Avoid sitting in one position for long periods of time. This helps to reduce weight and pressure on your tailbone area. Sitting on a special cushion to relieve pressure on the tailbone may also help. Ask your healthcare provider about where to purchase these cushions.    Avoid tight-fitting clothing to reduce skin irritation around the cyst.  Date Last Reviewed: 2/1/2017 2000-2019 [employer]. [address] [place] [address], [place] [address]. All rights reserved. This information is not intended as a substitute for professional medical care. Always follow your healthcare professional's instructions.

## 2020-09-18 NOTE — PROGRESS NOTES
SUBJECTIVE   Leigh Foreman is a 29 year old female who presents with     Skin Issue   Onset/Duration: 3-4 days   Description  Location: Top of butt -   Intensity:  mild  Progression of Symptoms:  same  Accompanying signs and symptoms:   Bleeding: no  History:  Previous episodes of similar lesion: YES- had a pilonidal cyst in the past that kept coming back and had surgically removed   Precipitating or alleviating factors:   Therapies tried and outcome: Hot pad       PCP   PARAMJIT Martell [facility] [phone]    Health Maintenance        Health Maintenance Due   Topic Date Due     DIABETIC FOOT EXAM  1989     HIV SCREENING  11/21/2004     PNEUMOCOCCAL IMMUNIZATION 19-64 MEDIUM RISK (1 of 1 - PPSV23) 11/21/2008     DTAP/TDAP/TD IMMUNIZATION (6 - Td) 08/19/2012     EYE EXAM  03/01/2020     A1C  06/13/2020     PREVENTIVE CARE VISIT  07/23/2020     LIPID  07/23/2020     MICROALBUMIN  07/23/2020     INFLUENZA VACCINE (1) 09/01/2020     PAP FOLLOW-UP  10/01/2020     HPV FOLLOW-UP  10/01/2020       HPI        Patient Active Problem List   Diagnosis     left ovarian serous cystadenoma.     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 cervical     Diabetes mellitus, type 2 (H)     Morbid obesity (H)     GBS (group B streptococcus) infection     Current Outpatient Medications   Medication     blood glucose (ACCU-CHEK GUIDE) test strip     blood glucose monitoring (ACCU-CHEK FASTCLIX) lancets     ferrous gluconate (FERGON) 324 (38 Fe) MG tablet     glipiZIDE (GLUCOTROL XL) 5 MG 24 hr tablet     metFORMIN (GLUCOPHAGE-XR) 500 MG 24 hr tablet     No current facility-administered medications for this visit.        Patient Active Problem List   Diagnosis     left ovarian serous cystadenoma.     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 cervical     Diabetes mellitus, type  2 (H)     Morbid obesity (H)     GBS (group B streptococcus) infection     Past Surgical History:   Procedure Laterality Date     CYSTECTOMY PILONIDAL  5/17/2012    Procedure:CYSTECTOMY PILONIDAL; Pilonidal cystectomy; Surgeon:PHONG STALEY; Location:[facility] [facility]     INCISION AND DRAINAGE, ABSCESS, SIMPLE      pilonidal abscess     SURGICAL HISTORY OF -   07/2006    rt ovary removed       Social History     Tobacco Use     Smoking status: Never Smoker     Smokeless tobacco: Never Used   Substance Use Topics     Alcohol use: No     Family History   Problem Relation Age of Onset     Diabetes Father      Hypertension Father      Cardiovascular Maternal Grandfather         MI 42     Diabetes Paternal Grandmother      Hypertension Paternal Grandmother          Current Outpatient Medications   Medication Sig Dispense Refill     blood glucose (ACCU-CHEK GUIDE) test strip Use to test blood sugar 1 times daily or as directed. 100 each 11     blood glucose monitoring (ACCU-CHEK FASTCLIX) lancets Use to test blood sugar 1 times daily or as directed. 102 each 11     cephALEXin (KEFLEX) 500 MG capsule Take 1 capsule (500 mg) by mouth 4 times daily for 7 days 28 capsule 0     ferrous gluconate (FERGON) 324 (38 Fe) MG tablet Take 1 tablet (324 mg) by mouth daily (with breakfast) 90 tablet 1     glipiZIDE (GLUCOTROL XL) 5 MG 24 hr tablet Take 1 tablet (5 mg) by mouth daily 90 tablet 1     metFORMIN (GLUCOPHAGE-XR) 500 MG 24 hr tablet Take 1 tablet (500 mg) by mouth 2 times daily with meals 180 tablet 0     metroNIDAZOLE (FLAGYL) 500 MG tablet Take 1 tablet (500 mg) by mouth 3 times daily for 7 days 21 tablet 0     Allergies   Allergen Reactions     Nkda [No Known Drug Allergies]      Recent Labs   Lab Test 03/13/20  1211 11/19/19  0756 07/23/19  0827 06/09/19  0826   A1C 8.2* 8.8* 7.9*  --    LDL  --   --  110*  --    HDL  --   --  43*  --    TRIG  --   --  152*  --    ALT 61*  --   --  69*   CR 0.62  --   --  0.59   GFRESTIMATED >90  --   " --  >90   GFRESTBLACK >90  --   --  >90   POTASSIUM 4.1  --   --  4.0   TSH  --   --  3.23  --       BP Readings from Last 3 Encounters:   09/18/20 130/80   06/02/20 130/74   03/13/20 110/60    Wt Readings from Last 3 Encounters:   09/18/20 108.9 kg (240 lb)   06/02/20 109.8 kg (242 lb)   03/13/20 108.9 kg (240 lb)                    Reviewed and updated:  Tobacco  Allergies  Meds  Med Hx  Surg Hx  Fam Hx  Soc Hx     ROS:   ROS: 10 point ROS neg other than the symptoms noted above in the HPI.    PHYSICAL EXAM   /80 (Cuff Size: Adult Large)   Pulse 70   Temp 99.3  F (37.4  C) (Tympanic)   Resp 18   Ht 1.626 m (5' 4\")   Wt 108.9 kg (240 lb)   LMP 08/07/2020   Breastfeeding No   BMI 41.20 kg/m    Body mass index is 41.2 kg/m .  GENERAL: alert, no distress and obese  NECK: no adenopathy, no asymmetry, masses, or scars and thyroid normal to palpation  RESP: lungs clear to auscultation - no rales, rhonchi or wheezes  CV: regular rates and rhythm, normal S1 S2, no S3 or S4 and no murmur, click or rub  ABDOMEN: soft, nontender  MS: no gross musculoskeletal defects noted, no edema  SKIN: Intergluteal cleft: swollen, tender, slightly erythematous on palpation bilaterally, no significant fluctuance noted, chronic surgical scar  CNS: grossly intact     Results for orders placed or performed in visit on 09/18/20   HCG qualitative urine     Status: None   Result Value Ref Range    HCG Qual Urine Negative NEG^Negative       Assessment & Plan     Infected pilonidal cyst  --Suspect symptoms secondary to developing pilonidal cyst infection, history of pilonidal cyst excision about 8 years ago.  Treatment options discussed.  Cephalexin and metronidazole prescribed, common side effects discussed.  Suggested warm sitz bath, over-the-counter analgesia.  General surgery referral placed.  Instructed to go ER if symptoms worsen over the weekend.  Patient understood and in agreement with above plan.  All questions " answered.  - cephALEXin (KEFLEX) 500 MG capsule; Take 1 capsule (500 mg) by mouth 4 times daily for 7 days  - metroNIDAZOLE (FLAGYL) 500 MG tablet; Take 1 tablet (500 mg) by mouth 3 times daily for 7 days  - GENERAL SURG ADULT REFERRAL; Future      Missed period  --Trying to conceive, pregnancy test negative  - HCG qualitative urine           Patient Instructions     Patient Education     Pilonidal Cyst    A pilonidal cyst is found near the base of the spine (tailbone) or top of the buttocks crease. It may look like a pit or small depression. In some cases, it may have a hollow tunnel (sinus tract) that connects it to the surface of the skin. Normally, a pilonidal cyst does not cause symptoms. But if it becomes infected, it can cause pain and swelling. This sheet tells you more about pilonidal cysts and how they are treated.  What causes a pilonidal cyst and who gets them?  Two main causes are:    Ingrown hairs. This happens when a hair is forced under the skin or when a hair follicle ruptures.    Injury to the area. This can happen from sitting for long periods of time.  These cysts are often diagnosed in people between ages 16 and 26. But people of any age can have a pilonidal cyst. They affect both men and women, but they are more common in men.  Symptoms of a pilonidal cyst infection  A pilonidal cyst may not cause symptoms unless it becomes infected or inflamed. Once a pilonidal cyst becomes infected, it is called a pilonidal abscess. Infection may cause the following symptoms:    Pain, redness, and swelling of the cyst and area around it    Foul-smelling drainage from the cyst    Fever  Diagnosing a pilonidal cyst  A pilonidal cyst can be diagnosed by how it looks and by its location. Your healthcare provider will examine the suspected cyst to confirm a diagnosis. You will be told if any tests are needed.  Treating a pilonidal cyst infection  Most pilonidal cysts are left alone. But if a cyst becomes infected  or inflamed, treatment is needed. It may include the following:    Incision and drainage. If needed, the cyst is cut open, and pus and other infected material is allowed to drain.    Antibiotic medicines for the infection. Know that medicines do not make the cyst go away, and antibiotics have limited use in treating an abscess. They also won t keep a cyst from becoming infected again.    Hot water soaks. These can help draw out the infection and ease pain and itching.    Surgery to remove the cyst (excision). This may be done if the infection is severe, does not respond to medicine, or keeps coming back. A surgeon cuts and removes the cyst and the tissue around it. Your healthcare provider can tell you more if this is needed.    Laser hair removalaround the area. This may decrease the frequency of flare-ups.  Preventing infection  A pilonidal cyst can easily become infected. Do the following to help prevent infections:    Keep the cyst and surrounding skin area clean.    Remove hair from the area of the cyst regularly. Ask your healthcare provider about safe hair removal products or procedures.    Avoid sitting in one position for long periods of time. This helps to reduce weight and pressure on your tailbone area. Sitting on a special cushion to relieve pressure on the tailbone may also help. Ask your healthcare provider about where to purchase these cushions.    Avoid tight-fitting clothing to reduce skin irritation around the cyst.  Date Last Reviewed: 2/1/2017 2000-2019 [employer]. [address] [place], [place] [address]. All rights reserved. This information is not intended as a substitute for professional medical care. Always follow your healthcare professional's instructions.               No follow-ups on file.    Romain Banegas MD  [facility]      Risks, benefits, side effects and rationale for treatment plan fully discussed with the patient and understanding  expressed.

## 2020-09-24 ENCOUNTER — PATIENT OUTREACH (OUTPATIENT)
Dept: FAMILY MEDICINE | Facility: CLINIC | Age: 30
End: 2020-09-24

## 2020-09-24 ENCOUNTER — TELEPHONE (OUTPATIENT)
Dept: FAMILY MEDICINE | Facility: CLINIC | Age: 30
End: 2020-09-24

## 2020-09-24 DIAGNOSIS — L05.91 INFECTED PILONIDAL CYST: ICD-10-CM

## 2020-09-24 DIAGNOSIS — R87.810 ASCUS WITH POSITIVE HIGH RISK HPV CERVICAL: ICD-10-CM

## 2020-09-24 DIAGNOSIS — R87.610 ASCUS WITH POSITIVE HIGH RISK HPV CERVICAL: ICD-10-CM

## 2020-09-24 RX ORDER — CEPHALEXIN 500 MG/1
500 CAPSULE ORAL 4 TIMES DAILY
Qty: 12 CAPSULE | Refills: 0 | Status: SHIPPED | OUTPATIENT
Start: 2020-09-26 | End: 2020-09-29

## 2020-09-24 RX ORDER — METRONIDAZOLE 500 MG/1
500 TABLET ORAL 3 TIMES DAILY
Qty: 9 TABLET | Refills: 0 | Status: SHIPPED | OUTPATIENT
Start: 2020-09-26 | End: 2020-09-29

## 2020-09-24 NOTE — TELEPHONE ENCOUNTER
Will recommend to take antibiotics for 3 more days and complete 10-day course.  New prescriptions sent to patient's pharmacy.  Continue warm sitz bath and over-the-counter analgesia.  Follow-up with general surgery as scheduled.    Dr Banegas

## 2020-09-24 NOTE — TELEPHONE ENCOUNTER
Reason for call:  Patient reporting a symptom    Symptom or request: Pt was into see   Dr. Banegas 9/18/20 for Pilonidal cyst. Pt did not make appt 9/28/20. Pt feels they opened up. Pt has drainage, doing sitz bath and they continue to be uncomfortable. Please Advise.    Phone Number patient can be reached at:  Home number on file [phone] (home)    Best Time:  Any Time      Can we leave a detailed message on this number:  YES    Call taken on 9/24/2020 at 9:57 AM by Mesha Talavera

## 2020-09-24 NOTE — TELEPHONE ENCOUNTER
Has 2 days left of antibiotics. Does she need to do anything different now that it is draining? Has appointment with surgery on Monday

## 2020-09-28 ENCOUNTER — OFFICE VISIT (OUTPATIENT)
Dept: SURGERY | Facility: CLINIC | Age: 30
End: 2020-09-28
Attending: FAMILY MEDICINE
Payer: COMMERCIAL

## 2020-09-28 VITALS
SYSTOLIC BLOOD PRESSURE: 154 MMHG | HEART RATE: 93 BPM | HEIGHT: 64 IN | DIASTOLIC BLOOD PRESSURE: 72 MMHG | TEMPERATURE: 98.5 F | BODY MASS INDEX: 40.97 KG/M2 | WEIGHT: 240 LBS

## 2020-09-28 DIAGNOSIS — L05.91 INFECTED PILONIDAL CYST: ICD-10-CM

## 2020-09-28 PROCEDURE — 10080 I&D PILONIDAL CYST SIMPLE: CPT | Performed by: SURGERY

## 2020-09-28 ASSESSMENT — MIFFLIN-ST. JEOR: SCORE: 1793.63

## 2020-09-28 NOTE — NURSING NOTE
"Initial BP (!) 154/72 (BP Location: Right arm, Patient Position: Sitting, Cuff Size: Adult Regular)   Pulse 93   Temp 98.5  F (36.9  C) (Tympanic)   Ht 1.626 m (5' 4\")   Wt 108.9 kg (240 lb)   BMI 41.20 kg/m   Estimated body mass index is 41.2 kg/m  as calculated from the following:    Height as of this encounter: 1.626 m (5' 4\").    Weight as of this encounter: 108.9 kg (240 lb). .    Leigh Mata CMA    "

## 2020-09-28 NOTE — PATIENT INSTRUCTIONS
Per physician instructions      If you have questions or concerns on any instructions given to you by your provider today or if you need to schedule an appointment, you can reach us at [phone].

## 2020-09-28 NOTE — LETTER
9/28/2020         RE: Leigh Foreman  [address] [place] [address]        Dear Colleague,    Thank you for referring your patient, Leigh Foreman, to the [facility]. Please see a copy of my visit note below.    Leigh is a 29-year-old female who has a history of pilonidal abscess.  She states that about 8 years ago she underwent definitive surgical excision and eventually the wound healed.    Recently she has had a buildup of pus and 2 spots in the exact same area that have been draining.  One was on the left side and one was on the right side at the top of the  gluteal cleft.    On exam: There is a pointing abscess on the left side which sticks out about 1 cm above the surrounding scar tissue from her previous excision.  There is some drainage and induration, but not much cellulitis.    I offered her incision and drainage and she accepted.  The lesion was prepped with alcohol and then injected with about 10 cc of 1% Xylocaine with epinephrine.  I let 10 minutes called by for the anesthetic to take hold.  I then used a #15 blade and removed the top of the abscess with drainage of a moderate amount of pus.  I used a hemostat and opened up any loculated areas.  There was a fair amount of steady bruising, but no arterial bleeding.  The wound was packed tightly with half-inch iodoform gauze and covered with 4 x 4's and tape.  She was instructed to sit or lay on this is much as she can for the rest of the day.  I anticipate some bleeding through the outer bandage, but I advised her to leave the packing and and only change the 4 x 4's as necessary.    She is very well versed in taking care of open wounds, so I do not anticipate any issues.  I will leave it up to her as to whether she comes back to see him.  Hopefully this is a one-time occurrence and will not recur.  I will see her back as needed.  She is instructed to change the dressing twice daily.    eJro Morgan MD FACS    Again,  thank you for allowing me to participate in the care of your patient.        Sincerely,        Jero Morgan MD

## 2020-09-28 NOTE — PROGRESS NOTES
Leigh is a 29-year-old female who has a history of pilonidal abscess.  She states that about 8 years ago she underwent definitive surgical excision and eventually the wound healed.    Recently she has had a buildup of pus and 2 spots in the exact same area that have been draining.  One was on the left side and one was on the right side at the top of the  gluteal cleft.    On exam: There is a pointing abscess on the left side which sticks out about 1 cm above the surrounding scar tissue from her previous excision.  There is some drainage and induration, but not much cellulitis.    I offered her incision and drainage and she accepted.  The lesion was prepped with alcohol and then injected with about 10 cc of 1% Xylocaine with epinephrine.  I let 10 minutes called by for the anesthetic to take hold.  I then used a #15 blade and removed the top of the abscess with drainage of a moderate amount of pus.  I used a hemostat and opened up any loculated areas.  There was a fair amount of steady bruising, but no arterial bleeding.  The wound was packed tightly with half-inch iodoform gauze and covered with 4 x 4's and tape.  She was instructed to sit or lay on this is much as she can for the rest of the day.  I anticipate some bleeding through the outer bandage, but I advised her to leave the packing and and only change the 4 x 4's as necessary.    She is very well versed in taking care of open wounds, so I do not anticipate any issues.  I will leave it up to her as to whether she comes back to see him.  Hopefully this is a one-time occurrence and will not recur.  I will see her back as needed.  She is instructed to change the dressing twice daily.    Jero Morgan MD FACS

## 2020-10-26 ENCOUNTER — OFFICE VISIT (OUTPATIENT)
Dept: FAMILY MEDICINE | Facility: CLINIC | Age: 30
End: 2020-10-26
Payer: COMMERCIAL

## 2020-10-26 VITALS
HEART RATE: 68 BPM | HEIGHT: 64 IN | DIASTOLIC BLOOD PRESSURE: 62 MMHG | RESPIRATION RATE: 18 BRPM | BODY MASS INDEX: 41.15 KG/M2 | WEIGHT: 241 LBS | SYSTOLIC BLOOD PRESSURE: 122 MMHG | TEMPERATURE: 98.9 F

## 2020-10-26 DIAGNOSIS — E11.9 TYPE 2 DIABETES MELLITUS WITHOUT COMPLICATION, WITHOUT LONG-TERM CURRENT USE OF INSULIN (H): Primary | ICD-10-CM

## 2020-10-26 LAB
CHOLEST SERPL-MCNC: 151 MG/DL
CREAT UR-MCNC: 215 MG/DL
HBA1C MFR BLD: 9.3 % (ref 0–5.6)
HDLC SERPL-MCNC: 40 MG/DL
LDLC SERPL CALC-MCNC: 70 MG/DL
MICROALBUMIN UR-MCNC: 21 MG/L
MICROALBUMIN/CREAT UR: 9.63 MG/G CR (ref 0–25)
NONHDLC SERPL-MCNC: 111 MG/DL
TRIGL SERPL-MCNC: 206 MG/DL

## 2020-10-26 PROCEDURE — 82043 UR ALBUMIN QUANTITATIVE: CPT | Performed by: NURSE PRACTITIONER

## 2020-10-26 PROCEDURE — 80061 LIPID PANEL: CPT | Performed by: NURSE PRACTITIONER

## 2020-10-26 PROCEDURE — 83036 HEMOGLOBIN GLYCOSYLATED A1C: CPT | Performed by: NURSE PRACTITIONER

## 2020-10-26 PROCEDURE — 99214 OFFICE O/P EST MOD 30 MIN: CPT | Performed by: NURSE PRACTITIONER

## 2020-10-26 PROCEDURE — 36415 COLL VENOUS BLD VENIPUNCTURE: CPT | Performed by: NURSE PRACTITIONER

## 2020-10-26 RX ORDER — METFORMIN HCL 500 MG
1000 TABLET, EXTENDED RELEASE 24 HR ORAL 2 TIMES DAILY WITH MEALS
Qty: 360 TABLET | Refills: 1 | Status: SHIPPED | OUTPATIENT
Start: 2020-10-26 | End: 2021-08-03

## 2020-10-26 ASSESSMENT — MIFFLIN-ST. JEOR: SCORE: 1798.17

## 2020-10-26 NOTE — PROGRESS NOTES
"Subjective     Leigh Foreman is a 29 year old female who presents to clinic today for the following health issues:    HPI         Diabetes Follow-up    How often are you checking your blood sugar? One time daily  What time of day are you checking your blood sugars (select all that apply)?  Before meals  Have you had any blood sugars above 200?  Yes rarely  Have you had any blood sugars below 70?  No    What symptoms do you notice when your blood sugar is low?  None    What concerns do you have today about your diabetes? None     Do you have any of these symptoms? (Select all that apply)  No numbness or tingling in feet.  No redness, sores or blisters on feet.  No complaints of excessive thirst.  No reports of blurry vision.  No significant changes to weight.    Have you had a diabetic eye exam in the last 12 months? No    BP Readings from Last 2 Encounters:   10/26/20 122/62   09/28/20 (!) 154/72     Hemoglobin A1C (%)   Date Value   10/26/2020 9.3 (H)   03/13/2020 8.2 (H)     LDL Cholesterol Calculated (mg/dL)   Date Value   10/26/2020 70   07/23/2019 110 (H)           How many servings of fruits and vegetables do you eat daily?  4 or more    On average, how many sweetened beverages do you drink each day (Examples: soda, juice, sweet tea, etc.  Do NOT count diet or artificially sweetened beverages)?   0    How many days per week do you exercise enough to make your heart beat faster? 4    How many minutes a day do you exercise enough to make your heart beat faster? 30 - 60    How many days per week do you miss taking your medication? 0      Review of Systems   Constitutional, HEENT, cardiovascular, pulmonary, GI, , musculoskeletal, neuro, skin, endocrine and psych systems are negative, except as otherwise noted.      Objective    /62 (BP Location: Right arm, Cuff Size: Adult Large)   Pulse 68   Temp 98.9  F (37.2  C) (Tympanic)   Resp 18   Ht 1.626 m (5' 4\")   Wt 109.3 kg (241 lb)   BMI 41.37 " kg/m    Body mass index is 41.37 kg/m .  Physical Exam   GENERAL: healthy, alert and no distress  EYES: Eyes grossly normal to inspection, PERRL and conjunctivae and sclerae normal  HENT: ear canals and TM's normal, nose and mouth without ulcers or lesions  NECK: no adenopathy, no asymmetry, masses, or scars and thyroid normal to palpation  RESP: lungs clear to auscultation - no rales, rhonchi or wheezes  CV: regular rate and rhythm, normal S1 S2, no S3 or S4, no murmur, click or rub, no peripheral edema and peripheral pulses strong  ABDOMEN: soft, nontender, no hepatosplenomegaly, no masses and bowel sounds normal  MS: no gross musculoskeletal defects noted, no edema  SKIN: no suspicious lesions or rashes  NEURO: Normal strength and tone, mentation intact and speech normal  PSYCH: mentation appears normal, affect normal/bright    Results for orders placed or performed in visit on 10/26/20 (from the past 24 hour(s))   Albumin Random Urine Quantitative with Creat Ratio   Result Value Ref Range    Creatinine Urine 215 mg/dL    Albumin Urine mg/L 21 mg/L    Albumin Urine mg/g Cr 9.63 0 - 25 mg/g Cr   Hemoglobin A1c   Result Value Ref Range    Hemoglobin A1C 9.3 (H) 0 - 5.6 %   Lipid panel reflex to direct LDL Fasting   Result Value Ref Range    Cholesterol 151 <200 mg/dL    Triglycerides 206 (H) <150 mg/dL    HDL Cholesterol 40 (L) >49 mg/dL    LDL Cholesterol Calculated 70 <100 mg/dL    Non HDL Cholesterol 111 <130 mg/dL           Assessment & Plan     Type 2 diabetes mellitus without complication, without long-term current use of insulin (H)  Uncontrolled will increase patient's Metformin to 1000 mg twice a day Follow-up in 3 months for diabetic reevaluation and office visit  - Hemoglobin A1c  - Albumin Random Urine Quantitative with Creat Ratio  - Lipid panel reflex to direct LDL Fasting  - metFORMIN (GLUCOPHAGE-XR) 500 MG 24 hr tablet; Take 2 tablets (1,000 mg) by mouth 2 times daily (with meals)     BMI:  "  Estimated body mass index is 41.37 kg/m  as calculated from the following:    Height as of this encounter: 1.626 m (5' 4\").    Weight as of this encounter: 109.3 kg (241 lb).   Weight management plan: Discussed healthy diet and exercise guidelines         See Patient Instructions    Return in about 3 months (around 1/26/2021) for in person.    PARAMJIT Martell CNP  [facility]    "

## 2020-10-26 NOTE — PATIENT INSTRUCTIONS
Increase metformin to 1000 mg BID  Patient Education     Diet: Diabetes  Food is an important tool that you can use to control diabetes and stay healthy. Eating well-balanced meals in the correct amounts will help you control your blood glucose levels and prevent low blood sugar reactions. It will also help you reduce the health risks of diabetes. There is no one specific diet that is right for everyone with diabetes. But there are general guidelines to follow. A registered dietitian (JACKSON) will create a tailored diet approach that s just right for you. He or she will also help you plan healthy meals and snacks. If you have any questions, call your dietitian for advice.     Guidelines for success  Talk with your healthcare provider before starting a diabetes diet or weight loss program. If you haven't talked with a dietitian yet, ask your provider for a referral. The following guidelines can help you succeed:    Select foods from the 6 food groups below. Your dietitian will help you find food choices within each group. He or she will also show you serving sizes and how many servings you can have at each meal.  ? Grains, beans, and starchy vegetables  ? Vegetables  ? Fruit  ? Milk or yogurt  ? Meat, poultry, fish, or tofu  ? Healthy fats    Check your blood sugar levels as directed by your provider. Take any medicine as prescribed by your provider.    Learn to read food labels and pick the right portion sizes.    Eat only the amount of food in your meal plan. Eat about the same amount of food at regular times each day. Don t skip meals. Eat meals 4 to 5 hours apart, with snacks in between.    Limit alcohol. It raises blood sugar levels. Drink water or calorie-free diet drinks that use safe sweeteners.    Eat less fat to help lower your risk of heart disease. Use nonfat or low-fat dairy products and lean meats. Avoid fried foods. Use cooking oils that are unsaturated, such as olive, canola, or peanut oil.    Talk with  your dietitian about safe sugar substitutes.    Avoid added salt. It can contribute to high blood pressure, which can cause heart disease. People with diabetes already have a risk of high blood pressure and heart disease.    Stay at a healthy weight. If you need to lose weight, cut down on your portion sizes. But don t skip meals. Exercise is an important part of any weight management program. Talk with your provider about an exercise program that s right for you.    For more information about the best diet plan for you, talk with a registered dietitian (RD). To find an RD in your area, contact:  ? Academy of Nutrition and Dietetics www.eatright.org  ? The American Diabetes Association [phone] www.diabetes.org  Date Last Reviewed: 8/1/2016 2000-2019 [employer]. [address] [place] [address]. All rights reserved. This information is not intended as a substitute for professional medical care. Always follow your healthcare professional's instructions.           Patient Education     Understanding Type 2 Diabetes  When your body is working normally, the food you eat is digested and used as fuel. This fuel supplies energy to the body s cells. When you have diabetes, the fuel can t enter the cells. Without treatment, diabetes can cause serious long-term health problems.     Your body breaks down the food you eat into glucose.   How the body gets energy  The digestive system breaks down food, resulting in a sugar called glucose. Some of this glucose is stored in the liver. But most of it enters the bloodstream and travels to the cells to be used as fuel. Glucose needs the help of a hormone called insulin to enter the cells. Insulin is made in the pancreas. It is released into the bloodstream in response to the presence of glucose in the blood. Think of insulin as a key. When insulin reaches a cell, it attaches to the cell wall. This signals the cell to create an opening that allows  glucose to enter the cell.      When you have type 2 diabetes  Early in type 2 diabetes, your cells don t respond properly to insulin. Because of this, less glucose than normal moves into cells. This is called insulin resistance. In response, the pancreas makes more insulin. But eventually, the pancreas can t produce enough insulin to overcome insulin resistance. As less and less glucose enters cells, it builds up to a harmful level in the bloodstream. This is known as high blood sugar or hyperglycemia. The result is type 2 diabetes. The cells become starved for energy, which can leave you feeling tired and rundown.  Why high blood sugar is a problem  If high blood sugar is not controlled, blood vessels throughout the body become damaged. Prolonged high blood sugar affects organs, blood vessels, and nerves. As a result, the risks of damage to the heart, kidneys, eyes, and limbs increase. Diabetes also makes other problems, such as high blood pressure and high cholesterol, more dangerous. Over time, people with uncontrolled high blood sugar have an increase in risk of dying of, or being disabled by, heart attack, heart failure,  or stroke.  Date Last Reviewed: 7/1/2016 2000-2019 [employer]. [address] [place] [id number]. All rights reserved. This information is not intended as a substitute for professional medical care. Always follow your healthcare professional's instructions.

## 2020-11-16 ENCOUNTER — HEALTH MAINTENANCE LETTER (OUTPATIENT)
Age: 30
End: 2020-11-16

## 2021-02-07 ENCOUNTER — HEALTH MAINTENANCE LETTER (OUTPATIENT)
Age: 31
End: 2021-02-07

## 2021-02-22 ENCOUNTER — TELEPHONE (OUTPATIENT)
Dept: FAMILY MEDICINE | Facility: CLINIC | Age: 31
End: 2021-02-22

## 2021-02-22 NOTE — TELEPHONE ENCOUNTER
Patient Quality Outreach      Summary:    Patient has the following on her problem list/HM:   Diabetes    Last A1C:  Lab Results   Component Value Date    A1C 9.3 10/26/2020    A1C 8.2 03/13/2020       Last LDL:    Lab Results   Component Value Date    LDL 70 10/26/2020       Is the patient on a Statin? No          Is the patient on Aspirin? No        Last three blood pressure readings:  BP Readings from Last 3 Encounters:   10/26/20 122/62   09/28/20 (!) 154/72   09/18/20 130/80            Tobacco Use      Smoking status: Never Smoker      Smokeless tobacco: Never Used          Patient is due/failing the following:   A1C and Eye Exam    Type of outreach:    Phone, left message for patient/parent to call back.    Questions for provider review:    None                                                                                                                                     Ro Trivedi MA      Chart routed to Care Team.

## 2021-03-03 NOTE — TELEPHONE ENCOUNTER
Anette,    Patient is lost to pap tracking follow-up. Attempts to contact pt have been made per reminder process and there has been no reply and/or no appt scheduled.      Madison Garcia RN  Pap Tracking

## 2021-03-04 NOTE — TELEPHONE ENCOUNTER
Patient Quality Outreach 2nd Attempt      Summary:    Type of outreach:    Phone, left message for patient/parent to call back.    Next Steps:  Reach out within 90 days via Phone.    Max number of attempts reached: No. Will try again in 90 days if patient still on fail list.    Questions for provider review:    None                                                                                                                    Ro Trivedi MA      Chart routed to Care Team.

## 2021-03-08 ENCOUNTER — TELEPHONE (OUTPATIENT)
Dept: FAMILY MEDICINE | Facility: CLINIC | Age: 31
End: 2021-03-08

## 2021-03-08 NOTE — TELEPHONE ENCOUNTER
Patient Quality Outreach      Summary:    Patient has the following on her problem list/HM:   Diabetes    Last A1C:  Lab Results   Component Value Date    A1C 9.3 10/26/2020    A1C 8.2 03/13/2020       Last LDL:    Lab Results   Component Value Date    LDL 70 10/26/2020       Is the patient on a Statin? No          Is the patient on Aspirin? No        Last three blood pressure readings:  BP Readings from Last 3 Encounters:   10/26/20 122/62   09/28/20 (!) 154/72   09/18/20 130/80            Tobacco Use      Smoking status: Never Smoker      Smokeless tobacco: Never Used          Patient is due/failing the following:   A1C    Type of outreach:    Phone, left message for patient/parent to call back.    Questions for provider review:    None                                                                                                                                     Ro Trivedi MA      Chart routed to Care Team.

## 2021-03-08 NOTE — LETTER
[facility]  [address] [place] [id number]  [phone]      March 22, 2021    Leigh Foreman                                                                                                                     [address] [place] [address]            Dear Leigh Foreman,    At [facility] we care about your health and well-being. A review of your chart has indicated that you are due for a diabetes check with labs. Please contact us at [phone] to schedule your appointment.    If you have already had one or all of the above screening tests at another facility, please call us to update your chart.     You may contact the clinic at [phone] if you have any questions or concerns about this request.    Sincerely,    [facility] Care Staff/ ss

## 2021-03-13 ENCOUNTER — TRANSFERRED RECORDS (OUTPATIENT)
Dept: HEALTH INFORMATION MANAGEMENT | Facility: CLINIC | Age: 31
End: 2021-03-13

## 2021-03-13 LAB — RETINOPATHY: NORMAL

## 2021-03-15 NOTE — TELEPHONE ENCOUNTER
Called and left message for patient to call clinic back. She needs to schedule a diabetes check with labs.    Ro Trivedi MA

## 2021-03-22 NOTE — TELEPHONE ENCOUNTER
Called and left message for patient to call clinic back. She needs to schedule a diabetes check with labs.    Letter mailed.    Ro Trivedi MA

## 2021-03-30 ENCOUNTER — OFFICE VISIT (OUTPATIENT)
Dept: FAMILY MEDICINE | Facility: CLINIC | Age: 31
End: 2021-03-30
Payer: COMMERCIAL

## 2021-03-30 VITALS
DIASTOLIC BLOOD PRESSURE: 70 MMHG | WEIGHT: 246 LBS | HEIGHT: 64 IN | BODY MASS INDEX: 42 KG/M2 | RESPIRATION RATE: 18 BRPM | SYSTOLIC BLOOD PRESSURE: 124 MMHG | HEART RATE: 84 BPM | TEMPERATURE: 97.8 F

## 2021-03-30 DIAGNOSIS — E11.9 TYPE 2 DIABETES MELLITUS WITHOUT COMPLICATION, WITHOUT LONG-TERM CURRENT USE OF INSULIN (H): Primary | ICD-10-CM

## 2021-03-30 LAB
ANION GAP SERPL CALCULATED.3IONS-SCNC: 5 MMOL/L (ref 3–14)
BUN SERPL-MCNC: 11 MG/DL (ref 7–30)
CALCIUM SERPL-MCNC: 8.8 MG/DL (ref 8.5–10.1)
CHLORIDE SERPL-SCNC: 101 MMOL/L (ref 94–109)
CO2 SERPL-SCNC: 26 MMOL/L (ref 20–32)
CREAT SERPL-MCNC: 0.66 MG/DL (ref 0.52–1.04)
GFR SERPL CREATININE-BSD FRML MDRD: >90 ML/MIN/{1.73_M2}
GLUCOSE SERPL-MCNC: 215 MG/DL (ref 70–99)
HBA1C MFR BLD: 9.6 % (ref 0–5.6)
POTASSIUM SERPL-SCNC: 3.5 MMOL/L (ref 3.4–5.3)
SODIUM SERPL-SCNC: 132 MMOL/L (ref 133–144)

## 2021-03-30 PROCEDURE — 36415 COLL VENOUS BLD VENIPUNCTURE: CPT | Performed by: NURSE PRACTITIONER

## 2021-03-30 PROCEDURE — 83036 HEMOGLOBIN GLYCOSYLATED A1C: CPT | Performed by: NURSE PRACTITIONER

## 2021-03-30 PROCEDURE — 99214 OFFICE O/P EST MOD 30 MIN: CPT | Performed by: NURSE PRACTITIONER

## 2021-03-30 PROCEDURE — 80048 BASIC METABOLIC PNL TOTAL CA: CPT | Performed by: NURSE PRACTITIONER

## 2021-03-30 ASSESSMENT — MIFFLIN-ST. JEOR: SCORE: 1815.85

## 2021-03-30 NOTE — PROGRESS NOTES
"    Assessment & Plan     Type 2 diabetes mellitus without complication, without long-term current use of insulin (H)  Uncontrolled will start Glipizide recheck in 3 months continue to work on diet and exercise   - Hemoglobin A1c  - Basic metabolic panel       See Patient Instructions    No follow-ups on file.    PARAMJIT Martell CNP  [facility]    Bob Abraham is a 30 year old who presents for the following health issues     HPI     Diabetes Follow-up    How often are you checking your blood sugar? One time daily  What time of day are you checking your blood sugars (select all that apply)?  After meals  Have you had any blood sugars above 200?  No  Have you had any blood sugars below 70?  No    What symptoms do you notice when your blood sugar is low?  None    What concerns do you have today about your diabetes? None     Do you have any of these symptoms? (Select all that apply)  No numbness or tingling in feet.  No redness, sores or blisters on feet.  No complaints of excessive thirst.  No reports of blurry vision.  No significant changes to weight.    Have you had a diabetic eye exam in the last 12 months? Yes- Date of last eye exam: 3/13/21,  Location: [facility]    BP Readings from Last 2 Encounters:   10/26/20 122/62   09/28/20 (!) 154/72     Hemoglobin A1C (%)   Date Value   10/26/2020 9.3 (H)   03/13/2020 8.2 (H)     LDL Cholesterol Calculated (mg/dL)   Date Value   10/26/2020 70   07/23/2019 110 (H)         Review of Systems   Constitutional, HEENT, cardiovascular, pulmonary, gi and gu systems are negative, except as otherwise noted.      Objective    /70 (BP Location: Right arm, Cuff Size: Adult Large)   Pulse 84   Temp 97.8  F (36.6  C) (Tympanic)   Resp 18   Ht 1.626 m (5' 4\")   Wt 111.6 kg (246 lb)   BMI 42.23 kg/m    There is no height or weight on file to calculate BMI.  Physical Exam   GENERAL: healthy, alert and no distress  EYES: Eyes grossly " normal to inspection, PERRL and conjunctivae and sclerae normal  HENT: ear canals and TM's normal, nose and mouth without ulcers or lesions  NECK: no adenopathy, no asymmetry, masses, or scars and thyroid normal to palpation  RESP: lungs clear to auscultation - no rales, rhonchi or wheezes  CV: regular rate and rhythm, normal S1 S2, no S3 or S4, no murmur, click or rub, no peripheral edema and peripheral pulses strong  MS: no gross musculoskeletal defects noted, no edema  SKIN: no suspicious lesions or rashes  NEURO: Normal strength and tone, mentation intact and speech normal  PSYCH: mentation appears normal, affect normal/bright

## 2021-06-11 ENCOUNTER — TELEPHONE (OUTPATIENT)
Dept: FAMILY MEDICINE | Facility: CLINIC | Age: 31
End: 2021-06-11

## 2021-06-11 NOTE — TELEPHONE ENCOUNTER
Patient Quality Outreach      Summary:    Patient has the following on her problem list/HM:   Diabetes    Last A1C:  Lab Results   Component Value Date    A1C 9.6 03/30/2021    A1C 9.3 10/26/2020       Last LDL:    Lab Results   Component Value Date    LDL 70 10/26/2020       Is the patient on a Statin? No          Is the patient on Aspirin? No        Last three blood pressure readings:  BP Readings from Last 3 Encounters:   03/30/21 124/70   10/26/20 122/62   09/28/20 (!) 154/72            Tobacco Use      Smoking status: Never Smoker      Smokeless tobacco: Never Used          Patient is due/failing the following:   A1C    Type of outreach:    Phone, left message for patient/parent to call back.    Questions for provider review:    None                                                                                                                                     Ro Trivedi MA      Chart routed to Care Team.

## 2021-06-28 ENCOUNTER — OFFICE VISIT (OUTPATIENT)
Dept: FAMILY MEDICINE | Facility: CLINIC | Age: 31
End: 2021-06-28
Payer: COMMERCIAL

## 2021-06-28 VITALS
OXYGEN SATURATION: 100 % | DIASTOLIC BLOOD PRESSURE: 78 MMHG | HEART RATE: 97 BPM | SYSTOLIC BLOOD PRESSURE: 126 MMHG | TEMPERATURE: 99.5 F

## 2021-06-28 DIAGNOSIS — H65.01 RIGHT ACUTE SEROUS OTITIS MEDIA, RECURRENCE NOT SPECIFIED: Primary | ICD-10-CM

## 2021-06-28 PROCEDURE — 99213 OFFICE O/P EST LOW 20 MIN: CPT | Performed by: NURSE PRACTITIONER

## 2021-06-28 RX ORDER — FERROUS GLUCONATE 324(38)MG
324 TABLET ORAL
Qty: 90 TABLET | Refills: 1 | Status: CANCELLED | OUTPATIENT
Start: 2021-06-28

## 2021-06-28 NOTE — PROGRESS NOTES
Assessment & Plan     Right acute serous otitis media, recurrence not specified  No acute distress, exam unremarkable with exception of serous otitis media right.   With symptoms recommend a decongestant to improve symptoms.  Symptomatic care and follow up discussed.      Return in about 1 week (around 7/5/2021), or if symptoms worsen or fail to improve.    PARAMJIT Guevara CNP  M [facility] FREDY Abraham is a 30 year old who presents for the following health issues     HPI     Acute Illness  Acute illness concerns: right ear pain  Onset/Duration: 3 days   Symptoms:  Fever: no  Chills/Sweats: no  Headache (location?): YES - mild  Sinus Pressure: no  Conjunctivitis:  no  Ear Pain: YES: right > left   Rhinorrhea: no  Congestion: no  Sore Throat: YES- right side   Cough: no  Wheeze: no  Decreased Appetite: no  Nausea: no  Vomiting: no  Diarrhea: no  Dysuria/Freq.: no  Dysuria or Hematuria: no  Fatigue/Achiness: no  Sick/Strep Exposure: no  Therapies tried and outcome: ear drops OTC     Review of Systems   Constitutional, HEENT, cardiovascular, pulmonary, gi and gu systems are negative, except as otherwise noted.      Objective    /78   Pulse 97   Temp 99.5  F (37.5  C) (Tympanic)   SpO2 100%   There is no height or weight on file to calculate BMI.  Physical Exam   GENERAL: healthy, alert and no distress  HENT: normal cephalic/atraumatic, both ears: clear effusion, nose and mouth without ulcers or lesions, oropharynx clear and oral mucous membranes moist  NECK: no adenopathy  RESP: lungs clear to auscultation - no rales, rhonchi or wheezes  CV: regular rate and rhythm, normal S1 S2, no S3 or S4, no murmur, click or rub  PSYCH: mentation appears normal, affect normal/bright

## 2021-07-13 ENCOUNTER — OFFICE VISIT (OUTPATIENT)
Dept: FAMILY MEDICINE | Facility: CLINIC | Age: 31
End: 2021-07-13
Payer: COMMERCIAL

## 2021-07-13 DIAGNOSIS — E66.01 MORBID OBESITY (H): ICD-10-CM

## 2021-07-13 DIAGNOSIS — D50.9 IRON DEFICIENCY ANEMIA, UNSPECIFIED IRON DEFICIENCY ANEMIA TYPE: ICD-10-CM

## 2021-07-13 DIAGNOSIS — C56.2 MALIGNANT NEOPLASM OF LEFT OVARY (H): ICD-10-CM

## 2021-07-13 DIAGNOSIS — E11.9 TYPE 2 DIABETES MELLITUS WITHOUT COMPLICATION, WITHOUT LONG-TERM CURRENT USE OF INSULIN (H): Primary | ICD-10-CM

## 2021-07-13 DIAGNOSIS — N97.9 FEMALE INFERTILITY: ICD-10-CM

## 2021-07-13 LAB
ERYTHROCYTE [DISTWIDTH] IN BLOOD BY AUTOMATED COUNT: 15.6 % (ref 10–15)
HBA1C MFR BLD: 10 % (ref 0–5.6)
HCT VFR BLD AUTO: 32.9 % (ref 35–47)
HGB BLD-MCNC: 10.3 G/DL (ref 11.7–15.7)
MCH RBC QN AUTO: 21.1 PG (ref 26.5–33)
MCHC RBC AUTO-ENTMCNC: 31.3 G/DL (ref 31.5–36.5)
MCV RBC AUTO: 68 FL (ref 78–100)
PLATELET # BLD AUTO: 355 10E3/UL (ref 150–450)
RBC # BLD AUTO: 4.87 10E6/UL (ref 3.8–5.2)
WBC # BLD AUTO: 5.3 10E3/UL (ref 4–11)

## 2021-07-13 PROCEDURE — 36415 COLL VENOUS BLD VENIPUNCTURE: CPT | Performed by: NURSE PRACTITIONER

## 2021-07-13 PROCEDURE — 99207 PR FOOT EXAM NO CHARGE: CPT | Performed by: NURSE PRACTITIONER

## 2021-07-13 PROCEDURE — 99214 OFFICE O/P EST MOD 30 MIN: CPT | Performed by: NURSE PRACTITIONER

## 2021-07-13 PROCEDURE — 83036 HEMOGLOBIN GLYCOSYLATED A1C: CPT | Performed by: NURSE PRACTITIONER

## 2021-07-13 PROCEDURE — 85027 COMPLETE CBC AUTOMATED: CPT | Performed by: NURSE PRACTITIONER

## 2021-07-13 RX ORDER — SEMAGLUTIDE 1.34 MG/ML
INJECTION, SOLUTION SUBCUTANEOUS
Qty: 4 ML | Refills: 0 | Status: SHIPPED | OUTPATIENT
Start: 2021-07-13 | End: 2021-08-20

## 2021-07-13 RX ORDER — FERROUS GLUCONATE 324(38)MG
324 TABLET ORAL
Qty: 90 TABLET | Refills: 1 | Status: SHIPPED | OUTPATIENT
Start: 2021-07-13 | End: 2022-03-21

## 2021-07-13 NOTE — PROGRESS NOTES
"    Assessment & Plan     (E11.9) Type 2 diabetes mellitus without complication, without long-term current use of insulin (H)  (primary encounter diagnosis)  Comment: Uncontrolled will have patient start Ozempic patient is not taking glipizide  Plan: FOOT EXAM, Hemoglobin A1c, CBC with platelets,         Semaglutide,0.25 or 0.5MG/DOS, (OZEMPIC, 0.25         OR 0.5 MG/DOSE,) 2 MG/1.5ML SOPN, CANCELED:         Ob/Gyn Referral      (E66.01) Morbid obesity (H)  Comment: Reviewed healthy diet and exercise  Plan:     (C56.2) Malignant neoplasm of left ovary (H)  Comment: Patient only has one ovary will have patient follow-up with OB for infertility concerns  Plan:     (D50.9) Iron deficiency anemia, unspecified iron deficiency anemia type  Comment: *Based on hemoglobin levels will have patient restart iron levels  Plan: ferrous gluconate (FERGON) 324 (38 Fe) MG         tablet      (N97.9) Female infertility  Comment: Concerns her infertility will have patient follow-up with OB  Plan: Ob/Gyn Referral          Patient has underlying cough no concerns for Covid will have patient use over-the-counter cough medicine             BMI:   Estimated body mass index is 42.23 kg/m  as calculated from the following:    Height as of 3/30/21: 1.626 m (5' 4\").    Weight as of 3/30/21: 111.6 kg (246 lb).   Weight management plan: Discussed healthy diet and exercise guidelines    See Patient Instructions    Return in about 1 week (around 7/20/2021) for Follow up OBGYN .    PARAMJIT Martell CNP  [facility]    Bob Abraham is a 30 year old who presents for the following health issues     HPI     ENT Symptoms             Symptoms: cc Present Absent Comment   Fever/Chills   x    Fatigue   x    Muscle Aches   x    Eye Irritation   x    Sneezing   x    Nasal Davis/Drg   x    Sinus Pressure/Pain   x    Loss of smell   x    Dental pain   x    Sore Throat   x    Swollen Glands   x    Ear Pain/Fullness   x  "   Cough  x     Wheeze   x    Chest Pain   x    Shortness of breath   x    Rash   x    Other   x      Symptom duration:  a few days   Symptom severity:  moderate   Treatments tried:  none   Contacts:  none            Diabetes Follow-up    How often are you checking your blood sugar? One time daily  What time of day are you checking your blood sugars (select all that apply)?  After meals  Have you had any blood sugars above 200?  No  Have you had any blood sugars below 70?  No    What symptoms do you notice when your blood sugar is low?  None    What concerns do you have today about your diabetes? None     Do you have any of these symptoms? (Select all that apply)  No numbness or tingling in feet.  No redness, sores or blisters on feet.  No complaints of excessive thirst.  No reports of blurry vision.  No significant changes to weight.      BP Readings from Last 2 Encounters:   06/28/21 126/78   03/30/21 124/70     Hemoglobin A1C (%)   Date Value   07/13/2021 10.0 (H)   03/30/2021 9.6 (H)   10/26/2020 9.3 (H)     LDL Cholesterol Calculated (mg/dL)   Date Value   10/26/2020 70   07/23/2019 110 (H)         Review of Systems   Constitutional, HEENT, cardiovascular, pulmonary, gi and gu systems are negative, except as otherwise noted.      Objective    There were no vitals taken for this visit.  There is no height or weight on file to calculate BMI.  Physical Exam   GENERAL: healthy, alert and no distress  EYES: Eyes grossly normal to inspection, PERRL and conjunctivae and sclerae normal  HENT: ear canals and TM's normal, nose and mouth without ulcers or lesions  NECK: no adenopathy, no asymmetry, masses, or scars and thyroid normal to palpation  RESP: lungs clear to auscultation - no rales, rhonchi or wheezes  BREAST: normal without masses, tenderness or nipple discharge and no palpable axillary masses or adenopathy  CV: regular rate and rhythm, normal S1 S2, no S3 or S4, no murmur, click or rub, no peripheral edema and  peripheral pulses strong  ABDOMEN: soft, nontender, no hepatosplenomegaly, no masses and bowel sounds normal  MS: no gross musculoskeletal defects noted, no edema  SKIN: no suspicious lesions or rashes  NEURO: Normal strength and tone, mentation intact and speech normal  PSYCH: mentation appears normal, affect normal/bright    No results found for this or any previous visit (from the past 24 hour(s)).

## 2021-07-31 DIAGNOSIS — E11.9 TYPE 2 DIABETES MELLITUS WITHOUT COMPLICATION, WITHOUT LONG-TERM CURRENT USE OF INSULIN (H): ICD-10-CM

## 2021-08-03 RX ORDER — METFORMIN HCL 500 MG
1000 TABLET, EXTENDED RELEASE 24 HR ORAL 2 TIMES DAILY WITH MEALS
Qty: 360 TABLET | Refills: 1 | Status: SHIPPED | OUTPATIENT
Start: 2021-08-03 | End: 2022-03-21

## 2021-08-20 ENCOUNTER — MYC REFILL (OUTPATIENT)
Dept: FAMILY MEDICINE | Facility: CLINIC | Age: 31
End: 2021-08-20

## 2021-08-20 DIAGNOSIS — E11.9 TYPE 2 DIABETES MELLITUS WITHOUT COMPLICATION, WITHOUT LONG-TERM CURRENT USE OF INSULIN (H): ICD-10-CM

## 2021-08-24 RX ORDER — SEMAGLUTIDE 1.34 MG/ML
0.5 INJECTION, SOLUTION SUBCUTANEOUS
Qty: 6 ML | Refills: 0 | Status: SHIPPED | OUTPATIENT
Start: 2021-08-24 | End: 2021-11-28

## 2021-09-18 ENCOUNTER — HEALTH MAINTENANCE LETTER (OUTPATIENT)
Age: 31
End: 2021-09-18

## 2021-09-23 ENCOUNTER — OFFICE VISIT (OUTPATIENT)
Dept: OBGYN | Facility: CLINIC | Age: 31
End: 2021-09-23
Payer: COMMERCIAL

## 2021-09-23 VITALS
RESPIRATION RATE: 18 BRPM | HEART RATE: 97 BPM | TEMPERATURE: 100 F | HEIGHT: 64 IN | SYSTOLIC BLOOD PRESSURE: 135 MMHG | BODY MASS INDEX: 39.44 KG/M2 | DIASTOLIC BLOOD PRESSURE: 74 MMHG | WEIGHT: 231 LBS

## 2021-09-23 DIAGNOSIS — Z79.4 TYPE 2 DIABETES MELLITUS WITHOUT COMPLICATION, WITH LONG-TERM CURRENT USE OF INSULIN (H): ICD-10-CM

## 2021-09-23 DIAGNOSIS — N97.9 FEMALE INFERTILITY: Primary | ICD-10-CM

## 2021-09-23 DIAGNOSIS — E11.9 TYPE 2 DIABETES MELLITUS WITHOUT COMPLICATION, WITH LONG-TERM CURRENT USE OF INSULIN (H): ICD-10-CM

## 2021-09-23 DIAGNOSIS — D50.8 IRON DEFICIENCY ANEMIA SECONDARY TO INADEQUATE DIETARY IRON INTAKE: ICD-10-CM

## 2021-09-23 PROBLEM — D50.9 MICROCYTIC ANEMIA: Status: ACTIVE | Noted: 2021-09-23

## 2021-09-23 PROBLEM — A49.1 GBS (GROUP B STREPTOCOCCUS) INFECTION: Status: RESOLVED | Noted: 2019-06-11 | Resolved: 2021-09-23

## 2021-09-23 LAB
ERYTHROCYTE [DISTWIDTH] IN BLOOD BY AUTOMATED COUNT: 17.3 % (ref 10–15)
HBA1C MFR BLD: 8 % (ref 0–5.6)
HCT VFR BLD AUTO: 35.6 % (ref 35–47)
HGB BLD-MCNC: 11.1 G/DL (ref 11.7–15.7)
MCH RBC QN AUTO: 22.6 PG (ref 26.5–33)
MCHC RBC AUTO-ENTMCNC: 31.2 G/DL (ref 31.5–36.5)
MCV RBC AUTO: 73 FL (ref 78–100)
PLATELET # BLD AUTO: 356 10E3/UL (ref 150–450)
RBC # BLD AUTO: 4.91 10E6/UL (ref 3.8–5.2)
WBC # BLD AUTO: 8.2 10E3/UL (ref 4–11)

## 2021-09-23 PROCEDURE — 87076 CULTURE ANAEROBE IDENT EACH: CPT | Performed by: OBSTETRICS & GYNECOLOGY

## 2021-09-23 PROCEDURE — G0145 SCR C/V CYTO,THINLAYER,RESCR: HCPCS | Performed by: OBSTETRICS & GYNECOLOGY

## 2021-09-23 PROCEDURE — 36415 COLL VENOUS BLD VENIPUNCTURE: CPT | Performed by: OBSTETRICS & GYNECOLOGY

## 2021-09-23 PROCEDURE — 85027 COMPLETE CBC AUTOMATED: CPT | Performed by: OBSTETRICS & GYNECOLOGY

## 2021-09-23 PROCEDURE — 83002 ASSAY OF GONADOTROPIN (LH): CPT | Performed by: OBSTETRICS & GYNECOLOGY

## 2021-09-23 PROCEDURE — 83001 ASSAY OF GONADOTROPIN (FSH): CPT | Performed by: OBSTETRICS & GYNECOLOGY

## 2021-09-23 PROCEDURE — 87109 MYCOPLASMA: CPT | Performed by: OBSTETRICS & GYNECOLOGY

## 2021-09-23 PROCEDURE — 83036 HEMOGLOBIN GLYCOSYLATED A1C: CPT | Performed by: OBSTETRICS & GYNECOLOGY

## 2021-09-23 PROCEDURE — 87624 HPV HI-RISK TYP POOLED RSLT: CPT | Performed by: OBSTETRICS & GYNECOLOGY

## 2021-09-23 PROCEDURE — 99204 OFFICE O/P NEW MOD 45 MIN: CPT | Performed by: OBSTETRICS & GYNECOLOGY

## 2021-09-23 PROCEDURE — 82670 ASSAY OF TOTAL ESTRADIOL: CPT | Performed by: OBSTETRICS & GYNECOLOGY

## 2021-09-23 ASSESSMENT — MIFFLIN-ST. JEOR: SCORE: 1747.81

## 2021-09-23 NOTE — PATIENT INSTRUCTIONS
In order to further evaluate for infertility, you will need the following tests: Keep these instructions so you are able to inform the lab of which tests you are needing at each lab visit.    -- when your next period starts, the first day of bleeding is called cycle day 1;  on cycle day 2 or 3, go to the lab and have blood work for Estradiol, LH  FSH, CBC and Hgb A1c    -- on cycle day 9 or 10, you need to have a Hysterosalpingogram (HSG); this is a test done to check your uterus and tubes through the diagnostic imaging department at [facility]  You will also need a pelvic ultrasound; this could be done on the same day as your HSG.    -- on cycle day 21 of this cycle, go to the lab and obtain blood tests for Progesterone, Prolactin, Anti-Mullerian Hormone and TSH (thyroid)    -- your partner needs to obtain and submit a semen sample for Semen Analysis (best if 3 days of no ejaculation prior, and keep the specimen warm until it arrives at the specialized lab), we will be giving you a separate request for this test    -- you should be taking a vitamin with Folic Acid to prevent birth defects.    -- make a follow up appointment with Dr. Fortune after all the above testing is done in order to review results and discuss plans moving forward

## 2021-09-23 NOTE — NURSING NOTE
"Initial /74 (BP Location: Right arm, Patient Position: Chair, Cuff Size: Adult Large)   Pulse 97   Temp 100  F (37.8  C) (Tympanic)   Resp 18   Ht 1.626 m (5' 4\")   Wt 104.8 kg (231 lb)   LMP 09/20/2021   Breastfeeding No   BMI 39.65 kg/m   Estimated body mass index is 39.65 kg/m  as calculated from the following:    Height as of this encounter: 1.626 m (5' 4\").    Weight as of this encounter: 104.8 kg (231 lb). .      "

## 2021-09-23 NOTE — PROGRESS NOTES
SUBJECTIVE:  Leigh is a 30 year old  0 para 0 Tab 0 Sab 0  female who presents with complaint of difficulty with conception for 3+ years.  Patient's last menstrual period was 2021. and her menstrual periods are regular, occuring every 28 days, heavy, changing pad/tampon every 90 mins, painful and associated with premenstrual molimina. SHe states her last cycle was 5 days delayed.    Her most recent form of birth control was OCP, , last used 3-4 years ago.    OB history: never pregnant    GYN history: Pap smears Abnormal, HR HPV  history of STD No STD history  history of PID, no  prior LEEP or cervical cone bx, no  history of endometriosis, no  GYN surgery:  laparoscopic RSO  for large benign ovarian serous cystadenoma    Her Medical history is remarkable for poorly controlled DM2, last A1C 10.0 ; recently started on Ozempic    Iron deficiency microcystic anemia--?chronic blood loss; on po iron x 1 year    Her partner is a 37 year old who has fathered children previously 7 years ago  He has no exposure to toxins, radiation or excessive heat; he has history of single ventricle, on [place] and baby ASA.    Previous infertility workup or treatment: none    CURRENT PROBLEM LIST:    Patient Active Problem List    Diagnosis Date Noted     Iron deficiency anemia secondary to inadequate dietary iron intake 2021     Priority: Medium     Morbid obesity (H) 06/10/2019     Priority: Medium     Diabetes mellitus, type 2 (H) 2019     Priority: Medium     : A1C=10.0       ASCUS with positive high risk HPV cervical 2015     Priority: Medium     1/15/2015:Pap--ASCUS, +HR HPV. Plan [place]-  4/3/15: [place] Bx & ECC - atypia. Plan cotest in 1 year.   17 Would consider patient to be lost to follow-up.  19 NIL Pap, + HR HPV (Neg 16/18) @ 28 yrs old. Plan colp  10/1/19 [place] Bx - Negative, ECC - focal atypia. NIL Pap, + HR HPV (Neg 16/18). Plan cotest in 1 year.   20 My Chart  Reminder Sent. -- read  2/3/21 Reminder call -- left message  3/3/21  Lost to follow-up for pap tracking           CARDIOVASCULAR SCREENING; LDL GOAL LESS THAN 160 10/31/2010     Priority: Medium     ACNE 11/02/2006     Priority: Medium     November 2, 2006: education, hand-outs given, start Neutrogena facial products, Medications: Retin A, EES.       left ovarian serous cystadenoma. 07/31/2006     Priority: Medium     right ovarian mass found on exam and confirmed by u/s and CT 2006  . Dr. Albert Razo laporascopy removal of Right ovary, mass, and fallopian tube. Pathology showed left ovarian serous cystadenoma.          REVIEW OF SYSTEMS:  All systems were reviewed and pertinent information in noted in subjective/HPI.    PAST MEDICAL HISTORY:    Past Medical History:   Diagnosis Date     ASCUS with positive high risk HPV 1/27/2015    1/15/2015:Pap--ASCUS, +HR HPV. Plan [place]-      Cervical high risk HPV (human papillomavirus) test positive 2019    see problem list     H/O colposcopy with cervical biopsy 4/2015    atypia     Pilonidal abscess 5/10/2010       PAST SURGICAL HISTORY:    Past Surgical History:   Procedure Laterality Date     CYSTECTOMY PILONIDAL  05/17/2012    Procedure:CYSTECTOMY PILONIDAL; Pilonidal cystectomy; Surgeon:PHONG STALEY; Location:[place] [facility]     INCISION AND DRAINAGE, ABSCESS, SIMPLE      pilonidal abscess     LAPAROSCOPIC SALPINGOOPHERECTOMY Right 07/01/2006    rt ovary removed       MEDICATIONS:    Current Outpatient Medications   Medication Sig Dispense Refill     blood glucose (ACCU-CHEK GUIDE) test strip Use to test blood sugar 1 times daily or as directed. 100 each 11     blood glucose monitoring (ACCU-CHEK FASTCLIX) lancets Use to test blood sugar 1 times daily or as directed. 102 each 11     ferrous gluconate (FERGON) 324 (38 Fe) MG tablet Take 1 tablet (324 mg) by mouth daily (with breakfast) 90 tablet 1     metFORMIN (GLUCOPHAGE-XR) 500 MG 24 hr tablet TAKE 2 TABLETS (1,000 MG) BY MOUTH  2 TIMES DAILY (WITH MEALS) 360 tablet 1     Semaglutide,0.25 or 0.5MG/DOS, (OZEMPIC, 0.25 OR 0.5 MG/DOSE,) 2 MG/1.5ML SOPN Inject 0.5 mg Subcutaneous every 7 days 6 mL 0       ALLERGIES:  Nkda [no known drug allergies]    SOCIAL HISTORY:    Social History     Socioeconomic History     Marital status:      Spouse name: None     Number of children: None     Years of education: None     Highest education level: None   Occupational History     None   Tobacco Use     Smoking status: Never Smoker     Smokeless tobacco: Never Used   Vaping Use     Vaping Use: Never used   Substance and Sexual Activity     Alcohol use: No     Drug use: No     Sexual activity: Yes     Partners: Male   Other Topics Concern     Parent/sibling w/ CABG, MI or angioplasty before 65F 55M? No   Social History Narrative     None     Social Determinants of Health     Financial Resource Strain:      Difficulty of Paying Living Expenses:    Food Insecurity:      Worried About Running Out of Food in the Last Year:      Ran Out of Food in the Last Year:    Transportation Needs:      Lack of Transportation (Medical):      Lack of Transportation (Non-Medical):    Physical Activity:      Days of Exercise per Week:      Minutes of Exercise per Session:    Stress:      Feeling of Stress :    Social Connections:      Frequency of Communication with Friends and Family:      Frequency of Social Gatherings with Friends and Family:      Attends Christianity Services:      Active Member of Clubs or Organizations:      Attends Club or Organization Meetings:      Marital Status:    Intimate Partner Violence:      Fear of Current or Ex-Partner:      Emotionally Abused:      Physically Abused:      Sexually Abused:        FAMILY HISTORY:    Family History   Problem Relation Age of Onset     Diabetes Father      Hypertension Father      Cardiovascular Maternal Grandfather         MI 42     Diabetes Paternal Grandmother      Hypertension Paternal Grandmother   "      PHYSICAL EXAM: /74 (BP Location: Right arm, Patient Position: Chair, Cuff Size: Adult Large)   Pulse 97   Temp 100  F (37.8  C) (Tympanic)   Resp 18   Ht 1.626 m (5' 4\")   Wt 104.8 kg (231 lb)   LMP 09/20/2021   Breastfeeding No   BMI 39.65 kg/m       GENERAL APPEARANCE: healthy, alert and no distress  ABDOMEN:  soft, nontender, no hepato-splenomegaly or hernias  PELVIC:  EGBUS:  Blood on vulva and inner thighs  VAGINA:  Moderate blood with clots in vault  CERVIX:  smooth, non-friable, no gross lesions, thin-layer PAP was taken   UTERUS:  anteverted, not enlarged, non tender  ADNEXAE:  non-tender, no masses palpable, no cul de sac nodularity    ASSESSMENT:   Encounter Diagnoses   Name Primary?     Female infertility Yes     Type 2 diabetes mellitus without complication, with long-term current use of insulin (H)      Iron deficiency anemia secondary to inadequate dietary iron intake          PLAN:   Patient Instructions   In order to further evaluate for infertility, you will need the following tests: Keep these instructions so you are able to inform the lab of which tests you are needing at each lab visit.    -- when your next period starts, the first day of bleeding is called cycle day 1;  on cycle day 2 or 3, go to the lab and have blood work for Estradiol, LH  FSH, CBC and Hgb A1c    -- on cycle day 9 or 10, you need to have a Hysterosalpingogram (HSG); this is a test done to check your uterus and tubes through the diagnostic imaging department at [facility]  You will also need a pelvic ultrasound; this could be done on the same day as your HSG.    -- on cycle day 21 of this cycle, go to the lab and obtain blood tests for Progesterone, Prolactin, Anti-Mullerian Hormone and TSH (thyroid)    -- your partner needs to obtain and submit a semen sample for Semen Analysis (best if 3 days of no ejaculation prior, and keep the specimen warm until it arrives at the specialized lab), " we will be giving you a separate request for this test    -- you should be taking a vitamin with Folic Acid to prevent birth defects.    -- make a follow up appointment with Dr. Fortune after all the above testing is done in order to review results and discuss plans moving forward      Consider: IV iron infusion, GI workup, referral to endocrinology  Mariela Fortune MD  [facility]

## 2021-09-24 LAB
ESTRADIOL SERPL-MCNC: 38 PG/ML
FSH SERPL-ACNC: 3.9 IU/L
LH SERPL-ACNC: 2.9 IU/L

## 2021-09-27 ENCOUNTER — LAB (OUTPATIENT)
Dept: LAB | Facility: CLINIC | Age: 31
End: 2021-09-27
Payer: COMMERCIAL

## 2021-09-27 DIAGNOSIS — Z11.59 ENCOUNTER FOR SCREENING FOR OTHER VIRAL DISEASES: ICD-10-CM

## 2021-09-27 DIAGNOSIS — Z11.59 ENCOUNTER FOR SCREENING FOR OTHER VIRAL DISEASES: Primary | ICD-10-CM

## 2021-09-27 PROCEDURE — U0003 INFECTIOUS AGENT DETECTION BY NUCLEIC ACID (DNA OR RNA); SEVERE ACUTE RESPIRATORY SYNDROME CORONAVIRUS 2 (SARS-COV-2) (CORONAVIRUS DISEASE [COVID-19]), AMPLIFIED PROBE TECHNIQUE, MAKING USE OF HIGH THROUGHPUT TECHNOLOGIES AS DESCRIBED BY CMS-2020-01-R: HCPCS

## 2021-09-27 PROCEDURE — U0005 INFEC AGEN DETEC AMPLI PROBE: HCPCS

## 2021-09-28 ENCOUNTER — HOSPITAL ENCOUNTER (OUTPATIENT)
Dept: ULTRASOUND IMAGING | Facility: CLINIC | Age: 31
Discharge: HOME | End: 2021-09-28
Attending: OBSTETRICS & GYNECOLOGY | Admitting: OBSTETRICS & GYNECOLOGY
Payer: COMMERCIAL

## 2021-09-28 DIAGNOSIS — N97.9 FEMALE INFERTILITY: ICD-10-CM

## 2021-09-28 LAB
BKR LAB AP GYN ADEQUACY: NORMAL
BKR LAB AP GYN INTERPRETATION: NORMAL
BKR LAB AP HPV REFLEX: NORMAL
BKR LAB AP LMP: NORMAL
BKR LAB AP PREVIOUS ABNORMAL: NORMAL
PATH REPORT.COMMENTS IMP SPEC: NORMAL
PATH REPORT.RELEVANT HX SPEC: NORMAL
SARS-COV-2 RNA RESP QL NAA+PROBE: NEGATIVE

## 2021-09-28 PROCEDURE — 76830 TRANSVAGINAL US NON-OB: CPT

## 2021-09-30 ENCOUNTER — HOSPITAL ENCOUNTER (OUTPATIENT)
Dept: GENERAL RADIOLOGY | Facility: CLINIC | Age: 31
Discharge: HOME | End: 2021-09-30
Attending: OBSTETRICS & GYNECOLOGY | Admitting: OBSTETRICS & GYNECOLOGY
Payer: COMMERCIAL

## 2021-09-30 DIAGNOSIS — N97.9 FEMALE INFERTILITY: ICD-10-CM

## 2021-09-30 LAB
HUMAN PAPILLOMA VIRUS 16 DNA: NEGATIVE
HUMAN PAPILLOMA VIRUS 18 DNA: NEGATIVE
HUMAN PAPILLOMA VIRUS FINAL DIAGNOSIS: NORMAL
HUMAN PAPILLOMA VIRUS OTHER HR: NEGATIVE

## 2021-09-30 PROCEDURE — 58340 CATHETER FOR HYSTEROGRAPHY: CPT

## 2021-09-30 PROCEDURE — 74740 X-RAY FEMALE GENITAL TRACT: CPT

## 2021-09-30 PROCEDURE — 250N000011 HC RX IP 250 OP 636: Performed by: OBSTETRICS & GYNECOLOGY

## 2021-09-30 RX ORDER — IOPAMIDOL 612 MG/ML
45 INJECTION, SOLUTION INTRATHECAL ONCE
Status: COMPLETED | OUTPATIENT
Start: 2021-09-30 | End: 2021-09-30

## 2021-09-30 RX ORDER — IOPAMIDOL 612 MG/ML
45 INJECTION, SOLUTION INTRAVASCULAR ONCE
Status: DISCONTINUED | OUTPATIENT
Start: 2021-09-30 | End: 2021-09-30 | Stop reason: CLARIF

## 2021-09-30 RX ADMIN — IOPAMIDOL 15 ML: 612 INJECTION, SOLUTION INTRATHECAL at 12:20

## 2021-10-01 ENCOUNTER — PATIENT OUTREACH (OUTPATIENT)
Dept: OBGYN | Facility: CLINIC | Age: 31
End: 2021-10-01

## 2021-10-01 DIAGNOSIS — R87.610 ASCUS WITH POSITIVE HIGH RISK HPV CERVICAL: ICD-10-CM

## 2021-10-01 DIAGNOSIS — R87.810 ASCUS WITH POSITIVE HIGH RISK HPV CERVICAL: ICD-10-CM

## 2021-10-01 DIAGNOSIS — N97.9 FEMALE INFERTILITY: Primary | ICD-10-CM

## 2021-10-01 LAB — BACTERIA SPEC CULT: ABNORMAL

## 2021-10-01 RX ORDER — DOXYCYCLINE 100 MG/1
100 CAPSULE ORAL 2 TIMES DAILY
Qty: 14 CAPSULE | Refills: 0 | Status: SHIPPED | OUTPATIENT
Start: 2021-10-01 | End: 2021-10-08

## 2021-10-01 NOTE — RESULT ENCOUNTER NOTE
Call to pt to notify of below.  Unable to reach.  Left message for pt to call back     Anette Walls   [facility]  RN

## 2021-10-01 NOTE — RESULT ENCOUNTER NOTE
Pt notified of below.  Pt reports understanding.  Prescription sent to pharmacy.  Prescription also sent for Moises Foreman.  Warning that ferrous gluconate can decrease effectiveness of recommended med.  Patient will check with pharmacist.  May stop the ferrous gluconate for the week she take the Doxy.  Pt does not have further questions or concerns.    Anette Walls   [facility]  RN

## 2021-10-11 ENCOUNTER — LAB (OUTPATIENT)
Dept: LAB | Facility: CLINIC | Age: 31
End: 2021-10-11
Payer: COMMERCIAL

## 2021-10-11 DIAGNOSIS — N97.9 FEMALE INFERTILITY: ICD-10-CM

## 2021-10-11 LAB
PROGEST SERPL-MCNC: 3.3 NG/ML
PROLACTIN SERPL-MCNC: 20 UG/L (ref 3–27)
TSH SERPL DL<=0.005 MIU/L-ACNC: 1.96 MU/L (ref 0.4–4)

## 2021-10-11 PROCEDURE — 84144 ASSAY OF PROGESTERONE: CPT

## 2021-10-11 PROCEDURE — 36415 COLL VENOUS BLD VENIPUNCTURE: CPT

## 2021-10-11 PROCEDURE — 83520 IMMUNOASSAY QUANT NOS NONAB: CPT | Mod: 90

## 2021-10-11 PROCEDURE — 84443 ASSAY THYROID STIM HORMONE: CPT

## 2021-10-11 PROCEDURE — 84146 ASSAY OF PROLACTIN: CPT

## 2021-10-11 PROCEDURE — 99000 SPECIMEN HANDLING OFFICE-LAB: CPT

## 2021-10-14 LAB — MIS SERPL-MCNC: 5.74 NG/ML

## 2021-11-28 ENCOUNTER — MYC REFILL (OUTPATIENT)
Dept: FAMILY MEDICINE | Facility: CLINIC | Age: 31
End: 2021-11-28
Payer: COMMERCIAL

## 2021-11-28 DIAGNOSIS — E11.9 TYPE 2 DIABETES MELLITUS WITHOUT COMPLICATION, WITHOUT LONG-TERM CURRENT USE OF INSULIN (H): ICD-10-CM

## 2021-12-01 RX ORDER — SEMAGLUTIDE 1.34 MG/ML
0.5 INJECTION, SOLUTION SUBCUTANEOUS
Qty: 6 ML | Refills: 0 | Status: SHIPPED | OUTPATIENT
Start: 2021-12-01 | End: 2022-03-21

## 2022-01-08 ENCOUNTER — HEALTH MAINTENANCE LETTER (OUTPATIENT)
Age: 32
End: 2022-01-08

## 2022-02-28 ENCOUNTER — TELEPHONE (OUTPATIENT)
Dept: FAMILY MEDICINE | Facility: CLINIC | Age: 32
End: 2022-02-28
Payer: COMMERCIAL

## 2022-02-28 NOTE — TELEPHONE ENCOUNTER
Patient Quality Outreach    Patient is due for the following:   Diabetes -  A1C, LDL (Fasting), Eye Exam and Microalbumin    NEXT STEPS:   Schedule a office visit for a diabetes check    Type of outreach:    Phone, left message for patient/parent to call back.      Questions for provider review:    None     Ro Trivedi, [facility]

## 2022-03-07 ENCOUNTER — OFFICE VISIT (OUTPATIENT)
Dept: OBGYN | Facility: CLINIC | Age: 32
End: 2022-03-07
Payer: COMMERCIAL

## 2022-03-07 VITALS
SYSTOLIC BLOOD PRESSURE: 138 MMHG | DIASTOLIC BLOOD PRESSURE: 85 MMHG | WEIGHT: 239 LBS | TEMPERATURE: 98.8 F | HEART RATE: 104 BPM | BODY MASS INDEX: 41.02 KG/M2

## 2022-03-07 DIAGNOSIS — N97.9 FEMALE INFERTILITY: ICD-10-CM

## 2022-03-07 DIAGNOSIS — C56.2 MALIGNANT NEOPLASM OF LEFT OVARY (H): ICD-10-CM

## 2022-03-07 PROCEDURE — 99213 OFFICE O/P EST LOW 20 MIN: CPT | Performed by: OBSTETRICS & GYNECOLOGY

## 2022-03-07 RX ORDER — CLOMIPHENE CITRATE 50 MG/1
50 TABLET ORAL DAILY
Qty: 5 TABLET | Refills: 3 | Status: SHIPPED | OUTPATIENT
Start: 2022-03-07 | End: 2022-06-14

## 2022-03-07 NOTE — PATIENT INSTRUCTIONS
"Start Clomid on cycle day 3, with day 1 identified as first day of next menses.    Obtain over the counter ovulation predictor kit, and start using as directed by the kit daily  on cycle day 10 (up to day 15 if not positive prior).    You should plan on having sexual relations the day after you see the positive LH surge (positive reaction on above kit)    Another option would be to plan on sexual relations, cycle day 12, 14 and 16    You will obtain a blood test for progesterone on cycle day 21 and we will be in contact with the results.    We may adjust the dose of medication based upon your progesterone response (ideal 9 or greater)    Typically, medication is used for 4-6 ovulatory cycles; in the future we may decide to add medication to further facilitate ovulation (HCG injection) or consider artificial insemination of your partner's semen to enhance likelihood of conception.    If you do not respond to medication (do not \"ovulate\"), then we may try a completely different medication called Letrazole (Femara) or consider referral to a Reproductive Endocrinologist for more sophisticated ovulation induction medication called Gonadotropin super-ovulation.    It is important that you continue a multiple vitamin with Folic Acid.  "

## 2022-03-07 NOTE — PROGRESS NOTES
Leigh is a 31 year old   female who presents for review of all fertility testing and to make plan moving forward.  Reviewed all bloodwork and imaging with pt, with most significant findings being, A1C=8.0  , prog=3.3  Normal SA.    Patient Active Problem List    Diagnosis Date Noted     Female infertility 2022     Priority: Medium     Workup: HSG patent right tube (left side surgically absent)  Ureaplasma positive--pt and partner treated with Doxy  Day 2: E2, FSH, LH WNL; Hgb A1c improved to 8; Hgb=11.1 (MCV low c/w low iron)  Day 21: PL, TSH, AMH WNL; progesterone LOW (3.3)         Iron deficiency anemia secondary to inadequate dietary iron intake 2021     Priority: Medium     Morbid obesity (H) 06/10/2019     Priority: Medium     Diabetes mellitus, type 2 (H) 2019     Priority: Medium     : A1C=10.0       ASCUS with positive high risk HPV cervical 2015     Priority: Medium     1/15/2015:Pap--ASCUS, +HR HPV. Plan [place]-  4/3/15: [place] Bx & ECC - atypia. Plan cotest in 1 year.   17 Would consider patient to be lost to follow-up.  19 NIL Pap, + HR HPV (Neg 16/18) @ 28 yrs old. Plan colp  10/1/19 [place] Bx - Negative, ECC - focal atypia. NIL Pap, + HR HPV (Neg 16/18). Plan cotest in 1 year.   3/3/21  Lost to follow-up for pap tracking  21 NIL pap, Neg HPV. cotest in 1 year due bef 9/23/22  10/1/21 Result letter sent via [employer].            CARDIOVASCULAR SCREENING; LDL GOAL LESS THAN 160 10/31/2010     Priority: Medium     ACNE 2006     Priority: Medium     2006: education, hand-outs given, start Neutrogena facial products, Medications: Retin A, EES.       Left ovarian serous cystadenoma 2006     Priority: Medium     Large pelvic/0varian mass found on exam and confirmed by u/s and CT   . Dr. Price Downs laporascopy removal of  ovary, mass, and fallopian tube. Pathology showed left ovarian serous cystadenoma.          All systems were reviewed  and pertinent information in noted in subjective/HPI.    Past Medical History:   Diagnosis Date     ASCUS with positive high risk HPV 1/27/2015    1/15/2015:Pap--ASCUS, +HR HPV. Plan [place]-      Cervical high risk HPV (human papillomavirus) test positive 2019    see problem list     H/O colposcopy with cervical biopsy 4/2015    atypia     Pilonidal abscess 5/10/2010       Past Surgical History:   Procedure Laterality Date     CYSTECTOMY PILONIDAL  05/17/2012    Procedure:CYSTECTOMY PILONIDAL; Pilonidal cystectomy; Surgeon:PHONG STALEY; Location:[place] [facility]     INCISION AND DRAINAGE, ABSCESS, SIMPLE      pilonidal abscess     LAPAROSCOPIC SALPINGOOPHERECTOMY Left 07/01/2006    serous cystadenoma, Dr. Albert Razo         Current Outpatient Medications:      blood glucose (ACCU-CHEK GUIDE) test strip, Use to test blood sugar 1 times daily or as directed., Disp: 100 each, Rfl: 11     blood glucose monitoring (ACCU-CHEK FASTCLIX) lancets, Use to test blood sugar 1 times daily or as directed., Disp: 102 each, Rfl: 11     clomiPHENE (CLOMID) 50 MG tablet, Take 1 tablet (50 mg) by mouth daily for 5 days Cycle day 3-7, Disp: 5 tablet, Rfl: 3     ferrous gluconate (FERGON) 324 (38 Fe) MG tablet, Take 1 tablet (324 mg) by mouth daily (with breakfast), Disp: 90 tablet, Rfl: 1     metFORMIN (GLUCOPHAGE-XR) 500 MG 24 hr tablet, TAKE 2 TABLETS (1,000 MG) BY MOUTH 2 TIMES DAILY (WITH MEALS), Disp: 360 tablet, Rfl: 1     semaglutide (OZEMPIC, 0.25 OR 0.5 MG/DOSE,) 2 MG/1.5ML SOPN pen, Inject 0.5 mg Subcutaneous every 7 days, Disp: 6 mL, Rfl: 0    ALLERGIES:  Nkda [no known drug allergies]    Social History     Socioeconomic History     Marital status:      Spouse name: Not on file     Number of children: Not on file     Years of education: Not on file     Highest education level: Not on file   Occupational History     Not on file   Tobacco Use     Smoking status: Never Smoker     Smokeless tobacco: Never Used   Vaping Use     Vaping  Use: Never used   Substance and Sexual Activity     Alcohol use: No     Drug use: No     Sexual activity: Yes     Partners: Male   Other Topics Concern     Parent/sibling w/ CABG, MI or angioplasty before 65F 55M? No   Social History Narrative     Not on file     Social Determinants of Health     Financial Resource Strain: Not on file   Food Insecurity: Not on file   Transportation Needs: Not on file   Physical Activity: Not on file   Stress: Not on file   Social Connections: Not on file   Intimate Partner Violence: Not on file   Housing Stability: Not on file       Family History   Problem Relation Age of Onset     Diabetes Father      Hypertension Father      Cardiovascular Maternal Grandfather         MI 42     Diabetes Paternal Grandmother      Hypertension Paternal Grandmother        OBJECTIVE:  Vitals: /85 (BP Location: Right arm, Patient Position: Chair, Cuff Size: Adult Regular)   Pulse 104   Temp 98.8  F (37.1  C) (Tympanic)   Wt 108.4 kg (239 lb)   LMP 03/06/2022 (Exact Date)   Breastfeeding No   BMI 41.02 kg/m   BMI= Body mass index is 41.02 kg/m .   Patient's last menstrual period was 03/06/2022 (exact date).     GENERAL APPEARANCE: healthy, alert and no distress    ASSESSMENT:      ICD-10-CM    1. Female infertility  N97.9 Progesterone     clomiPHENE (CLOMID) 50 MG tablet   2. Left ovarian serous cystadenoma  C56.2        PLAN:  I discussed with pt that the most compelling finding is her low day 21 progesterone level, c/w poor ovulation; her A1C is improved, but not ideal.  Her BMI is elevated.  I discussed ovulation induction, the impact of elevated BS on success of pregnancy and impact on structure of baby as well.  She is advised to remain; on Metformin and Ozempic, but to stop Ozempic with conception  Will start with Clomid for OI    Patient Instructions   Start Clomid on cycle day 3, with day 1 identified as first day of next menses.    Obtain over the counter ovulation predictor kit,  "and start using as directed by the kit daily  on cycle day 10 (up to day 15 if not positive prior).    You should plan on having sexual relations the day after you see the positive LH surge (positive reaction on above kit)    Another option would be to plan on sexual relations, cycle day 12, 14 and 16    You will obtain a blood test for progesterone on cycle day 21 and we will be in contact with the results.    We may adjust the dose of medication based upon your progesterone response (ideal 9 or greater)    Typically, medication is used for 4-6 ovulatory cycles; in the future we may decide to add medication to further facilitate ovulation (HCG injection) or consider artificial insemination of your partner's semen to enhance likelihood of conception.    If you do not respond to medication (do not \"ovulate\"), then we may try a completely different medication called Letrazole (Femara) or consider referral to a Reproductive Endocrinologist for more sophisticated ovulation induction medication called Gonadotropin super-ovulation.    It is important that you continue a multiple vitamin with Folic Acid.    Mariela Fortune MD  [facility]      Mariela Fortune MD    "

## 2022-03-07 NOTE — NURSING NOTE
"Initial /85 (BP Location: Right arm, Patient Position: Chair, Cuff Size: Adult Regular)   Pulse 104   Temp 98.8  F (37.1  C) (Tympanic)   Wt 108.4 kg (239 lb)   LMP 03/06/2022 (Exact Date)   Breastfeeding No   BMI 41.02 kg/m   Estimated body mass index is 41.02 kg/m  as calculated from the following:    Height as of 9/23/21: 1.626 m (5' 4\").    Weight as of this encounter: 108.4 kg (239 lb). .    Starla Gonzalez MA    "

## 2022-03-21 ENCOUNTER — OFFICE VISIT (OUTPATIENT)
Dept: FAMILY MEDICINE | Facility: CLINIC | Age: 32
End: 2022-03-21
Payer: COMMERCIAL

## 2022-03-21 ENCOUNTER — TELEPHONE (OUTPATIENT)
Dept: FAMILY MEDICINE | Facility: CLINIC | Age: 32
End: 2022-03-21

## 2022-03-21 VITALS
BODY MASS INDEX: 40.72 KG/M2 | TEMPERATURE: 98.9 F | WEIGHT: 237.2 LBS | DIASTOLIC BLOOD PRESSURE: 80 MMHG | RESPIRATION RATE: 16 BRPM | HEART RATE: 80 BPM | SYSTOLIC BLOOD PRESSURE: 102 MMHG

## 2022-03-21 DIAGNOSIS — Z11.59 NEED FOR HEPATITIS C SCREENING TEST: ICD-10-CM

## 2022-03-21 DIAGNOSIS — D50.9 IRON DEFICIENCY ANEMIA, UNSPECIFIED IRON DEFICIENCY ANEMIA TYPE: ICD-10-CM

## 2022-03-21 DIAGNOSIS — E66.01 MORBID OBESITY (H): ICD-10-CM

## 2022-03-21 DIAGNOSIS — Z79.4 TYPE 2 DIABETES MELLITUS WITHOUT COMPLICATION, WITH LONG-TERM CURRENT USE OF INSULIN (H): Primary | ICD-10-CM

## 2022-03-21 DIAGNOSIS — E11.9 TYPE 2 DIABETES MELLITUS WITHOUT COMPLICATION, WITHOUT LONG-TERM CURRENT USE OF INSULIN (H): ICD-10-CM

## 2022-03-21 DIAGNOSIS — Z13.220 SCREENING FOR HYPERLIPIDEMIA: ICD-10-CM

## 2022-03-21 DIAGNOSIS — Z11.4 SCREENING FOR HIV (HUMAN IMMUNODEFICIENCY VIRUS): ICD-10-CM

## 2022-03-21 DIAGNOSIS — E11.9 TYPE 2 DIABETES MELLITUS WITHOUT COMPLICATION, WITH LONG-TERM CURRENT USE OF INSULIN (H): Primary | ICD-10-CM

## 2022-03-21 LAB
ANION GAP SERPL CALCULATED.3IONS-SCNC: 8 MMOL/L (ref 3–14)
BUN SERPL-MCNC: 13 MG/DL (ref 7–30)
CALCIUM SERPL-MCNC: 8.9 MG/DL (ref 8.5–10.1)
CHLORIDE BLD-SCNC: 103 MMOL/L (ref 94–109)
CHOLEST SERPL-MCNC: 151 MG/DL
CO2 SERPL-SCNC: 26 MMOL/L (ref 20–32)
CREAT SERPL-MCNC: 0.74 MG/DL (ref 0.52–1.04)
CREAT UR-MCNC: 285 MG/DL
FASTING STATUS PATIENT QL REPORTED: ABNORMAL
FERRITIN SERPL-MCNC: 9 NG/ML (ref 12–150)
GFR SERPL CREATININE-BSD FRML MDRD: >90 ML/MIN/1.73M2
GLUCOSE BLD-MCNC: 158 MG/DL (ref 70–99)
HBA1C MFR BLD: 7.3 % (ref 0–5.6)
HDLC SERPL-MCNC: 34 MG/DL
HOLD SPECIMEN: NORMAL
HOLD SPECIMEN: NORMAL
IRON SATN MFR SERPL: 14 % (ref 15–46)
IRON SERPL-MCNC: 64 UG/DL (ref 35–180)
LDLC SERPL CALC-MCNC: 81 MG/DL
MICROALBUMIN UR-MCNC: 23 MG/L
MICROALBUMIN/CREAT UR: 8.07 MG/G CR (ref 0–25)
NONHDLC SERPL-MCNC: 117 MG/DL
POTASSIUM BLD-SCNC: 3.5 MMOL/L (ref 3.4–5.3)
SODIUM SERPL-SCNC: 137 MMOL/L (ref 133–144)
TIBC SERPL-MCNC: 444 UG/DL (ref 240–430)
TRIGL SERPL-MCNC: 182 MG/DL

## 2022-03-21 PROCEDURE — 80061 LIPID PANEL: CPT | Performed by: NURSE PRACTITIONER

## 2022-03-21 PROCEDURE — 80048 BASIC METABOLIC PNL TOTAL CA: CPT | Performed by: NURSE PRACTITIONER

## 2022-03-21 PROCEDURE — 83036 HEMOGLOBIN GLYCOSYLATED A1C: CPT | Performed by: NURSE PRACTITIONER

## 2022-03-21 PROCEDURE — 83550 IRON BINDING TEST: CPT | Performed by: NURSE PRACTITIONER

## 2022-03-21 PROCEDURE — 99214 OFFICE O/P EST MOD 30 MIN: CPT | Performed by: NURSE PRACTITIONER

## 2022-03-21 PROCEDURE — 36415 COLL VENOUS BLD VENIPUNCTURE: CPT | Performed by: NURSE PRACTITIONER

## 2022-03-21 PROCEDURE — 82043 UR ALBUMIN QUANTITATIVE: CPT | Performed by: NURSE PRACTITIONER

## 2022-03-21 PROCEDURE — 82728 ASSAY OF FERRITIN: CPT | Performed by: NURSE PRACTITIONER

## 2022-03-21 RX ORDER — FERROUS GLUCONATE 324(38)MG
324 TABLET ORAL
Qty: 90 TABLET | Refills: 3 | Status: SHIPPED | OUTPATIENT
Start: 2022-03-21 | End: 2023-04-25

## 2022-03-21 RX ORDER — METFORMIN HCL 500 MG
1000 TABLET, EXTENDED RELEASE 24 HR ORAL 2 TIMES DAILY WITH MEALS
Qty: 360 TABLET | Refills: 1 | Status: SHIPPED | OUTPATIENT
Start: 2022-03-21 | End: 2022-06-14

## 2022-03-21 RX ORDER — SEMAGLUTIDE 1.34 MG/ML
0.5 INJECTION, SOLUTION SUBCUTANEOUS
Qty: 6 ML | Refills: 1 | Status: SHIPPED | OUTPATIENT
Start: 2022-03-21 | End: 2022-06-14

## 2022-03-21 ASSESSMENT — PAIN SCALES - GENERAL: PAINLEVEL: NO PAIN (0)

## 2022-03-21 NOTE — TELEPHONE ENCOUNTER
Patient Quality Outreach    Patient is due for the following:   Diabetes -  A1C    NEXT STEPS:   No follow up needed at this time.    Type of outreach:    Patient had office visit today.      Questions for provider review:    None     Ro Trivedi, CMA

## 2022-03-21 NOTE — PROGRESS NOTES
"  Assessment & Plan     (E11.9,  Z79.4) Type 2 diabetes mellitus without complication, with long-term current use of insulin (H)  (primary encounter diagnosis)  Comment:  Controlled no change in treatment plan  Plan: Lipid panel reflex to direct LDL Fasting,         Albumin Random Urine Quantitative with Creat         Ratio, HEMOGLOBIN A1C, OPTOMETRY REFERRAL,         BASIC METABOLIC PANEL, metFORMIN         (GLUCOPHAGE-XR) 500 MG 24 hr tablet, REVIEW OF         HEALTH MAINTENANCE PROTOCOL ORDERS, CANCELED:         Extra Tube      (E66.01) Morbid obesity (H)  Comment: reviewed diet   Plan:     (D50.9) Iron deficiency anemia, unspecified iron deficiency anemia type  Comment:  Controlled no change in treatment plan  Plan: Iron and iron binding capacity, Ferritin,         ferrous gluconate (FERGON) 324 (38 Fe) MG         tablet      (E11.9) Type 2 diabetes mellitus without complication, without long-term current use of insulin (H)  Comment:  Controlled no change in treatment plan  Plan: metFORMIN (GLUCOPHAGE-XR) 500 MG 24 hr tablet,         semaglutide (OZEMPIC, 0.25 OR 0.5 MG/DOSE,) 2         MG/1.5ML SOPN pen      (Z11.4) Screening for HIV (human immunodeficiency virus)  Comment: reviewed declined   Plan:     (Z11.59) Need for hepatitis C screening test  Comment: reviewed declined   Plan:     (Z13.220) Screening for hyperlipidemia  Comment:   Plan: Lipid panel reflex to direct LDL Fasting    BMI:   Estimated body mass index is 40.72 kg/m  as calculated from the following:    Height as of 9/23/21: 1.626 m (5' 4\").    Weight as of this encounter: 107.6 kg (237 lb 3.2 oz).   Weight management plan: Discussed healthy diet and exercise guidelines    See Patient Instructions    No follow-ups on file.    Anette Fung, PARAMJIT CNP  [facility]    Bob Espinosa is a 31 year old who presents for the following health issues     History of Present Illness       Diabetes:   She presents for follow up " of diabetes.  She is checking home blood glucose one time daily. She checks blood glucose before meals.  Blood glucose is sometimes over 200 and never under 70. When her blood glucose is low, the patient is asymptomatic for confusion, blurred vision, lethargy and reports not feeling dizzy, shaky, or weak.  She has no concerns regarding her diabetes at this time.  She is not experiencing numbness or burning in feet, excessive thirst, blurry vision, weight changes or redness, sores or blisters on feet. The patient has had a diabetic eye exam in the last 12 months.         She eats 2-3 servings of fruits and vegetables daily.She consumes 1 sweetened beverage(s) daily.She exercises with enough effort to increase her heart rate 20 to 29 minutes per day.  She exercises with enough effort to increase her heart rate 3 or less days per week.   She is taking medications regularly.         Review of Systems   Constitutional, HEENT, cardiovascular, pulmonary, gi and gu systems are negative, except as otherwise noted.      Objective    LMP 03/06/2022 (Exact Date)   Body mass index is 40.72 kg/m . /80 (BP Location: Right arm, Patient Position: Sitting, Cuff Size: Adult Large)   Pulse 80   Temp 98.9  F (37.2  C) (Tympanic)   Resp 16   Wt 107.6 kg (237 lb 3.2 oz)   LMP 03/06/2022 (Exact Date)   BMI 40.72 kg/m        Physical Exam   GENERAL: healthy, alert and no distress  EYES: Eyes grossly normal to inspection, PERRL and conjunctivae and sclerae normal  HENT: ear canals and TM's normal, nose and mouth without ulcers or lesions  NECK: no adenopathy, no asymmetry, masses, or scars and thyroid normal to palpation  RESP: lungs clear to auscultation - no rales, rhonchi or wheezes  CV: regular rate and rhythm, normal S1 S2, no S3 or S4, no murmur, click or rub, no peripheral edema and peripheral pulses strong  ABDOMEN: soft, nontender, no hepatosplenomegaly, no masses and bowel sounds normal  MS: no gross musculoskeletal  defects noted, no edema  SKIN: no suspicious lesions or rashes  NEURO: Normal strength and tone, mentation intact and speech normal  PSYCH: mentation appears normal, affect normal/bright    Results for orders placed or performed in visit on 03/21/22   Extra Tube     Status: None (In process)    Narrative    The following orders were created for panel order Extra Tube.  Procedure                               Abnormality         Status                     ---------                               -----------         ------                     Extra Red Top Tube[[id number]]                               In process                 Extra Green Top (Lithium...[[id number]]                      In process                 Extra Purple Top Tube[[id number]]                                                         Please view results for these tests on the individual orders.   HEMOGLOBIN A1C     Status: Abnormal   Result Value Ref Range    Hemoglobin A1C 7.3 (H) 0.0 - 5.6 %

## 2022-03-22 LAB — HOLD SPECIMEN: NORMAL

## 2022-03-26 ENCOUNTER — LAB (OUTPATIENT)
Dept: LAB | Facility: CLINIC | Age: 32
End: 2022-03-26
Payer: COMMERCIAL

## 2022-03-26 DIAGNOSIS — N97.9 FEMALE INFERTILITY: ICD-10-CM

## 2022-03-26 LAB — PROGEST SERPL-MCNC: 1.8 NG/ML

## 2022-03-26 PROCEDURE — 84144 ASSAY OF PROGESTERONE: CPT

## 2022-03-26 PROCEDURE — 36415 COLL VENOUS BLD VENIPUNCTURE: CPT

## 2022-03-27 DIAGNOSIS — N97.9 FEMALE INFERTILITY: Primary | ICD-10-CM

## 2022-03-27 RX ORDER — CLOMIPHENE CITRATE 50 MG/1
100 TABLET ORAL DAILY
Qty: 10 TABLET | Refills: 3 | Status: SHIPPED | OUTPATIENT
Start: 2022-03-27 | End: 2022-06-14

## 2022-04-01 ENCOUNTER — OFFICE VISIT (OUTPATIENT)
Dept: FAMILY MEDICINE | Facility: CLINIC | Age: 32
End: 2022-04-01
Payer: COMMERCIAL

## 2022-04-01 VITALS
OXYGEN SATURATION: 100 % | HEART RATE: 107 BPM | HEIGHT: 64 IN | SYSTOLIC BLOOD PRESSURE: 130 MMHG | WEIGHT: 238 LBS | BODY MASS INDEX: 40.63 KG/M2 | TEMPERATURE: 99.9 F | DIASTOLIC BLOOD PRESSURE: 84 MMHG | RESPIRATION RATE: 16 BRPM

## 2022-04-01 DIAGNOSIS — M53.3 SACRAL BACK PAIN: Primary | ICD-10-CM

## 2022-04-01 PROCEDURE — 99213 OFFICE O/P EST LOW 20 MIN: CPT | Performed by: FAMILY MEDICINE

## 2022-04-01 ASSESSMENT — PAIN SCALES - GENERAL: PAINLEVEL: NO PAIN (0)

## 2022-04-01 NOTE — PROGRESS NOTES
"  Assessment & Plan     Sacral back pain  Experiencing sacral pain for last few days, and already improving, history of pilonidal cyst.  Physical examination unremarkable, no sign of infection currently.  Reassurance provided.  Recommended to continue over-the-counter analgesia, ice/heat and follow-up if symptoms persist or worsen.  All questions answered.      Romain Banegas MD  [facility]    Bob Espinosa is a 31 year old who presents for the following health issues     HPI     Concern - cyst?   Onset: 5 days ago  Description: pain above buttocks has a history of pilonidal cyst   Therapies tried and outcome: None      Review of Systems   Constitutional, HEENT, cardiovascular, pulmonary, gi and gu systems are negative, except as otherwise noted.      Objective    /84   Pulse 107   Temp 99.9  F (37.7  C) (Tympanic)   Resp 16   Ht 1.626 m (5' 4\")   Wt 108 kg (238 lb)   LMP 03/06/2022 (Exact Date)   SpO2 100%   BMI 40.85 kg/m    Body mass index is 40.85 kg/m .  Physical Exam   GENERAL: alert and no distress  SKIN: subjective sacral pain, surgical scar C/D/I, no swelling, skin discoloration, tenderness or warmth noted  NEURO: Normal strength and tone, mentation intact and speech normal  PSYCH: mentation appears normal, affect normal/bright            "

## 2022-04-28 ENCOUNTER — LAB (OUTPATIENT)
Dept: LAB | Facility: CLINIC | Age: 32
End: 2022-04-28
Payer: COMMERCIAL

## 2022-04-28 DIAGNOSIS — N97.9 FEMALE INFERTILITY: ICD-10-CM

## 2022-04-28 DIAGNOSIS — N97.9 FEMALE INFERTILITY: Primary | ICD-10-CM

## 2022-04-28 LAB — PROGEST SERPL-MCNC: 3.2 NG/ML

## 2022-04-28 PROCEDURE — 84144 ASSAY OF PROGESTERONE: CPT

## 2022-04-28 PROCEDURE — 36415 COLL VENOUS BLD VENIPUNCTURE: CPT

## 2022-04-28 RX ORDER — CLOMIPHENE CITRATE 50 MG/1
150 TABLET ORAL DAILY
Qty: 15 TABLET | Refills: 4 | Status: SHIPPED | OUTPATIENT
Start: 2022-04-28 | End: 2022-06-14

## 2022-05-31 ENCOUNTER — LAB (OUTPATIENT)
Dept: LAB | Facility: CLINIC | Age: 32
End: 2022-05-31
Payer: COMMERCIAL

## 2022-05-31 DIAGNOSIS — N97.9 FEMALE INFERTILITY: ICD-10-CM

## 2022-05-31 LAB — PROGEST SERPL-MCNC: 0.3 NG/ML

## 2022-05-31 PROCEDURE — 84144 ASSAY OF PROGESTERONE: CPT

## 2022-05-31 PROCEDURE — 36415 COLL VENOUS BLD VENIPUNCTURE: CPT

## 2022-06-14 ENCOUNTER — MYC MEDICAL ADVICE (OUTPATIENT)
Dept: FAMILY MEDICINE | Facility: CLINIC | Age: 32
End: 2022-06-14

## 2022-06-14 ENCOUNTER — OFFICE VISIT (OUTPATIENT)
Dept: FAMILY MEDICINE | Facility: CLINIC | Age: 32
End: 2022-06-14
Payer: COMMERCIAL

## 2022-06-14 VITALS
RESPIRATION RATE: 18 BRPM | DIASTOLIC BLOOD PRESSURE: 80 MMHG | HEIGHT: 64 IN | HEART RATE: 76 BPM | SYSTOLIC BLOOD PRESSURE: 122 MMHG | TEMPERATURE: 98.7 F | BODY MASS INDEX: 40.46 KG/M2 | WEIGHT: 237 LBS

## 2022-06-14 DIAGNOSIS — Z79.4 TYPE 2 DIABETES MELLITUS WITHOUT COMPLICATION, WITH LONG-TERM CURRENT USE OF INSULIN (H): ICD-10-CM

## 2022-06-14 DIAGNOSIS — E11.9 TYPE 2 DIABETES MELLITUS WITHOUT COMPLICATION, WITHOUT LONG-TERM CURRENT USE OF INSULIN (H): Primary | ICD-10-CM

## 2022-06-14 DIAGNOSIS — E11.9 TYPE 2 DIABETES MELLITUS WITHOUT COMPLICATION, WITH LONG-TERM CURRENT USE OF INSULIN (H): ICD-10-CM

## 2022-06-14 DIAGNOSIS — E11.9 TYPE 2 DIABETES MELLITUS WITHOUT COMPLICATION, WITHOUT LONG-TERM CURRENT USE OF INSULIN (H): ICD-10-CM

## 2022-06-14 LAB — HBA1C MFR BLD: 7.9 % (ref 0–5.6)

## 2022-06-14 PROCEDURE — 36415 COLL VENOUS BLD VENIPUNCTURE: CPT | Performed by: NURSE PRACTITIONER

## 2022-06-14 PROCEDURE — 83036 HEMOGLOBIN GLYCOSYLATED A1C: CPT | Performed by: NURSE PRACTITIONER

## 2022-06-14 PROCEDURE — 99214 OFFICE O/P EST MOD 30 MIN: CPT | Performed by: NURSE PRACTITIONER

## 2022-06-14 PROCEDURE — 99207 PR FOOT EXAM NO CHARGE: CPT | Performed by: NURSE PRACTITIONER

## 2022-06-14 RX ORDER — SEMAGLUTIDE 1.34 MG/ML
1 INJECTION, SOLUTION SUBCUTANEOUS
Qty: 6 ML | Refills: 3 | Status: SHIPPED | OUTPATIENT
Start: 2022-06-14 | End: 2022-09-12

## 2022-06-14 RX ORDER — METFORMIN HCL 500 MG
1000 TABLET, EXTENDED RELEASE 24 HR ORAL 2 TIMES DAILY WITH MEALS
Qty: 360 TABLET | Refills: 1 | Status: SHIPPED | OUTPATIENT
Start: 2022-06-14 | End: 2023-04-25

## 2022-06-14 ASSESSMENT — PAIN SCALES - GENERAL: PAINLEVEL: NO PAIN (0)

## 2022-06-14 NOTE — PROGRESS NOTES
Assessment & Plan     (E11.9) Type 2 diabetes mellitus without complication, without long-term current use of insulin (H)  Comment: Uncontrolled will increase the ozempic to  1.mg every 7 days.  Follow-up in 3 months   Plan: OPTOMETRY REFERRAL, HEMOGLOBIN A1C, FOOT EXAM,         metFORMIN (GLUCOPHAGE XR) 500 MG 24 hr tablet,         semaglutide (OZEMPIC, 0.25 OR 0.5 MG/DOSE,) 2         MG/1.5ML SOPN pen    (E11.9,  Z79.4) Type 2 diabetes mellitus without complication, with long-term current use of insulin (H)  Comment: Uncontrolled will increase the ozempic to  1.mg every 7 days.  Follow-up in 3 months   Plan: metFORMIN (GLUCOPHAGE XR) 500 MG 24 hr tablet      No follow-ups on file.    PARAMJIT Martell [facility]    Bob Espinosa is a 31 year old who presents for the following health issues     History of Present Illness       Diabetes:   She presents for follow up of diabetes.  She is checking home blood glucose one time daily. She checks blood glucose before meals.  Blood glucose is never over 200 and never under 70. When her blood glucose is low, the patient is asymptomatic for confusion, blurred vision, lethargy and reports not feeling dizzy, shaky, or weak.  She has no concerns regarding her diabetes at this time.  She is not experiencing numbness or burning in feet, excessive thirst, blurry vision, weight changes or redness, sores or blisters on feet. The patient has not had a diabetic eye exam in the last 12 months.         She eats 2-3 servings of fruits and vegetables daily.She consumes 1 sweetened beverage(s) daily.She exercises with enough effort to increase her heart rate 20 to 29 minutes per day.  She exercises with enough effort to increase her heart rate 4 days per week. She is missing 2 dose(s) of medications per week.  She is not taking prescribed medications regularly due to remembering to take.             Review of Systems   Constitutional, HEENT,  "cardiovascular, pulmonary, gi and gu systems are negative, except as otherwise noted.      Objective    /80 (BP Location: Right arm, Cuff Size: Adult Large)   Pulse 76   Temp 98.7  F (37.1  C) (Tympanic)   Resp 18   Ht 1.626 m (5' 4\")   Wt 107.5 kg (237 lb)   BMI 40.68 kg/m    There is no height or weight on file to calculate BMI.  Physical Exam   GENERAL: healthy, alert and no distress  EYES: Eyes grossly normal to inspection, PERRL and conjunctivae and sclerae normal  HENT: ear canals and TM's normal, nose and mouth without ulcers or lesions  NECK: no adenopathy, no asymmetry, masses, or scars and thyroid normal to palpation  RESP: lungs clear to auscultation - no rales, rhonchi or wheezes  CV: regular rate and rhythm, normal S1 S2, no S3 or S4, no murmur, click or rub, no peripheral edema and peripheral pulses strong  ABDOMEN: soft, nontender, no hepatosplenomegaly, no masses and bowel sounds normal  MS: no gross musculoskeletal defects noted, no edema  SKIN: no suspicious lesions or rashes  NEURO: Normal strength and tone, mentation intact and speech normal  PSYCH: mentation appears normal, affect normal/bright    Results for orders placed or performed in visit on 06/14/22   HEMOGLOBIN A1C     Status: Abnormal   Result Value Ref Range    Hemoglobin A1C 7.9 (H) 0.0 - 5.6 %               "

## 2022-06-28 ENCOUNTER — TRANSFERRED RECORDS (OUTPATIENT)
Dept: FAMILY MEDICINE | Facility: CLINIC | Age: 32
End: 2022-06-28

## 2022-06-28 LAB
RETINOPATHY: NEGATIVE
RETINOPATHY: NEGATIVE

## 2022-07-03 ENCOUNTER — LAB (OUTPATIENT)
Dept: LAB | Facility: CLINIC | Age: 32
End: 2022-07-03
Payer: COMMERCIAL

## 2022-07-03 DIAGNOSIS — N97.9 FEMALE INFERTILITY: ICD-10-CM

## 2022-07-03 LAB — PROGEST SERPL-MCNC: 0.6 NG/ML

## 2022-07-03 PROCEDURE — 36415 COLL VENOUS BLD VENIPUNCTURE: CPT

## 2022-07-03 PROCEDURE — 84144 ASSAY OF PROGESTERONE: CPT

## 2022-07-05 DIAGNOSIS — N97.9 FEMALE INFERTILITY: Primary | ICD-10-CM

## 2022-07-05 RX ORDER — LETROZOLE 2.5 MG/1
5 TABLET, FILM COATED ORAL DAILY
Qty: 10 TABLET | Refills: 0 | Status: SHIPPED | OUTPATIENT
Start: 2022-07-05 | End: 2022-10-20

## 2022-07-07 DIAGNOSIS — E11.9 TYPE 2 DIABETES MELLITUS WITHOUT COMPLICATION, WITHOUT LONG-TERM CURRENT USE OF INSULIN (H): Primary | ICD-10-CM

## 2022-08-07 ENCOUNTER — LAB (OUTPATIENT)
Dept: LAB | Facility: CLINIC | Age: 32
End: 2022-08-07
Payer: COMMERCIAL

## 2022-08-07 DIAGNOSIS — N97.9 FEMALE INFERTILITY: ICD-10-CM

## 2022-08-07 LAB — PROGEST SERPL-MCNC: 6.4 NG/ML

## 2022-08-07 PROCEDURE — 84144 ASSAY OF PROGESTERONE: CPT

## 2022-08-07 PROCEDURE — 36415 COLL VENOUS BLD VENIPUNCTURE: CPT

## 2022-08-18 ENCOUNTER — MYC MEDICAL ADVICE (OUTPATIENT)
Dept: FAMILY MEDICINE | Facility: CLINIC | Age: 32
End: 2022-08-18

## 2022-08-30 ENCOUNTER — OFFICE VISIT (OUTPATIENT)
Dept: OBGYN | Facility: CLINIC | Age: 32
End: 2022-08-30
Payer: COMMERCIAL

## 2022-08-30 VITALS
TEMPERATURE: 99.6 F | BODY MASS INDEX: 39.09 KG/M2 | HEART RATE: 107 BPM | HEIGHT: 64 IN | DIASTOLIC BLOOD PRESSURE: 79 MMHG | SYSTOLIC BLOOD PRESSURE: 131 MMHG | RESPIRATION RATE: 18 BRPM | WEIGHT: 229 LBS

## 2022-08-30 DIAGNOSIS — N97.9 FEMALE INFERTILITY: Primary | ICD-10-CM

## 2022-08-30 DIAGNOSIS — R87.610 ASCUS WITH POSITIVE HIGH RISK HPV CERVICAL: ICD-10-CM

## 2022-08-30 DIAGNOSIS — E11.9 TYPE 2 DIABETES MELLITUS WITHOUT COMPLICATION, WITHOUT LONG-TERM CURRENT USE OF INSULIN (H): ICD-10-CM

## 2022-08-30 DIAGNOSIS — R87.810 ASCUS WITH POSITIVE HIGH RISK HPV CERVICAL: ICD-10-CM

## 2022-08-30 PROCEDURE — 88175 CYTOPATH C/V AUTO FLUID REDO: CPT | Performed by: OBSTETRICS & GYNECOLOGY

## 2022-08-30 PROCEDURE — 87624 HPV HI-RISK TYP POOLED RSLT: CPT | Performed by: OBSTETRICS & GYNECOLOGY

## 2022-08-30 PROCEDURE — 87109 MYCOPLASMA: CPT | Performed by: OBSTETRICS & GYNECOLOGY

## 2022-08-30 PROCEDURE — 99214 OFFICE O/P EST MOD 30 MIN: CPT | Performed by: OBSTETRICS & GYNECOLOGY

## 2022-08-30 RX ORDER — LETROZOLE 2.5 MG/1
5 TABLET, FILM COATED ORAL DAILY
Qty: 10 TABLET | Refills: 3 | Status: SHIPPED | OUTPATIENT
Start: 2022-08-30 | End: 2022-09-12

## 2022-08-30 NOTE — NURSING NOTE
"Initial /79 (BP Location: Right arm, Patient Position: Chair, Cuff Size: Adult Regular)   Pulse 107   Temp 99.6  F (37.6  C) (Tympanic)   Resp 18   Ht 1.626 m (5' 4\")   Wt 103.9 kg (229 lb)   LMP 08/17/2022   Breastfeeding No   BMI 39.31 kg/m   Estimated body mass index is 39.31 kg/m  as calculated from the following:    Height as of this encounter: 1.626 m (5' 4\").    Weight as of this encounter: 103.9 kg (229 lb). .      "

## 2022-08-30 NOTE — PROGRESS NOTES
[facility]    Gynecology Office Note    CC:   Chief Complaint   Patient presents with     Follow Up     Recheck meds and pap         HPI: Leigh Foreman is a 31 year old  who presents for infertility follow up. Patient has been undergoing ovulation induction with Dr. Fortune. History below.       Workup: HSG patent right tube (left side surgically absent)  Ureaplasma positive--pt and partner treated with Doxy  SA: essentially WNL  Day 2: E2, FSH, LH WNL; Hgb A1c improved to 8; Hgb=11.1 (MCV low c/w low iron)  Day 21: PL, TSH, AMH WNL; progesterone LOW (3.3)    HbA1c 22 7.9%     PLAN: OI with Clomid; consider future addition of insemination  Cycle #1: 3/22: Clomid 50mg day 3-7; day 21 prog=1.8  Cycle #2  Clomid 100mg day 3-7; prog=3.2  Cycle #3  Clomid 150mg day 3-7; prog=0.3  Cycle #4 : Femara 2.5mg day 3-7; prog=0.6  Cycle #5  Femara 5mg day 3-7; prog=6.4      GYN Hx:       Patient's last menstrual period was 2022.    ROS: A 10 pt ROS was completed and found to be otherwise negative unless mentioned in the HPI.     PMH:   Past Medical History:   Diagnosis Date     ASCUS with positive high risk HPV 2015    1/15/2015:Pap--ASCUS, +HR HPV. Plan [place]-      Cervical high risk HPV (human papillomavirus) test positive     see problem list     H/O colposcopy with cervical biopsy 2015    atypia     Pilonidal abscess 5/10/2010       PSHx:   Past Surgical History:   Procedure Laterality Date     CYSTECTOMY PILONIDAL  2012    Procedure:CYSTECTOMY PILONIDAL; Pilonidal cystectomy; Surgeon:PHONG STALEY; Location:[place] [facility]     INCISION AND DRAINAGE, ABSCESS, SIMPLE      pilonidal abscess     LAPAROSCOPIC SALPINGOOPHERECTOMY Left 2006    serous cystadenoma, Dr. Albert Razo       OBHx:   OB History    Para Term  AB Living   0 0 0 0 0 0   SAB IAB Ectopic Multiple Live Births   0 0 0 0 0       Medications:   blood glucose (ACCU-CHEK  GUIDE) test strip, Use to test blood sugar 1 times daily or as directed.  blood glucose monitoring (ACCU-CHEK FASTCLIX) lancets, Use to test blood sugar 1 times daily or as directed.  ferrous gluconate (FERGON) 324 (38 Fe) MG tablet, Take 1 tablet (324 mg) by mouth daily (with breakfast)  metFORMIN (GLUCOPHAGE XR) 500 MG 24 hr tablet, Take 2 tablets (1,000 mg) by mouth 2 times daily (with meals)  Semaglutide, 1 MG/DOSE, 4 MG/3ML SOPN, Inject 1 mL Subcutaneous every 7 days  letrozole (FEMARA) 2.5 MG tablet, Take 2 tablets (5 mg) by mouth daily for 5 days  letrozole (FEMARA) 2.5 MG tablet, Take 1 tablet (2.5 mg) by mouth daily (Patient not taking: Reported on 8/30/2022)  semaglutide (OZEMPIC, 0.25 OR 0.5 MG/DOSE,) 2 MG/1.5ML SOPN pen, Inject 1 mg Subcutaneous every 7 days (Patient not taking: Reported on 8/30/2022)    No current facility-administered medications on file prior to visit.      Allergies:      Allergies   Allergen Reactions     Nkda [No Known Drug Allergies]        Social History:   Social History     Socioeconomic History     Marital status:      Spouse name: Not on file     Number of children: Not on file     Years of education: Not on file     Highest education level: Not on file   Occupational History     Not on file   Tobacco Use     Smoking status: Never Smoker     Smokeless tobacco: Never Used   Vaping Use     Vaping Use: Never used   Substance and Sexual Activity     Alcohol use: No     Drug use: No     Sexual activity: Yes     Partners: Male   Other Topics Concern     Parent/sibling w/ CABG, MI or angioplasty before 65F 55M? No   Social History Narrative     Not on file     Social Determinants of Health     Financial Resource Strain: Not on file   Food Insecurity: Not on file   Transportation Needs: Not on file   Physical Activity: Not on file   Stress: Not on file   Social Connections: Not on file   Intimate Partner Violence: Not on file   Housing Stability: Not on file         Family  "History:   Family History   Problem Relation Age of Onset     Diabetes Father      Hypertension Father      Cardiovascular Maternal Grandfather         MI 42     Diabetes Paternal Grandmother      Hypertension Paternal Grandmother        Physical Exam:   Vitals:    08/30/22 1106   BP: 131/79   BP Location: Right arm   Patient Position: Chair   Cuff Size: Adult Regular   Pulse: 107   Resp: 18   Temp: 99.6  F (37.6  C)   TempSrc: Tympanic   Weight: 103.9 kg (229 lb)   Height: 1.626 m (5' 4\")      Estimated body mass index is 39.31 kg/m  as calculated from the following:    Height as of this encounter: 1.626 m (5' 4\").    Weight as of this encounter: 103.9 kg (229 lb).    General appearance: well-hydrated, A&O x 3, no apparent distress  Lungs: Equal expansion bilaterally, no accessory muscle use  Heart: No heaves or thrills.   Constitutional: See vitals  Extremities: no edema  Neuro: CN II-XII grossly intact  Genitourinary:  External genitalia: no erythema, no lesions.   Anus and Perineum: Unremarkable, no visible lesions  Vagina: Normal, healthy pink mucosa without any lesions. Physiologic vaginal discharge.   Cervix: normal appearance, no cervical motion tenderness.       Labs/Imaging: Reviewed past infertility evaluation as well as progesterone testing over past 4 OI cycles    Assessment and Plan:     Encounter Diagnoses   Name Primary?     Female infertility Yes     ASCUS with positive high risk HPV cervical      Type 2 diabetes mellitus without complication, without long-term current use of insulin (H)      Discussed need for optimization of co-morbidities in order to augment fertility treatments and decrease risks in pregnancy. Discussed the risks that diabetes and obesity pose in pregnancy. Also discussed their effect on ovulation and difficulty with ovulation induction.     Patient has had a suboptimal response to ovulation induction to this point. Again, likely contribution from medical co-morbidities. Would " recommend working over the next two months on medical optimization. Then would recommend increased surveillance to improve change of conception. Had a better response to Letrozole 5mg. Will continue this for now. Then in two months, recommend follicular ultrasound with beta hcg trigger shot. Possible increase in Letrozole dosing based on response over next few cycles at 5mg dosing. Discussed increased risk of multiples and need for monitoring with follicular ultrasounds.     Repeat ureaplasma test collected today due to history of infection, monitor for resolution.     Health maintenance: pap smear due, collected today     Keiry Mason DO    40 minutes spent on the date of the encounter doing chart review, review of test results, patient visit and documentation.

## 2022-09-01 LAB
BKR LAB AP GYN ADEQUACY: NORMAL
BKR LAB AP GYN INTERPRETATION: NORMAL
BKR LAB AP HPV REFLEX: NORMAL
BKR LAB AP PREVIOUS ABNL DX: NORMAL
BKR LAB AP PREVIOUS ABNORMAL: NORMAL
PATH REPORT.COMMENTS IMP SPEC: NORMAL
PATH REPORT.COMMENTS IMP SPEC: NORMAL
PATH REPORT.RELEVANT HX SPEC: NORMAL

## 2022-09-07 LAB — BACTERIA SPEC CULT: NORMAL

## 2022-09-12 ENCOUNTER — OFFICE VISIT (OUTPATIENT)
Dept: FAMILY MEDICINE | Facility: CLINIC | Age: 32
End: 2022-09-12
Payer: COMMERCIAL

## 2022-09-12 VITALS
WEIGHT: 228 LBS | HEIGHT: 64 IN | SYSTOLIC BLOOD PRESSURE: 126 MMHG | DIASTOLIC BLOOD PRESSURE: 70 MMHG | TEMPERATURE: 97.8 F | HEART RATE: 72 BPM | BODY MASS INDEX: 38.93 KG/M2 | RESPIRATION RATE: 18 BRPM

## 2022-09-12 DIAGNOSIS — E11.9 TYPE 2 DIABETES MELLITUS WITHOUT COMPLICATION, WITHOUT LONG-TERM CURRENT USE OF INSULIN (H): Primary | ICD-10-CM

## 2022-09-12 DIAGNOSIS — N63.0 LUMP OR MASS IN BREAST: ICD-10-CM

## 2022-09-12 LAB — HBA1C MFR BLD: 7.2 % (ref 0–5.6)

## 2022-09-12 PROCEDURE — 99214 OFFICE O/P EST MOD 30 MIN: CPT | Performed by: NURSE PRACTITIONER

## 2022-09-12 PROCEDURE — 83036 HEMOGLOBIN GLYCOSYLATED A1C: CPT | Performed by: NURSE PRACTITIONER

## 2022-09-12 PROCEDURE — 36415 COLL VENOUS BLD VENIPUNCTURE: CPT | Performed by: NURSE PRACTITIONER

## 2022-09-12 ASSESSMENT — PAIN SCALES - GENERAL: PAINLEVEL: NO PAIN (0)

## 2022-09-12 NOTE — PROGRESS NOTES
"  Assessment & Plan     (E11.9) Type 2 diabetes mellitus without complication, without long-term current use of insulin (H)  (primary encounter diagnosis)  Comment: improved no change in treatment plan  Plan: HEMOGLOBIN A1C       (N63.0) Lump or mass in breast  Comment:   Plan: MA Diagnostic Digital Bilateral, US Breast Left        Limited 1-3 Quadrants          BMI:   Estimated body mass index is 39.14 kg/m  as calculated from the following:    Height as of this encounter: 1.626 m (5' 4\").    Weight as of this encounter: 103.4 kg (228 lb).   Weight management plan: Discussed healthy diet and exercise guidelines    See Patient Instructions    No follow-ups on file.    PARAMJIT Martell CNP  [facility]    Bob Espinosa is a 31 year old, presenting for the following health issues:  No chief complaint on file.      History of Present Illness       Reason for visit:  Bump on left side, bra line    She eats 2-3 servings of fruits and vegetables daily.She consumes 1 sweetened beverage(s) daily.She exercises with enough effort to increase her heart rate 20 to 29 minutes per day.  She exercises with enough effort to increase her heart rate 4 days per week.   She is taking medications regularly.       Patient has had a bump on her left side for about two weeks. It is on her bra line. It does not hurt. She thinks that it may be changing size.         Review of Systems   Constitutional, HEENT, cardiovascular, pulmonary, gi and gu systems are negative, except as otherwise noted.      Objective    LMP 08/17/2022   There is no height or weight on file to calculate BMI.  Physical Exam   GENERAL: healthy, alert and no distress  EYES: Eyes grossly normal to inspection, PERRL and conjunctivae and sclerae normal  RESP: lungs clear to auscultation - no rales, rhonchi or wheezes  BREAST: 2 mm lump lower left breast no tenderness or nipple discharge and no palpable axillary masses or adenopathy  BREAST: "   CV: regular rate and rhythm, normal S1 S2, no S3 or S4, no murmur, click or rub, no peripheral edema and peripheral pulses strong  NEURO: Normal strength and tone, mentation intact and speech normal  PSYCH: mentation appears normal, affect normal/bright    Results for orders placed or performed in visit on 09/12/22   HEMOGLOBIN A1C     Status: Abnormal   Result Value Ref Range    Hemoglobin A1C 7.2 (H) 0.0 - 5.6 %

## 2022-10-06 ENCOUNTER — HOSPITAL ENCOUNTER (OUTPATIENT)
Dept: ULTRASOUND IMAGING | Facility: CLINIC | Age: 32
Discharge: HOME | End: 2022-10-06
Attending: NURSE PRACTITIONER
Payer: COMMERCIAL

## 2022-10-06 ENCOUNTER — HOSPITAL ENCOUNTER (OUTPATIENT)
Dept: MAMMOGRAPHY | Facility: CLINIC | Age: 32
Discharge: HOME | End: 2022-10-06
Attending: NURSE PRACTITIONER
Payer: COMMERCIAL

## 2022-10-06 DIAGNOSIS — N63.0 LUMP OR MASS IN BREAST: ICD-10-CM

## 2022-10-06 DIAGNOSIS — N63.23 LUMP IN LOWER OUTER QUADRANT OF LEFT BREAST: ICD-10-CM

## 2022-10-06 PROCEDURE — 76642 ULTRASOUND BREAST LIMITED: CPT | Mod: LT

## 2022-10-06 PROCEDURE — 77062 BREAST TOMOSYNTHESIS BI: CPT

## 2022-10-09 ENCOUNTER — LAB (OUTPATIENT)
Dept: LAB | Facility: CLINIC | Age: 32
End: 2022-10-09
Payer: COMMERCIAL

## 2022-10-09 DIAGNOSIS — N97.9 FEMALE INFERTILITY: ICD-10-CM

## 2022-10-09 LAB — PROGEST SERPL-MCNC: 10.5 NG/ML

## 2022-10-09 PROCEDURE — 84144 ASSAY OF PROGESTERONE: CPT

## 2022-10-09 PROCEDURE — 36415 COLL VENOUS BLD VENIPUNCTURE: CPT

## 2022-11-11 ENCOUNTER — LAB (OUTPATIENT)
Dept: LAB | Facility: CLINIC | Age: 32
End: 2022-11-11
Payer: COMMERCIAL

## 2022-11-11 DIAGNOSIS — N97.9 FEMALE INFERTILITY: ICD-10-CM

## 2022-11-11 LAB — PROGEST SERPL-MCNC: 9.1 NG/ML

## 2022-11-11 PROCEDURE — 84144 ASSAY OF PROGESTERONE: CPT

## 2022-11-11 PROCEDURE — 36415 COLL VENOUS BLD VENIPUNCTURE: CPT

## 2022-11-19 ENCOUNTER — HEALTH MAINTENANCE LETTER (OUTPATIENT)
Age: 32
End: 2022-11-19

## 2022-11-21 ENCOUNTER — TELEPHONE (OUTPATIENT)
Dept: OBGYN | Facility: CLINIC | Age: 32
End: 2022-11-21

## 2022-11-21 NOTE — TELEPHONE ENCOUNTER
Patient also sent [employer] message.  Will correspond with patient through [employer].    Anette Walls   Ob/Gyn Clinic  RN

## 2022-11-21 NOTE — TELEPHONE ENCOUNTER
Reason for Call:  Other call back and prescription    Detailed comments: Pt calling to see if she can get a refill on her   letrozole (FEMARA) 2.5 MG tablet she is also calling because she was told to follow up after HCG test.   Phone Number Patient can be reached at: Cell number on file:    Telephone Information:   Mobile [phone]       Best Time:       Can we leave a detailed message on this number? YES    Call taken on 11/21/2022 at 11:38 AM by Dinorah Callahan MA

## 2022-12-10 ENCOUNTER — LAB (OUTPATIENT)
Dept: LAB | Facility: CLINIC | Age: 32
End: 2022-12-10
Payer: COMMERCIAL

## 2022-12-10 DIAGNOSIS — N97.9 FEMALE INFERTILITY: ICD-10-CM

## 2022-12-10 LAB — PROGEST SERPL-MCNC: 6.5 NG/ML

## 2022-12-10 PROCEDURE — 36415 COLL VENOUS BLD VENIPUNCTURE: CPT

## 2022-12-10 PROCEDURE — 84144 ASSAY OF PROGESTERONE: CPT

## 2022-12-12 ENCOUNTER — OFFICE VISIT (OUTPATIENT)
Dept: OBGYN | Facility: CLINIC | Age: 32
End: 2022-12-12
Payer: COMMERCIAL

## 2022-12-12 VITALS
DIASTOLIC BLOOD PRESSURE: 74 MMHG | SYSTOLIC BLOOD PRESSURE: 116 MMHG | WEIGHT: 216 LBS | BODY MASS INDEX: 36.88 KG/M2 | HEIGHT: 64 IN | TEMPERATURE: 99.1 F | RESPIRATION RATE: 16 BRPM | HEART RATE: 94 BPM

## 2022-12-12 DIAGNOSIS — E66.01 MORBID OBESITY (H): ICD-10-CM

## 2022-12-12 DIAGNOSIS — N97.9 FEMALE INFERTILITY: Primary | ICD-10-CM

## 2022-12-12 DIAGNOSIS — E11.9 TYPE 2 DIABETES MELLITUS WITHOUT COMPLICATION, WITHOUT LONG-TERM CURRENT USE OF INSULIN (H): ICD-10-CM

## 2022-12-12 PROCEDURE — 99213 OFFICE O/P EST LOW 20 MIN: CPT | Performed by: OBSTETRICS & GYNECOLOGY

## 2022-12-12 RX ORDER — LETROZOLE 2.5 MG/1
5 TABLET, FILM COATED ORAL DAILY
Qty: 10 TABLET | Refills: 3 | Status: SHIPPED | OUTPATIENT
Start: 2022-12-12 | End: 2023-04-28

## 2022-12-12 NOTE — NURSING NOTE
"Initial /74 (BP Location: Left arm, Patient Position: Chair, Cuff Size: Adult Regular)   Pulse 94   Temp 99.1  F (37.3  C) (Tympanic)   Resp 16   Ht 1.626 m (5' 4\")   Wt 98 kg (216 lb)   BMI 37.08 kg/m   Estimated body mass index is 37.08 kg/m  as calculated from the following:    Height as of this encounter: 1.626 m (5' 4\").    Weight as of this encounter: 98 kg (216 lb). .      "

## 2022-12-12 NOTE — PROGRESS NOTES
[facility]    Gynecology Office Note    CC:   Chief Complaint   Patient presents with     Consult     Infertility follow up        HPI: Leigh Foreman is a 32 year old  who presents for infertility follow up. No new complaints today. Has been getting regular menses on Letrazol. Cycles 29-30 days. One cycle was 3-4 days longer, negative UPT.       Workup: HSG patent right tube (left side surgically absent)  Ureaplasma positive--pt and partner treated with Doxy  SA: essentially WNL  Day 2: E2, FSH, LH WNL; Hgb A1c improved to 8; Hgb=11.1 (MCV low c/w low iron)  Day 21: PL, TSH, AMH WNL; progesterone LOW (3.3)     HbA1c 22 7.9% -->  7.2%     PLAN: OI with Clomid; consider future addition of insemination  Cycle #1: 3/22: Clomid 50mg day 3-7; day 21 prog=1.8  Cycle #2  Clomid 100mg day 3-7; prog=3.2  Cycle #3  Clomid 150mg day 3-7; prog=0.3  Cycle #4 : Femara 2.5mg day 3-7; prog=0.6  Cycle #5  Femara 5mg day 3-7; prog=6.4  Cycle #6 10/22 Femara 5mg day 3-7; prog=10.5  Cycle #7  Femara 5mg day 3-7; prog=9.1  Cycle #8  Femara 5mg day 3-7; prog=6.5      GYN Hx:       Patient's last menstrual period was 2022.    Last Pap Smear: 22 NIL, HPV negative    ROS: A 10 pt ROS was completed and found to be otherwise negative unless mentioned in the HPI.     PMH:   Past Medical History:   Diagnosis Date     ASCUS with positive high risk HPV 2015    1/15/2015:Pap--ASCUS, +HR HPV. Plan [place]-      Cervical high risk HPV (human papillomavirus) test positive     see problem list     H/O colposcopy with cervical biopsy 2015    atypia     Pilonidal abscess 05/10/2010       PSHx:   Past Surgical History:   Procedure Laterality Date     CYSTECTOMY PILONIDAL  2012    Procedure:CYSTECTOMY PILONIDAL; Pilonidal cystectomy; Surgeon:PHONG STALEY; Location:[place] [facility]     INCISION AND DRAINAGE, ABSCESS, SIMPLE      pilonidal abscess     LAPAROSCOPIC  SALPINGOOPHERECTOMY Left 2006    serous cystadenoma, Dr. Albert Razo       OBHx:   OB History    Para Term  AB Living   0 0 0 0 0 0   SAB IAB Ectopic Multiple Live Births   0 0 0 0 0       Medications:   blood glucose (ACCU-CHEK GUIDE) test strip, Use to test blood sugar 1 times daily or as directed.  blood glucose monitoring (ACCU-CHEK FASTCLIX) lancets, Use to test blood sugar 1 times daily or as directed.  ferrous gluconate (FERGON) 324 (38 Fe) MG tablet, Take 1 tablet (324 mg) by mouth daily (with breakfast)  metFORMIN (GLUCOPHAGE XR) 500 MG 24 hr tablet, Take 2 tablets (1,000 mg) by mouth 2 times daily (with meals)  Semaglutide, 1 MG/DOSE, 4 MG/3ML SOPN, Inject 1 mL Subcutaneous every 7 days    No current facility-administered medications on file prior to visit.      Allergies:      Allergies   Allergen Reactions     Nkda [No Known Drug Allergies]        Social History:   Social History     Socioeconomic History     Marital status:      Spouse name: Not on file     Number of children: Not on file     Years of education: Not on file     Highest education level: Not on file   Occupational History     Not on file   Tobacco Use     Smoking status: Never     Smokeless tobacco: Never   Vaping Use     Vaping Use: Never used   Substance and Sexual Activity     Alcohol use: No     Drug use: No     Sexual activity: Yes     Partners: Male   Other Topics Concern     Parent/sibling w/ CABG, MI or angioplasty before 65F 55M? No   Social History Narrative     Not on file     Social Determinants of Health     Financial Resource Strain: Not on file   Food Insecurity: Not on file   Transportation Needs: Not on file   Physical Activity: Not on file   Stress: Not on file   Social Connections: Not on file   Intimate Partner Violence: Not on file   Housing Stability: Not on file         Family History:   Family History   Problem Relation Age of Onset     Diabetes Father      Hypertension Father       "Cardiovascular Maternal Grandfather         MI 42     Diabetes Paternal Grandmother      Hypertension Paternal Grandmother        Physical Exam:   Vitals:    12/12/22 1333   BP: 116/74   BP Location: Left arm   Patient Position: Chair   Cuff Size: Adult Regular   Pulse: 94   Resp: 16   Temp: 99.1  F (37.3  C)   TempSrc: Tympanic   Weight: 98 kg (216 lb)   Height: 1.626 m (5' 4\")      Estimated body mass index is 37.08 kg/m  as calculated from the following:    Height as of this encounter: 1.626 m (5' 4\").    Weight as of this encounter: 98 kg (216 lb).    General appearance: well-hydrated, A&O x 3, no apparent distress  Lungs: Equal expansion bilaterally, no accessory muscle use  Heart: No heaves or thrills.   Constitutional: See vitals  Extremities: no edema  Neuro: CN II-XII grossly intact      Labs/Imaging: HbA1c 7.2% 9/12/22, progesterones reviewed as above    Assessment and Plan:     Encounter Diagnoses   Name Primary?     Female infertility Yes     Type 2 diabetes mellitus without complication, without long-term current use of insulin (H)      Morbid obesity (H)      Patient has been working on losing weight and better diabetic control and seeing improvements in both areas, encouragement provided. BMI now less than 40. HbA1c down to 7.2%.     Response to Letrozole 5mg has been better but still some borderline progesterone levels and no positive UPTs. Discussed options for moving forward including BLAIR referral, monitored cycles, IUI. Would like to start with monitored cycles. Plan for follicular US around day 12 next cycle. Would like to defer IUI for now. Plan to add if still no success with three monitored cycles.      Keiry Mason, DO        "

## 2022-12-18 ENCOUNTER — HEALTH MAINTENANCE LETTER (OUTPATIENT)
Age: 32
End: 2022-12-18

## 2022-12-21 DIAGNOSIS — E11.9 DIABETES MELLITUS, TYPE 2 (H): Primary | ICD-10-CM

## 2022-12-23 ENCOUNTER — LAB (OUTPATIENT)
Dept: LAB | Facility: CLINIC | Age: 32
End: 2022-12-23
Payer: COMMERCIAL

## 2022-12-23 DIAGNOSIS — E11.9 DIABETES MELLITUS, TYPE 2 (H): ICD-10-CM

## 2022-12-23 LAB — HBA1C MFR BLD: 6.5 % (ref 0–5.6)

## 2022-12-23 PROCEDURE — 83036 HEMOGLOBIN GLYCOSYLATED A1C: CPT

## 2022-12-23 PROCEDURE — 36415 COLL VENOUS BLD VENIPUNCTURE: CPT

## 2023-01-03 ENCOUNTER — OFFICE VISIT (OUTPATIENT)
Dept: OBGYN | Facility: CLINIC | Age: 33
End: 2023-01-03
Payer: COMMERCIAL

## 2023-01-03 VITALS
HEART RATE: 88 BPM | HEIGHT: 64 IN | DIASTOLIC BLOOD PRESSURE: 78 MMHG | BODY MASS INDEX: 36.76 KG/M2 | WEIGHT: 215.3 LBS | TEMPERATURE: 97.9 F | SYSTOLIC BLOOD PRESSURE: 122 MMHG | RESPIRATION RATE: 12 BRPM

## 2023-01-03 DIAGNOSIS — N97.9 FEMALE INFERTILITY: Primary | ICD-10-CM

## 2023-01-03 PROCEDURE — 76830 TRANSVAGINAL US NON-OB: CPT | Performed by: OBSTETRICS & GYNECOLOGY

## 2023-01-03 PROCEDURE — 99212 OFFICE O/P EST SF 10 MIN: CPT | Mod: 25 | Performed by: OBSTETRICS & GYNECOLOGY

## 2023-01-03 RX ORDER — CHORIONIC GONADOTROPIN 10000 UNIT
10000 KIT INTRAMUSCULAR ONCE
Status: COMPLETED | OUTPATIENT
Start: 2023-01-03 | End: 2023-01-05

## 2023-01-03 NOTE — NURSING NOTE
"Initial /78 (BP Location: Right arm, Patient Position: Sitting, Cuff Size: Adult Large)   Pulse 88   Temp 97.9  F (36.6  C) (Tympanic)   Resp 12   Ht 1.626 m (5' 4\")   Wt 97.7 kg (215 lb 4.8 oz)   LMP 12/23/2022   BMI 36.96 kg/m   Estimated body mass index is 36.96 kg/m  as calculated from the following:    Height as of this encounter: 1.626 m (5' 4\").    Weight as of this encounter: 97.7 kg (215 lb 4.8 oz). .      "

## 2023-01-03 NOTE — PROGRESS NOTES
Follicular US    CD#12    Cycle #1 with 5mg letrazole and metformin    Transvaginal US: mid-position uterus with 7.55mm EMS, right ovary appears polycystic with multiple peripheral follicles and enlarged ovary. There is a single dominant follicle measuring [id number]. Left ovary is surgically absent.       A/P: 31 yo with primary infertility, PCOS/anovulation.   Plan B-HCG trigger injection 1/5 with timed intercourse at home.     The chorionic gonadotropin injection is medically necessary as this patient has failed conservative infertility treatments for PCOS/anovulation and needs monitored cycles. She will have a mature follicle on 1/5, so this injection is time sensitive.     Plan cycle day #21 progesterone.     Norma Higginbotham MD  OB/GYN

## 2023-01-05 ENCOUNTER — ALLIED HEALTH/NURSE VISIT (OUTPATIENT)
Dept: OBGYN | Facility: CLINIC | Age: 33
End: 2023-01-05
Payer: COMMERCIAL

## 2023-01-05 DIAGNOSIS — N97.9 FEMALE INFERTILITY: Primary | ICD-10-CM

## 2023-01-05 PROCEDURE — 96372 THER/PROPH/DIAG INJ SC/IM: CPT | Performed by: OBSTETRICS & GYNECOLOGY

## 2023-01-05 PROCEDURE — 99207 PR NO CHARGE NURSE ONLY: CPT

## 2023-01-05 RX ADMIN — CHORIONIC GONADOTROPIN 10000 UNITS: KIT at 09:04

## 2023-01-05 NOTE — PROGRESS NOTES
Clinic Administered Medication Documentation    Administrations This Visit     chorionic gonadotropin (NOVAREL/PREGNYL) injection 10,000 Units     Admin Date  01/05/2023 Action  Given Dose  10,000 Units Route  Intramuscular Site  Right Gluteus Nguyễn Administered By  Nya Kunz    Ordering Provider: Norma Higginbotham MD    NDC: 1417-2458-26    Lot#: [id number]    : [employer]    Patient Supplied?: No                  Injectable Medication Documentation    Patient was given Pregnyl (chorionic gondotropin for injection USP) 10,000 USP units. Prior to medication administration, verified patients identity using patient s name and date of birth. Please see MAR and medication order for additional information. Patient instructed to remain in clinic for 15 minutes and report any adverse reaction to staff immediately .      Was entire vial of medication used? Yes  Vial/Syringe: Single dose vial  Expiration Date:  03/25/2024  Was this medication supplied by the patient? No   Nya Kunz CMA

## 2023-01-13 ENCOUNTER — LAB (OUTPATIENT)
Dept: LAB | Facility: CLINIC | Age: 33
End: 2023-01-13
Payer: COMMERCIAL

## 2023-01-13 DIAGNOSIS — N97.9 FEMALE INFERTILITY: ICD-10-CM

## 2023-01-13 LAB — PROGEST SERPL-MCNC: 6.2 NG/ML

## 2023-01-13 PROCEDURE — 84144 ASSAY OF PROGESTERONE: CPT

## 2023-01-13 PROCEDURE — 36415 COLL VENOUS BLD VENIPUNCTURE: CPT

## 2023-02-05 DIAGNOSIS — E11.9 TYPE 2 DIABETES MELLITUS WITHOUT COMPLICATION, WITHOUT LONG-TERM CURRENT USE OF INSULIN (H): ICD-10-CM

## 2023-02-07 DIAGNOSIS — E11.9 TYPE 2 DIABETES MELLITUS WITHOUT COMPLICATION, WITHOUT LONG-TERM CURRENT USE OF INSULIN (H): ICD-10-CM

## 2023-02-07 RX ORDER — SEMAGLUTIDE 1.34 MG/ML
INJECTION, SOLUTION SUBCUTANEOUS
Qty: 12 ML | Refills: 0 | Status: SHIPPED | OUTPATIENT
Start: 2023-02-07 | End: 2023-02-07

## 2023-02-07 NOTE — TELEPHONE ENCOUNTER
"Message from Pharmacy:      \"SIG: inject 1 mL subcutaneous every 7 days. Should it change to 1 MG?\"    Please correct and resubmit    Preferred Pharmacy:    [id number] - [place], [place] - [address]  [address] [address]  Phone: [phone] Fax: [phone]      Could we send this information to you in Testt or would you prefer to receive a phone call?:   Patient would prefer a phone call   Okay to leave a detailed message?: Yes at Home number on file [phone] (home)          "

## 2023-02-08 RX ORDER — SEMAGLUTIDE 1.34 MG/ML
1 INJECTION, SOLUTION SUBCUTANEOUS
Qty: 12 ML | Refills: 0 | Status: SHIPPED | OUTPATIENT
Start: 2023-02-08 | End: 2023-04-25

## 2023-03-06 ENCOUNTER — OFFICE VISIT (OUTPATIENT)
Dept: OBGYN | Facility: CLINIC | Age: 33
End: 2023-03-06
Payer: COMMERCIAL

## 2023-03-06 VITALS
SYSTOLIC BLOOD PRESSURE: 113 MMHG | BODY MASS INDEX: 36.62 KG/M2 | RESPIRATION RATE: 16 BRPM | WEIGHT: 214.5 LBS | HEIGHT: 64 IN | TEMPERATURE: 98.4 F | HEART RATE: 91 BPM | DIASTOLIC BLOOD PRESSURE: 77 MMHG

## 2023-03-06 DIAGNOSIS — N97.0 INFERTILITY, ANOVULATION: Primary | ICD-10-CM

## 2023-03-06 PROCEDURE — 76830 TRANSVAGINAL US NON-OB: CPT | Performed by: OBSTETRICS & GYNECOLOGY

## 2023-03-06 PROCEDURE — 99212 OFFICE O/P EST SF 10 MIN: CPT | Mod: 25 | Performed by: OBSTETRICS & GYNECOLOGY

## 2023-03-06 NOTE — PROGRESS NOTES
[facility]    Follicular Ultrasound Note    Leigh Foreman  1990  [id number]    Indications:    Leigh Foreman is a 32 year old year old female, who is here for follicular ultrasound. She is on cycle day #11. Ovulation induction with Letrozole 5mg.       Bedside Ultrasound:    Transvaginal ultrasound was performed. Uterus is anteverted. Endometrium measuring 6.7mm. Left ovary surgically absent. Right ovary with multiple small follicles measuring 11-12mm (4 measured today). No dominant follicle.      Plan:    Uncertain if no dominant follicle this cycle, showing in adequate response, or if too early in cycle. Does endorse 30-31 day cycles.     Follow up: repeat US in 4 days  Also discussed possibility of timed intercourse and progesterone level without repeat US but she would like monitoring.       Keiry Mason, DO

## 2023-03-06 NOTE — PROGRESS NOTES
"Initial /77 (BP Location: Left arm, Patient Position: Chair, Cuff Size: Adult Regular)   Pulse 91   Temp 98.4  F (36.9  C) (Tympanic)   Resp 16   Ht 1.626 m (5' 4\")   Wt 97.3 kg (214 lb 8 oz)   LMP 02/24/2023   BMI 36.82 kg/m   Estimated body mass index is 36.82 kg/m  as calculated from the following:    Height as of this encounter: 1.626 m (5' 4\").    Weight as of this encounter: 97.3 kg (214 lb 8 oz). .      "

## 2023-03-10 ENCOUNTER — APPOINTMENT (OUTPATIENT)
Dept: OBGYN | Facility: CLINIC | Age: 33
End: 2023-03-10
Payer: COMMERCIAL

## 2023-03-10 ENCOUNTER — OFFICE VISIT (OUTPATIENT)
Dept: OBGYN | Facility: CLINIC | Age: 33
End: 2023-03-10
Payer: COMMERCIAL

## 2023-03-10 VITALS
HEART RATE: 94 BPM | BODY MASS INDEX: 36.58 KG/M2 | HEIGHT: 64 IN | DIASTOLIC BLOOD PRESSURE: 78 MMHG | WEIGHT: 214.3 LBS | SYSTOLIC BLOOD PRESSURE: 118 MMHG | TEMPERATURE: 98.5 F

## 2023-03-10 DIAGNOSIS — N97.0 ANOVULATION: Primary | ICD-10-CM

## 2023-03-10 DIAGNOSIS — N97.9 FEMALE INFERTILITY: ICD-10-CM

## 2023-03-10 PROCEDURE — 76830 TRANSVAGINAL US NON-OB: CPT | Performed by: OBSTETRICS & GYNECOLOGY

## 2023-03-10 PROCEDURE — 99212 OFFICE O/P EST SF 10 MIN: CPT | Mod: 25 | Performed by: OBSTETRICS & GYNECOLOGY

## 2023-03-10 PROCEDURE — 96372 THER/PROPH/DIAG INJ SC/IM: CPT | Performed by: OBSTETRICS & GYNECOLOGY

## 2023-03-10 RX ORDER — CHORIONIC GONADOTROPIN 10000 UNIT
10000 KIT INTRAMUSCULAR ONCE
Status: COMPLETED | OUTPATIENT
Start: 2023-03-10 | End: 2023-03-10

## 2023-03-10 RX ADMIN — CHORIONIC GONADOTROPIN 10000 UNITS: KIT at 14:55

## 2023-03-10 NOTE — PROGRESS NOTES
[facility]    Follicular Ultrasound Note    Leigh Foreman  1990  [id number]    Indications:    Leigh Foreman is a 32 year old year old female, who is here for follicular ultrasound. She is on cycle day #15. Ovulation induction with Letrozole 5mg.       Bedside Ultrasound:    Transvaginal ultrasound was performed. Uterus is anteverted. Endometrium measuring 9.9mm. Left ovary surgically absent. Right ovary with dominant follicle measuring 22mm x 18mm. Second follicle measuring 16mm. No free fluid in the pelvis.    Plan:    Plan trigger shot today. Discussed small risk of double ovulation with second 16mm follicle. Patient accepts risk of multiples.    Chorionic gonadotropin is the standard of care for failed conservative measures of ovulation induction with timed intercourse. Therefore, this is a medically-indicated medication.     Follow up: Progesterone level in 7 days. UTP in 14 days.      Keiry Mason DO

## 2023-03-10 NOTE — NURSING NOTE
"Initial /78 (BP Location: Right arm, Patient Position: Sitting, Cuff Size: Adult Large)   Pulse 94   Temp 98.5  F (36.9  C) (Tympanic)   Ht 1.626 m (5' 4\")   Wt 97.2 kg (214 lb 4.8 oz)   LMP 02/24/2023   BMI 36.78 kg/m   Estimated body mass index is 36.78 kg/m  as calculated from the following:    Height as of this encounter: 1.626 m (5' 4\").    Weight as of this encounter: 97.2 kg (214 lb 4.8 oz). .      "

## 2023-03-17 ENCOUNTER — LAB (OUTPATIENT)
Dept: LAB | Facility: CLINIC | Age: 33
End: 2023-03-17
Payer: COMMERCIAL

## 2023-03-17 DIAGNOSIS — N97.0 ANOVULATION: ICD-10-CM

## 2023-03-17 LAB — PROGEST SERPL-MCNC: 20 NG/ML

## 2023-03-17 PROCEDURE — 84144 ASSAY OF PROGESTERONE: CPT

## 2023-03-17 PROCEDURE — 36415 COLL VENOUS BLD VENIPUNCTURE: CPT

## 2023-03-29 DIAGNOSIS — N97.9 FEMALE INFERTILITY: Primary | ICD-10-CM

## 2023-03-29 NOTE — PROGRESS NOTES
She needs a follicular ultrasound on cycle day #14 or 15 (4/9 or 4/10). Dr. Higginbotham and I are both out of the office on the 10th, so I ordered an ultrasound to be done in the radiology department. I would tentatively plan for her and her partner to come in on the 11th for the IUI, assuming she makes a follicle and can be triggered on the 10th, which is how she responded last cycle.    Keiry Mason, DO

## 2023-04-09 ENCOUNTER — HEALTH MAINTENANCE LETTER (OUTPATIENT)
Age: 33
End: 2023-04-09

## 2023-04-10 ENCOUNTER — HOSPITAL ENCOUNTER (OUTPATIENT)
Dept: ULTRASOUND IMAGING | Facility: CLINIC | Age: 33
Discharge: HOME | End: 2023-04-10
Attending: OBSTETRICS & GYNECOLOGY | Admitting: OBSTETRICS & GYNECOLOGY
Payer: COMMERCIAL

## 2023-04-10 ENCOUNTER — TELEPHONE (OUTPATIENT)
Dept: OBGYN | Facility: CLINIC | Age: 33
End: 2023-04-10

## 2023-04-10 ENCOUNTER — ALLIED HEALTH/NURSE VISIT (OUTPATIENT)
Dept: OBGYN | Facility: CLINIC | Age: 33
End: 2023-04-10
Payer: COMMERCIAL

## 2023-04-10 VITALS
TEMPERATURE: 98.1 F | SYSTOLIC BLOOD PRESSURE: 123 MMHG | DIASTOLIC BLOOD PRESSURE: 76 MMHG | BODY MASS INDEX: 36.9 KG/M2 | WEIGHT: 215 LBS | HEART RATE: 81 BPM

## 2023-04-10 DIAGNOSIS — N97.9 FEMALE INFERTILITY: ICD-10-CM

## 2023-04-10 DIAGNOSIS — N97.9 FEMALE INFERTILITY: Primary | ICD-10-CM

## 2023-04-10 PROCEDURE — 96372 THER/PROPH/DIAG INJ SC/IM: CPT | Performed by: OBSTETRICS & GYNECOLOGY

## 2023-04-10 PROCEDURE — 99207 PR NO CHARGE NURSE ONLY: CPT

## 2023-04-10 PROCEDURE — 76830 TRANSVAGINAL US NON-OB: CPT

## 2023-04-10 RX ORDER — CHORIONIC GONADOTROPIN 10000 UNIT
10000 KIT INTRAMUSCULAR ONCE
Status: COMPLETED | OUTPATIENT
Start: 2023-04-10 | End: 2023-04-10

## 2023-04-10 RX ADMIN — CHORIONIC GONADOTROPIN 10000 UNITS: KIT at 13:26

## 2023-04-10 NOTE — PROGRESS NOTES
Clinic Administered Medication Documentation        Patient was given chorionic gonadotropin 10,000 Units/Ml. Prior to medication administration, verified patient's identity using patient s name and date of birth. Please see MAR and medication order for additional information. Patient instructed to remain in clinic for 15 minutes, report any adverse reaction to staff immediately and remain in clinic for 15 minutes and report any adverse reaction to staff immediately but patient declined.    Vial/Syringe: Single dose vial. Was entire vial of medication used? Yes     Given Right ventrogluteal    Starla Gonzalez MA

## 2023-04-10 NOTE — NURSING NOTE
"Initial /76 (BP Location: Right arm, Patient Position: Chair, Cuff Size: Adult Large)   Pulse 81   Temp 98.1  F (36.7  C) (Tympanic)   Wt 97.5 kg (215 lb)   LMP 02/24/2023 (Exact Date)   BMI 36.90 kg/m   Estimated body mass index is 36.9 kg/m  as calculated from the following:    Height as of 3/10/23: 1.626 m (5' 4\").    Weight as of this encounter: 97.5 kg (215 lb). .    Starla Gonzalez MA    "

## 2023-04-11 ENCOUNTER — OFFICE VISIT (OUTPATIENT)
Dept: OBGYN | Facility: CLINIC | Age: 33
End: 2023-04-11
Payer: COMMERCIAL

## 2023-04-11 ENCOUNTER — ALLIED HEALTH/NURSE VISIT (OUTPATIENT)
Dept: OBGYN | Facility: CLINIC | Age: 33
End: 2023-04-11
Payer: COMMERCIAL

## 2023-04-11 VITALS
WEIGHT: 216.3 LBS | HEART RATE: 99 BPM | TEMPERATURE: 98.9 F | RESPIRATION RATE: 16 BRPM | BODY MASS INDEX: 36.93 KG/M2 | SYSTOLIC BLOOD PRESSURE: 124 MMHG | DIASTOLIC BLOOD PRESSURE: 68 MMHG | HEIGHT: 64 IN

## 2023-04-11 DIAGNOSIS — N97.9 FEMALE INFERTILITY: Primary | ICD-10-CM

## 2023-04-11 PROCEDURE — 99207 PR NO CHARGE NURSE ONLY: CPT

## 2023-04-11 PROCEDURE — 58322 ARTIFICIAL INSEMINATION: CPT | Performed by: OBSTETRICS & GYNECOLOGY

## 2023-04-11 NOTE — PROGRESS NOTES
[facility]    IUI Procedure Note    Leigh Foreman  1990  [id number]    Indications:    Leigh Foreman is a 32 year old year old female, who is here for IUI. Informed consent was singed after a discussion of the risks of intrauterine insemination (bleeding, infection, transmission of STIs, uterine cramping). She received a trigger shot yesterday. Follicular ultrasound showed two mature follicles. Again addressed risk of multiple gestation. She would like to proceed with procedure today.      Procedure:   Semen specimen was prepared per protocol. The patient was placed in dorsal lithotomy position. A speculum was placed in the vagina and the cervix visualized. A specimen catheter labeled with the patient's information was placed through the cervix and the sample was injected. The patient tolerated the procedure well. There were no complications and no blood loss. The patient remained in our clinic with elevated pelvis for 20 min after the insemination. Plan for day #21 progesterone. Plan for pregnancy test with missed menses.       Keiry Mason DO

## 2023-04-17 ENCOUNTER — LAB (OUTPATIENT)
Dept: LAB | Facility: CLINIC | Age: 33
End: 2023-04-17
Payer: COMMERCIAL

## 2023-04-17 DIAGNOSIS — N97.0 ANOVULATION: ICD-10-CM

## 2023-04-17 LAB — PROGEST SERPL-MCNC: 12.8 NG/ML

## 2023-04-17 PROCEDURE — 36415 COLL VENOUS BLD VENIPUNCTURE: CPT

## 2023-04-17 PROCEDURE — 84144 ASSAY OF PROGESTERONE: CPT

## 2023-04-24 ASSESSMENT — ENCOUNTER SYMPTOMS
NAUSEA: 0
FREQUENCY: 0
ABDOMINAL PAIN: 0
COUGH: 0
HEADACHES: 0
JOINT SWELLING: 0
FEVER: 0
ARTHRALGIAS: 0
CHILLS: 0
WEAKNESS: 0
HEARTBURN: 0
BREAST MASS: 0
PALPITATIONS: 0
DIZZINESS: 0
EYE PAIN: 0
HEMATOCHEZIA: 0
DIARRHEA: 0
PARESTHESIAS: 0
HEMATURIA: 0
CONSTIPATION: 0
DYSURIA: 0
SORE THROAT: 0
NERVOUS/ANXIOUS: 0
MYALGIAS: 0
SHORTNESS OF BREATH: 0

## 2023-04-25 ENCOUNTER — OFFICE VISIT (OUTPATIENT)
Dept: FAMILY MEDICINE | Facility: CLINIC | Age: 33
End: 2023-04-25
Payer: COMMERCIAL

## 2023-04-25 VITALS
OXYGEN SATURATION: 100 % | SYSTOLIC BLOOD PRESSURE: 118 MMHG | HEART RATE: 88 BPM | TEMPERATURE: 97.7 F | DIASTOLIC BLOOD PRESSURE: 70 MMHG | RESPIRATION RATE: 18 BRPM | BODY MASS INDEX: 36.54 KG/M2 | WEIGHT: 214 LBS | HEIGHT: 64 IN

## 2023-04-25 DIAGNOSIS — E11.9 TYPE 2 DIABETES MELLITUS WITHOUT COMPLICATION, WITHOUT LONG-TERM CURRENT USE OF INSULIN (H): ICD-10-CM

## 2023-04-25 DIAGNOSIS — E66.01 MORBID OBESITY (H): ICD-10-CM

## 2023-04-25 DIAGNOSIS — E11.9 TYPE 2 DIABETES MELLITUS WITHOUT COMPLICATION, WITH LONG-TERM CURRENT USE OF INSULIN (H): ICD-10-CM

## 2023-04-25 DIAGNOSIS — Z79.4 TYPE 2 DIABETES MELLITUS WITHOUT COMPLICATION, WITH LONG-TERM CURRENT USE OF INSULIN (H): ICD-10-CM

## 2023-04-25 DIAGNOSIS — E11.9 TYPE 2 DIABETES MELLITUS WITHOUT COMPLICATION, UNSPECIFIED WHETHER LONG TERM INSULIN USE (H): ICD-10-CM

## 2023-04-25 DIAGNOSIS — C56.2 MALIGNANT NEOPLASM OF LEFT OVARY (H): ICD-10-CM

## 2023-04-25 DIAGNOSIS — Z00.00 ANNUAL PHYSICAL EXAM: Primary | ICD-10-CM

## 2023-04-25 DIAGNOSIS — D50.8 IRON DEFICIENCY ANEMIA SECONDARY TO INADEQUATE DIETARY IRON INTAKE: ICD-10-CM

## 2023-04-25 LAB
ANION GAP SERPL CALCULATED.3IONS-SCNC: 10 MMOL/L (ref 7–15)
BUN SERPL-MCNC: 8.5 MG/DL (ref 6–20)
CALCIUM SERPL-MCNC: 8.9 MG/DL (ref 8.6–10)
CHLORIDE SERPL-SCNC: 106 MMOL/L (ref 98–107)
CHOLEST SERPL-MCNC: 122 MG/DL
CREAT SERPL-MCNC: 0.66 MG/DL (ref 0.51–0.95)
CREAT UR-MCNC: 368.5 MG/DL
DEPRECATED HCO3 PLAS-SCNC: 24 MMOL/L (ref 22–29)
ERYTHROCYTE [DISTWIDTH] IN BLOOD BY AUTOMATED COUNT: 16.5 % (ref 10–15)
FERRITIN SERPL-MCNC: 5 NG/ML (ref 6–175)
GFR SERPL CREATININE-BSD FRML MDRD: >90 ML/MIN/1.73M2
GLUCOSE SERPL-MCNC: 172 MG/DL (ref 70–99)
HBA1C MFR BLD: 6.6 % (ref 0–5.6)
HCT VFR BLD AUTO: 30.6 % (ref 35–47)
HDLC SERPL-MCNC: 41 MG/DL
HGB BLD-MCNC: 9.2 G/DL (ref 11.7–15.7)
IRON BINDING CAPACITY (ROCHE): 430 UG/DL (ref 240–430)
IRON SATN MFR SERPL: 6 % (ref 15–46)
IRON SERPL-MCNC: 25 UG/DL (ref 37–145)
LDLC SERPL CALC-MCNC: 66 MG/DL
MCH RBC QN AUTO: 19.5 PG (ref 26.5–33)
MCHC RBC AUTO-ENTMCNC: 30.1 G/DL (ref 31.5–36.5)
MCV RBC AUTO: 65 FL (ref 78–100)
MICROALBUMIN UR-MCNC: 85.2 MG/L
MICROALBUMIN/CREAT UR: 23.12 MG/G CR (ref 0–25)
NONHDLC SERPL-MCNC: 81 MG/DL
PLATELET # BLD AUTO: 367 10E3/UL (ref 150–450)
POTASSIUM SERPL-SCNC: 4.3 MMOL/L (ref 3.4–5.3)
RBC # BLD AUTO: 4.72 10E6/UL (ref 3.8–5.2)
SODIUM SERPL-SCNC: 140 MMOL/L (ref 136–145)
TRIGL SERPL-MCNC: 75 MG/DL
WBC # BLD AUTO: 7.6 10E3/UL (ref 4–11)

## 2023-04-25 PROCEDURE — 82043 UR ALBUMIN QUANTITATIVE: CPT | Performed by: NURSE PRACTITIONER

## 2023-04-25 PROCEDURE — 99395 PREV VISIT EST AGE 18-39: CPT | Performed by: NURSE PRACTITIONER

## 2023-04-25 PROCEDURE — 82728 ASSAY OF FERRITIN: CPT | Performed by: NURSE PRACTITIONER

## 2023-04-25 PROCEDURE — 80061 LIPID PANEL: CPT | Performed by: NURSE PRACTITIONER

## 2023-04-25 PROCEDURE — 80048 BASIC METABOLIC PNL TOTAL CA: CPT | Performed by: NURSE PRACTITIONER

## 2023-04-25 PROCEDURE — 82570 ASSAY OF URINE CREATININE: CPT | Performed by: NURSE PRACTITIONER

## 2023-04-25 PROCEDURE — 99213 OFFICE O/P EST LOW 20 MIN: CPT | Mod: 25 | Performed by: NURSE PRACTITIONER

## 2023-04-25 PROCEDURE — 36415 COLL VENOUS BLD VENIPUNCTURE: CPT | Performed by: NURSE PRACTITIONER

## 2023-04-25 PROCEDURE — 83540 ASSAY OF IRON: CPT | Performed by: NURSE PRACTITIONER

## 2023-04-25 PROCEDURE — 85027 COMPLETE CBC AUTOMATED: CPT | Performed by: NURSE PRACTITIONER

## 2023-04-25 PROCEDURE — 83550 IRON BINDING TEST: CPT | Performed by: NURSE PRACTITIONER

## 2023-04-25 PROCEDURE — 83036 HEMOGLOBIN GLYCOSYLATED A1C: CPT | Performed by: NURSE PRACTITIONER

## 2023-04-25 RX ORDER — SEMAGLUTIDE 1.34 MG/ML
1 INJECTION, SOLUTION SUBCUTANEOUS
Qty: 12 ML | Refills: 0 | Status: SHIPPED | OUTPATIENT
Start: 2023-04-25 | End: 2023-10-30

## 2023-04-25 RX ORDER — METFORMIN HCL 500 MG
1000 TABLET, EXTENDED RELEASE 24 HR ORAL 2 TIMES DAILY WITH MEALS
Qty: 360 TABLET | Refills: 1 | Status: SHIPPED | OUTPATIENT
Start: 2023-04-25 | End: 2023-10-30

## 2023-04-25 ASSESSMENT — PAIN SCALES - GENERAL: PAINLEVEL: NO PAIN (0)

## 2023-04-25 ASSESSMENT — ENCOUNTER SYMPTOMS
BREAST MASS: 0
SHORTNESS OF BREATH: 0
PARESTHESIAS: 0
HEMATURIA: 0
MYALGIAS: 0
SORE THROAT: 0
DYSURIA: 0
HEARTBURN: 0
COUGH: 0
FEVER: 0
DIARRHEA: 0
PALPITATIONS: 0
NAUSEA: 0
FREQUENCY: 0
HEADACHES: 0
ARTHRALGIAS: 0
WEAKNESS: 0
JOINT SWELLING: 0
ABDOMINAL PAIN: 0
NERVOUS/ANXIOUS: 0
CHILLS: 0
HEMATOCHEZIA: 0
DIZZINESS: 0
EYE PAIN: 0
CONSTIPATION: 0

## 2023-04-25 NOTE — PROGRESS NOTES
SUBJECTIVE:   CC: Francisca is an 32 year old who presents for preventive health visit.       4/25/2023     7:09 AM   Additional Questions   Roomed by [facility]   Patient has been advised of split billing requirements and indicates understanding: Yes  Healthy Habits:     Getting at least 3 servings of Calcium per day:  Yes    Bi-annual eye exam:  Yes    Dental care twice a year:  Yes    Sleep apnea or symptoms of sleep apnea:  None    Diet:  Diabetic and Carbohydrate counting    Frequency of exercise:  2-3 days/week    Duration of exercise:  30-45 minutes    Taking medications regularly:  Yes    Medication side effects:  None    PHQ-2 Total Score: 0    Additional concerns today:  No      Today's PHQ-2 Score:       4/24/2023     9:59 AM   PHQ-2 ( 1999 [employer])   Q1: Little interest or pleasure in doing things 0   Q2: Feeling down, depressed or hopeless 0   PHQ-2 Score 0   Q1: Little interest or pleasure in doing things Not at all   Q2: Feeling down, depressed or hopeless Not at all   PHQ-2 Score 0       Have you ever done Advance Care Planning? (For example, a Health Directive, POLST, or a discussion with a medical provider or your loved ones about your wishes): No, advance care planning information given to patient to review.  Patient declined advance care planning discussion at this time.    Social History     Tobacco Use     Smoking status: Never     Smokeless tobacco: Never   Vaping Use     Vaping status: Never Used   Substance Use Topics     Alcohol use: No             4/24/2023     9:58 AM   Alcohol Use   Prescreen: >3 drinks/day or >7 drinks/week? No     Reviewed orders with patient.  Reviewed health maintenance and updated orders accordingly - Yes  BP Readings from Last 3 Encounters:   04/25/23 118/70   04/11/23 124/68   04/10/23 123/76    Wt Readings from Last 3 Encounters:   04/25/23 97.1 kg (214 lb)   04/11/23 98.1 kg (216 lb 4.8 oz)   04/10/23 97.5 kg (215 lb)                  Patient Active Problem List    Diagnosis     Left ovarian serous cystadenoma     ACNE     CARDIOVASCULAR SCREENING; LDL GOAL LESS THAN 160     ASCUS with positive high risk HPV cervical     Diabetes mellitus, type 2 (H)     Morbid obesity (H)     Iron deficiency anemia secondary to inadequate dietary iron intake     Female infertility     Past Surgical History:   Procedure Laterality Date     CYSTECTOMY PILONIDAL  05/17/2012    Procedure:CYSTECTOMY PILONIDAL; Pilonidal cystectomy; Surgeon:PHONG STALEY; Location:[place] OR     INCISION AND DRAINAGE, ABSCESS, SIMPLE      pilonidal abscess     LAPAROSCOPIC SALPINGOOPHERECTOMY Left 07/01/2006    serous cystadenoma, Dr. Albert Razo       Social History     Tobacco Use     Smoking status: Never     Smokeless tobacco: Never   Vaping Use     Vaping status: Never Used   Substance Use Topics     Alcohol use: No     Family History   Problem Relation Age of Onset     Diabetes Father      Hypertension Father      Cardiovascular Maternal Grandfather         MI 42     Diabetes Paternal Grandmother      Hypertension Paternal Grandmother          Current Outpatient Medications   Medication Sig Dispense Refill     blood glucose (ACCU-CHEK GUIDE) test strip Use to test blood sugar 1 times daily or as directed. 100 each 11     blood glucose monitoring (ACCU-CHEK FASTCLIX) lancets Use to test blood sugar 1 times daily or as directed. 102 each 11     letrozole (FEMARA) 2.5 MG tablet Take 2 tablets (5 mg) by mouth daily 10 tablet 3     metFORMIN (GLUCOPHAGE XR) 500 MG 24 hr tablet Take 2 tablets (1,000 mg) by mouth 2 times daily (with meals) 360 tablet 1     Semaglutide, 1 MG/DOSE, (OZEMPIC, 1 MG/DOSE,) 4 MG/3ML pen Inject 1 mg Subcutaneous every 7 days 12 mL 0     ferrous gluconate (FERGON) 324 (38 Fe) MG tablet Take 1 tablet (324 mg) by mouth daily (with breakfast) 90 tablet 3     Allergies   Allergen Reactions     Nkda [No Known Drug Allergies]      Recent Labs   Lab Test 04/25/23  0721 12/23/22  1529 09/12/22  0738  22  0740 22  0754 22  0738 10/11/21  1642 21  0734 21  1055 10/26/20  0737 20  1211 19  0756 19  0827 19  0826   A1C 6.6* 6.5* 7.2*   < >  --    < >  --    < > 9.6* 9.3* 8.2*   < > 7.9*  --    LDL  --   --   --   --  81  --   --   --   --  70  --   --  110*  --    HDL  --   --   --   --  34*  --   --   --   --  40*  --   --  43*  --    TRIG  --   --   --   --  182*  --   --   --   --  206*  --   --  152*  --    ALT  --   --   --   --   --   --   --   --   --   --  61*  --   --  69*   CR  --   --   --   --  0.74  --   --   --  0.66  --  0.62  --   --  0.59   GFRESTIMATED  --   --   --   --  >90  --   --   --  >90  --  >90  --   --  >90   GFRESTBLACK  --   --   --   --   --   --   --   --  >90  --  >90  --   --  >90   POTASSIUM  --   --   --   --  3.5  --   --   --  3.5  --  4.1  --   --  4.0   TSH  --   --   --   --   --   --  1.96  --   --   --   --   --  3.23  --     < > = values in this interval not displayed.        Breast Cancer Screenin/24/2023     9:59 AM   Breast CA Risk Assessment (FHS-7)   Do you have a family history of breast, colon, or ovarian cancer? No / Unknown       click delete button to remove this line now  Patient under 40 years of age: Routine Mammogram Screening not recommended.   Pertinent mammograms are reviewed under the imaging tab.    History of abnormal Pap smear: NO - age 30- 65 PAP every 3 years recommended      Latest Ref Rng & Units 2022    11:08 AM 2021     1:53 PM 10/1/2019    10:15 AM   PAP / HPV   PAP  Negative for Intraepithelial Lesion or Malignancy (NILM)   Negative for Intraepithelial Lesion or Malignancy (NILM)      PAP (Historical)    NIL     HPV 16 DNA Negative Negative   Negative      HPV 18 DNA Negative Negative   Negative      Other HR HPV Negative Negative   Negative        Reviewed and updated as needed this visit by clinical staff                  Reviewed and updated as needed this visit by  Provider                 Past Medical History:   Diagnosis Date     ASCUS with positive high risk HPV 2015    1/15/2015:Pap--ASCUS, +HR HPV. Plan [place]-      Cervical high risk HPV (human papillomavirus) test positive     see problem list     Diabetes (H)      H/O colposcopy with cervical biopsy 2015    atypia     Pilonidal abscess 05/10/2010      Past Surgical History:   Procedure Laterality Date     CYSTECTOMY PILONIDAL  2012    Procedure:CYSTECTOMY PILONIDAL; Pilonidal cystectomy; Surgeon:PHONG STALEY; Location:[place] [facility]     INCISION AND DRAINAGE, ABSCESS, SIMPLE      pilonidal abscess     LAPAROSCOPIC SALPINGOOPHERECTOMY Left 2006    serous cystadenoma, Dr. Albert Razo     OB History    Para Term  AB Living   0 0 0 0 0 0   SAB IAB Ectopic Multiple Live Births   0 0 0 0 0       Review of Systems   Constitutional: Negative for chills and fever.   HENT: Negative for congestion, ear pain, hearing loss and sore throat.    Eyes: Negative for pain and visual disturbance.   Respiratory: Negative for cough and shortness of breath.    Cardiovascular: Negative for chest pain, palpitations and peripheral edema.   Gastrointestinal: Negative for abdominal pain, constipation, diarrhea, heartburn, hematochezia and nausea.   Breasts:  Negative for tenderness, breast mass and discharge.   Genitourinary: Negative for dysuria, frequency, genital sores, hematuria, pelvic pain, urgency, vaginal bleeding and vaginal discharge.   Musculoskeletal: Negative for arthralgias, joint swelling and myalgias.   Skin: Negative for rash.   Neurological: Negative for dizziness, weakness, headaches and paresthesias.   Psychiatric/Behavioral: Negative for mood changes. The patient is not nervous/anxious.           OBJECTIVE:   LMP 2023 (Exact Date)   Physical Exam  GENERAL APPEARANCE: healthy, alert and no distress  EYES: Eyes grossly normal to inspection, PERRL and conjunctivae and sclerae normal  HENT: ear  canals and TM's normal, nose and mouth without ulcers or lesions, oropharynx clear and oral mucous membranes moist  NECK: no adenopathy, no asymmetry, masses, or scars and thyroid normal to palpation  RESP: lungs clear to auscultation - no rales, rhonchi or wheezes  CV: regular rate and rhythm, normal S1 S2, no S3 or S4, no murmur, click or rub, no peripheral edema and peripheral pulses strong  ABDOMEN: soft, nontender, no hepatosplenomegaly, no masses and bowel sounds normal  MS: no musculoskeletal defects are noted and gait is age appropriate without ataxia  SKIN: no suspicious lesions or rashes  NEURO: Normal strength and tone, sensory exam grossly normal, mentation intact and speech normal  PSYCH: mentation appears normal and affect normal/bright    Diagnostic Test Results:  Labs reviewed in Epic  Results for orders placed or performed in visit on 04/25/23   HEMOGLOBIN A1C     Status: Abnormal   Result Value Ref Range    Hemoglobin A1C 6.6 (H) 0.0 - 5.6 %       ASSESSMENT/PLAN:     Encounter Diagnoses   Name Primary?     Annual physical exam Yes     Type 2 diabetes mellitus without complication, unspecified whether long term insulin use (H)      Type 2 diabetes mellitus without complication, without long-term current use of insulin (H)      Type 2 diabetes mellitus without complication, with long-term current use of insulin (H)      Malignant neoplasm of left ovary (H)      Morbid obesity (H)        Patient has been advised of split billing requirements and indicates understanding: Yes      COUNSELING:  Reviewed preventive health counseling, as reflected in patient instructions       Regular exercise       Healthy diet/nutrition       Vision screening       Hearing screening       Aspirin prophylaxis       Alcohol Use       Contraception       Family planning       Osteoporosis prevention/bone health       Colorectal Cancer Screening      BMI:   Estimated body mass index is 37.13 kg/m  as calculated from the  "following:    Height as of 4/11/23: 1.626 m (5' 4\").    Weight as of 4/11/23: 98.1 kg (216 lb 4.8 oz).   Weight management plan: Discussed healthy diet and exercise guidelines      She reports that she has never smoked. She has never used smokeless tobacco.          PARAMJIT Martell CNP  [facility]  "

## 2023-04-26 RX ORDER — FERROUS SULFATE 325(65) MG
325 TABLET ORAL
Qty: 90 TABLET | Refills: 3 | Status: SHIPPED | OUTPATIENT
Start: 2023-04-26 | End: 2024-01-15

## 2023-05-11 ENCOUNTER — OFFICE VISIT (OUTPATIENT)
Dept: OBGYN | Facility: CLINIC | Age: 33
End: 2023-05-11
Payer: COMMERCIAL

## 2023-05-11 VITALS
RESPIRATION RATE: 14 BRPM | TEMPERATURE: 98.9 F | BODY MASS INDEX: 37.24 KG/M2 | WEIGHT: 218.1 LBS | HEART RATE: 92 BPM | HEIGHT: 64 IN | SYSTOLIC BLOOD PRESSURE: 129 MMHG | DIASTOLIC BLOOD PRESSURE: 77 MMHG

## 2023-05-11 DIAGNOSIS — N97.9 FEMALE INFERTILITY: Primary | ICD-10-CM

## 2023-05-11 PROCEDURE — 96372 THER/PROPH/DIAG INJ SC/IM: CPT | Performed by: OBSTETRICS & GYNECOLOGY

## 2023-05-11 PROCEDURE — 99212 OFFICE O/P EST SF 10 MIN: CPT | Mod: 25 | Performed by: OBSTETRICS & GYNECOLOGY

## 2023-05-11 PROCEDURE — 76830 TRANSVAGINAL US NON-OB: CPT | Performed by: OBSTETRICS & GYNECOLOGY

## 2023-05-11 RX ORDER — CHORIONIC GONADOTROPIN 10000 UNIT
10000 KIT INTRAMUSCULAR ONCE
Status: COMPLETED | OUTPATIENT
Start: 2023-05-11 | End: 2023-05-11

## 2023-05-11 RX ADMIN — CHORIONIC GONADOTROPIN 10000 UNITS: KIT at 12:06

## 2023-05-11 NOTE — PROGRESS NOTES
Clinic Administered Medication Documentation        Patient was given Chorionic gonadotropin. Prior to medication administration, verified patient's identity using patient s name and date of birth. Please see MAR and medication order for additional information. Patient instructed to remain in clinic for 15 minutes and report any adverse reaction to staff immediately.    Vial/Syringe: Single dose vial. Was entire vial of medication used? Yes     Given right ventrogluteal

## 2023-05-11 NOTE — NURSING NOTE
"Initial /77 (BP Location: Left arm, Patient Position: Sitting, Cuff Size: Adult Regular)   Pulse 92   Temp 98.9  F (37.2  C) (Tympanic)   Resp 14   Ht 1.626 m (5' 4\")   Wt 98.9 kg (218 lb 1.6 oz)   LMP 04/27/2023 (Exact Date)   BMI 37.44 kg/m   Estimated body mass index is 37.44 kg/m  as calculated from the following:    Height as of this encounter: 1.626 m (5' 4\").    Weight as of this encounter: 98.9 kg (218 lb 1.6 oz). .      "

## 2023-05-11 NOTE — PROGRESS NOTES
[facility]    Follicular Ultrasound Note    Leigh Foreman  1990  [id number]    Indications:    Leigh Foreman is a 32 year old year old female, who is here for follicular ultrasound. She is on cycle day #14.     Cycle #1 Letrozole 5mg, trigger 1/5, timed intercourse, 1/13 prog 6.2  Cycle #2 Letrozole 5mg, trigger 3/10, prog 20  Cycle #3 Letrozole 5mg, trigger 4/10, IUI 4/11, 4/17 prog 12.8  Cycle #4 Letrozole 5mg, CURRENT CYCLE      Bedside Ultrasound:    Transvaginal ultrasound was performed. Uterus is anteverted. Endometrium measuring 10mm, trilaminar. Left ovary surgically absent. Right ovary with two follicles, measuring 15mm and 22mm.     Plan:    Trigger shot today. Plan for IUI tomorrow.     Chorionic gonadotropin is the standard of care for failed conservative measures of ovulation induction with timed intercourse. Therefore, this is a medically-indicated medication.         Keiry Mason DO

## 2023-05-12 ENCOUNTER — ALLIED HEALTH/NURSE VISIT (OUTPATIENT)
Dept: OBGYN | Facility: CLINIC | Age: 33
End: 2023-05-12
Payer: COMMERCIAL

## 2023-05-12 ENCOUNTER — OFFICE VISIT (OUTPATIENT)
Dept: OBGYN | Facility: CLINIC | Age: 33
End: 2023-05-12
Payer: COMMERCIAL

## 2023-05-12 VITALS
WEIGHT: 219 LBS | BODY MASS INDEX: 38.8 KG/M2 | SYSTOLIC BLOOD PRESSURE: 127 MMHG | HEART RATE: 100 BPM | RESPIRATION RATE: 16 BRPM | DIASTOLIC BLOOD PRESSURE: 94 MMHG | TEMPERATURE: 99.4 F | HEIGHT: 63 IN

## 2023-05-12 DIAGNOSIS — N97.9 FEMALE INFERTILITY: ICD-10-CM

## 2023-05-12 DIAGNOSIS — N97.9 FEMALE INFERTILITY: Primary | ICD-10-CM

## 2023-05-12 DIAGNOSIS — E66.812 CLASS 2 OBESITY: Primary | ICD-10-CM

## 2023-05-12 DIAGNOSIS — E11.9 TYPE 2 DIABETES MELLITUS WITHOUT COMPLICATION, UNSPECIFIED WHETHER LONG TERM INSULIN USE (H): ICD-10-CM

## 2023-05-12 PROCEDURE — 99207 PR NO CHARGE NURSE ONLY: CPT

## 2023-05-12 PROCEDURE — 58322 ARTIFICIAL INSEMINATION: CPT | Performed by: OBSTETRICS & GYNECOLOGY

## 2023-05-12 NOTE — PROGRESS NOTES
Patients Significant other came into clinic to leave a sperm sample for patients scheduled IUI appointment with Dr. Mason. Please see office visit note for further documentation.    Radha Hare LPN on 5/12/2023 at 1:08 PM

## 2023-05-12 NOTE — PROGRESS NOTES
[facility]    IUI Procedure Note    Leigh Foreman  1990  [id number]    Indications:    Leigh Foreman is a 32 year old year old female, who is here for IUI. Informed consent was singed after a discussion of the risks of intrauterine insemination (bleeding, infection, transmission of STIs, uterine cramping). Received trigger shot yesterday.      Procedure:   Semen specimen was prepared per protocol. The patient was placed in dorsal lithotomy position. A speculum was placed in the vagina and the cervix visualized. A specimen catheter labeled with the patient's information was placed through the cervix and the sample was injected. The patient tolerated the procedure well. There were no complications and no blood loss. The patient remained in our clinic with elevated pelvis for 20 min after the insemination. Plan for day #21 progesterone. Plan for pregnancy test with missed menses.       Keiry Mason DO

## 2023-05-12 NOTE — NURSING NOTE
"Initial BP (!) 127/94 (BP Location: Right arm, Patient Position: Chair, Cuff Size: Adult Regular)   Pulse 100   Temp 99.4  F (37.4  C) (Tympanic)   Resp 16   Ht 1.6 m (5' 3\")   Wt 99.3 kg (219 lb)   LMP 04/27/2023 (Exact Date)   BMI 38.79 kg/m   Estimated body mass index is 38.79 kg/m  as calculated from the following:    Height as of this encounter: 1.6 m (5' 3\").    Weight as of this encounter: 99.3 kg (219 lb). .      "

## 2023-05-18 ENCOUNTER — LAB (OUTPATIENT)
Dept: LAB | Facility: CLINIC | Age: 33
End: 2023-05-18
Payer: COMMERCIAL

## 2023-05-18 DIAGNOSIS — N97.0 ANOVULATION: ICD-10-CM

## 2023-05-18 PROCEDURE — 84144 ASSAY OF PROGESTERONE: CPT

## 2023-05-18 PROCEDURE — 36415 COLL VENOUS BLD VENIPUNCTURE: CPT

## 2023-05-19 LAB — PROGEST SERPL-MCNC: 13.2 NG/ML

## 2023-06-02 ENCOUNTER — LAB (OUTPATIENT)
Dept: LAB | Facility: CLINIC | Age: 33
End: 2023-06-02
Payer: COMMERCIAL

## 2023-06-02 DIAGNOSIS — D50.8 IRON DEFICIENCY ANEMIA SECONDARY TO INADEQUATE DIETARY IRON INTAKE: ICD-10-CM

## 2023-06-02 LAB
ERYTHROCYTE [DISTWIDTH] IN BLOOD BY AUTOMATED COUNT: 19.1 % (ref 10–15)
FERRITIN SERPL-MCNC: 10 NG/ML (ref 6–175)
HCT VFR BLD AUTO: 33 % (ref 35–47)
HGB BLD-MCNC: 9.7 G/DL (ref 11.7–15.7)
IRON BINDING CAPACITY (ROCHE): 422 UG/DL (ref 240–430)
IRON SATN MFR SERPL: 25 % (ref 15–46)
IRON SERPL-MCNC: 106 UG/DL (ref 37–145)
MCH RBC QN AUTO: 20 PG (ref 26.5–33)
MCHC RBC AUTO-ENTMCNC: 29.4 G/DL (ref 31.5–36.5)
MCV RBC AUTO: 68 FL (ref 78–100)
PLATELET # BLD AUTO: 357 10E3/UL (ref 150–450)
RBC # BLD AUTO: 4.85 10E6/UL (ref 3.8–5.2)
WBC # BLD AUTO: 6.1 10E3/UL (ref 4–11)

## 2023-06-02 PROCEDURE — 36415 COLL VENOUS BLD VENIPUNCTURE: CPT

## 2023-06-02 PROCEDURE — 83550 IRON BINDING TEST: CPT

## 2023-06-02 PROCEDURE — 85027 COMPLETE CBC AUTOMATED: CPT

## 2023-06-02 PROCEDURE — 83540 ASSAY OF IRON: CPT

## 2023-06-02 PROCEDURE — 82728 ASSAY OF FERRITIN: CPT

## 2023-06-07 ENCOUNTER — PATIENT OUTREACH (OUTPATIENT)
Dept: ONCOLOGY | Facility: CLINIC | Age: 33
End: 2023-06-07
Payer: COMMERCIAL

## 2023-06-07 DIAGNOSIS — D50.8 IRON DEFICIENCY ANEMIA SECONDARY TO INADEQUATE DIETARY IRON INTAKE: Primary | ICD-10-CM

## 2023-06-07 NOTE — PROGRESS NOTES
Referral received for benign heme services, see below.    Referral reason: Anemia, currently without iron deficiency due to oral iron intake.     Current abnormal labs: Available in Chart Review    Outreach: MyChart sent to patient    Plan: Triage instructions updated and sent to NPS for completion.

## 2023-06-23 ENCOUNTER — TELEPHONE (OUTPATIENT)
Dept: FAMILY MEDICINE | Facility: CLINIC | Age: 33
End: 2023-06-23
Payer: COMMERCIAL

## 2023-06-23 NOTE — TELEPHONE ENCOUNTER
Patient Quality Outreach    Patient is due for the following:   Diabetes -  BP Check  Hypertension -  Hypertension follow-up visit    Next Steps:   Schedule a nurse only visit for blood pressure check    Type of outreach:    Phone, left message for patient/parent to call back.      Questions for provider review:    None           Ro Trivedi, CMA

## 2023-06-26 ENCOUNTER — LAB (OUTPATIENT)
Dept: LAB | Facility: CLINIC | Age: 33
End: 2023-06-26
Payer: COMMERCIAL

## 2023-06-26 DIAGNOSIS — D50.8 IRON DEFICIENCY ANEMIA SECONDARY TO INADEQUATE DIETARY IRON INTAKE: ICD-10-CM

## 2023-06-26 LAB
BASOPHILS # BLD AUTO: 0 10E3/UL (ref 0–0.2)
BASOPHILS NFR BLD AUTO: 0 %
EOSINOPHIL # BLD AUTO: 0.3 10E3/UL (ref 0–0.7)
EOSINOPHIL NFR BLD AUTO: 4 %
ERYTHROCYTE [DISTWIDTH] IN BLOOD BY AUTOMATED COUNT: 21.7 % (ref 10–15)
HCT VFR BLD AUTO: 35.8 % (ref 35–47)
HGB BLD-MCNC: 11 G/DL (ref 11.7–15.7)
IMM GRANULOCYTES # BLD: 0 10E3/UL
IMM GRANULOCYTES NFR BLD: 0 %
LYMPHOCYTES # BLD AUTO: 2.1 10E3/UL (ref 0.8–5.3)
LYMPHOCYTES NFR BLD AUTO: 27 %
MCH RBC QN AUTO: 21.7 PG (ref 26.5–33)
MCHC RBC AUTO-ENTMCNC: 30.7 G/DL (ref 31.5–36.5)
MCV RBC AUTO: 71 FL (ref 78–100)
MONOCYTES # BLD AUTO: 0.4 10E3/UL (ref 0–1.3)
MONOCYTES NFR BLD AUTO: 6 %
NEUTROPHILS # BLD AUTO: 4.8 10E3/UL (ref 1.6–8.3)
NEUTROPHILS NFR BLD AUTO: 63 %
PLATELET # BLD AUTO: 349 10E3/UL (ref 150–450)
RBC # BLD AUTO: 5.08 10E6/UL (ref 3.8–5.2)
RETICS # AUTO: 0.1 10E6/UL (ref 0.03–0.1)
RETICS/RBC NFR AUTO: 1.9 % (ref 0.5–2)
WBC # BLD AUTO: 7.7 10E3/UL (ref 4–11)

## 2023-06-26 PROCEDURE — 85045 AUTOMATED RETICULOCYTE COUNT: CPT

## 2023-06-26 PROCEDURE — 36415 COLL VENOUS BLD VENIPUNCTURE: CPT

## 2023-06-26 PROCEDURE — 85025 COMPLETE CBC W/AUTO DIFF WBC: CPT

## 2023-06-29 LAB
PATH REPORT.COMMENTS IMP SPEC: NORMAL
PATH REPORT.FINAL DX SPEC: NORMAL
PATH REPORT.MICROSCOPIC SPEC OTHER STN: NORMAL
PATH REPORT.MICROSCOPIC SPEC OTHER STN: NORMAL
PATH REPORT.RELEVANT HX SPEC: NORMAL

## 2023-07-03 ENCOUNTER — ALLIED HEALTH/NURSE VISIT (OUTPATIENT)
Dept: FAMILY MEDICINE | Facility: CLINIC | Age: 33
End: 2023-07-03
Payer: COMMERCIAL

## 2023-07-03 VITALS — SYSTOLIC BLOOD PRESSURE: 112 MMHG | DIASTOLIC BLOOD PRESSURE: 72 MMHG

## 2023-07-03 DIAGNOSIS — E11.9 TYPE 2 DIABETES MELLITUS WITHOUT COMPLICATION, WITHOUT LONG-TERM CURRENT USE OF INSULIN (H): Primary | ICD-10-CM

## 2023-07-03 PROCEDURE — 99207 PR NO CHARGE NURSE ONLY: CPT

## 2023-07-03 NOTE — PROGRESS NOTES
BP Readings from Last 6 Encounters:   05/12/23 (!) 127/94   05/11/23 129/77   04/25/23 118/70   04/11/23 124/68   04/10/23 123/76   03/10/23 118/78     Leigh Foreman is a 32 year old year old patient who comes in today for a Blood Pressure check because last BP was 127/94  Vital Signs as repeated by RN: 112/72  Current complaints: none  Disposition:  BP check as needed.  Cora Boyd RN

## 2023-08-04 ENCOUNTER — HOSPITAL ENCOUNTER (OUTPATIENT)
Dept: ULTRASOUND IMAGING | Facility: CLINIC | Age: 33
Discharge: HOME | End: 2023-08-04
Attending: OBSTETRICS & GYNECOLOGY | Admitting: OBSTETRICS & GYNECOLOGY
Payer: COMMERCIAL

## 2023-08-04 ENCOUNTER — VIRTUAL VISIT (OUTPATIENT)
Dept: OBGYN | Facility: CLINIC | Age: 33
End: 2023-08-04
Payer: COMMERCIAL

## 2023-08-04 VITALS — WEIGHT: 212 LBS | BODY MASS INDEX: 37.55 KG/M2

## 2023-08-04 DIAGNOSIS — N97.9 FEMALE INFERTILITY: ICD-10-CM

## 2023-08-04 DIAGNOSIS — N97.9 FEMALE INFERTILITY: Primary | ICD-10-CM

## 2023-08-04 PROCEDURE — 76830 TRANSVAGINAL US NON-OB: CPT

## 2023-08-04 PROCEDURE — 99213 OFFICE O/P EST LOW 20 MIN: CPT | Mod: 93 | Performed by: OBSTETRICS & GYNECOLOGY

## 2023-08-04 RX ORDER — LETROZOLE 2.5 MG/1
5 TABLET, FILM COATED ORAL DAILY
Qty: 10 TABLET | Refills: 12 | Status: SHIPPED | OUTPATIENT
Start: 2023-08-04 | End: 2023-10-30

## 2023-08-04 NOTE — PROGRESS NOTES
Francisca is a 32 year old who is being evaluated via a billable telephone visit.      What phone number would you like to be contacted at? [phone]  How would you like to obtain your AVS? [employer]    Distant Location (provider location):  On-site, the patient was located at her home in [place] during this call.       Subjective   Francisca is a 32 year old, presenting for the following health issues:  Fertility    Fertility treatment planning.   Francisca has previously done the following fertility treatments.   Cycle #1 Letrozole 5mg, trigger 1/5, timed intercourse, 1/13 prog 6.2  Cycle #2 Letrozole 5mg, trigger 3/10, prog 20  Cycle #3 Letrozole 5mg, trigger 4/10, IUI 4/11, 4/17 prog 12.8  Cycle #4 Letrozole 5mg, IUI, prog 13.2    She did not take letrazole this cycle as she did not have any refills.         Objective           Vitals:  No vitals were obtained today due to virtual visit.    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   Discussed no follicles on this follicular US. Recommended OPKs and timed intercourse if she receives a positive result. Otherwise, with next menses plan 5mg letrazole (this was sent) and Reissued message with menses. Wants follicular US/trigger injection/IUI.     Phone call duration: 3 minutes, start time 3:57PM, 4:00PM  An addition 8 min was spent on documentation, chart review and lab/imaging revew, care  coordination for a total of 11min.     Norma Higginbotham MD  OB/GYN

## 2023-08-04 NOTE — PROGRESS NOTES
{PROVIDER CHARTING PREFERENCE:551496}    Bob Espinosa is a 32 year old, presenting for the following health issues:  Fertility  {(!) Visit Details have not yet been documented.  Please enter Visit Details and then use this list to pull in documentation. (Optional):068816}    HPI     {SUPERLIST (Optional):545474}  {additonal problems for provider to add (Optional):298852}      Review of Systems   {ROS COMP (Optional):237032}      Objective    Wt 96.2 kg (212 lb)   LMP 06/27/2023   BMI 37.55 kg/m    Body mass index is 37.55 kg/m .  Physical Exam   {Exam List (Optional):787870}    {Diagnostic Test Results (Optional):333143}    {AMBULATORY ATTESTATION (Optional):997617}

## 2023-09-10 ENCOUNTER — HEALTH MAINTENANCE LETTER (OUTPATIENT)
Age: 33
End: 2023-09-10

## 2023-09-14 ENCOUNTER — OFFICE VISIT (OUTPATIENT)
Dept: OBGYN | Facility: CLINIC | Age: 33
End: 2023-09-14
Payer: COMMERCIAL

## 2023-09-14 VITALS
BODY MASS INDEX: 36.58 KG/M2 | TEMPERATURE: 98.5 F | DIASTOLIC BLOOD PRESSURE: 79 MMHG | SYSTOLIC BLOOD PRESSURE: 106 MMHG | HEIGHT: 64 IN | HEART RATE: 95 BPM | WEIGHT: 214.3 LBS

## 2023-09-14 DIAGNOSIS — N97.0 ANOVULATION: ICD-10-CM

## 2023-09-14 DIAGNOSIS — N97.9 FEMALE INFERTILITY: Primary | ICD-10-CM

## 2023-09-14 DIAGNOSIS — E28.2 PCOS (POLYCYSTIC OVARIAN SYNDROME): ICD-10-CM

## 2023-09-14 PROCEDURE — 99213 OFFICE O/P EST LOW 20 MIN: CPT | Mod: 25 | Performed by: OBSTETRICS & GYNECOLOGY

## 2023-09-14 PROCEDURE — 76857 US EXAM PELVIC LIMITED: CPT | Performed by: OBSTETRICS & GYNECOLOGY

## 2023-09-14 NOTE — PROGRESS NOTES
[facility]    Follicular Ultrasound Note    Leigh Foreman  1990  [id number]    Indications:    Liegh Foreman is a 32 year old year old female, who is here for follicular ultrasound. She is on cycle day #11. Ovulation induction with Letrozole 5mg.     Cycle #1 Letrozole 5mg, trigger 1/5, timed intercourse, 1/13 prog 6.2  Cycle #2 Letrozole 5mg, trigger 3/10, prog 20  Cycle #3 Letrozole 5mg, trigger 4/10, IUI 4/11, 4/17 prog 12.8  Cycle #4 Letrozole 5mg, IUI 5/12, 5/18 prog 13.2    No fertility cycles over the summer    Cycle #5 Letrozole 5mg, CURRENT CYCLE      Bedside Ultrasound:    Transvaginal ultrasound was performed. Uterus is anteverted. Endometrium measuring 4.8mm. Left ovary surgically absent. Right ovary with multiple small follicles, consistent with PCOS diagnosis. One dominant appearing follicle measuring 11mm.     Plan:    One dominant appearing follicle, but still early.  Plan to monitor home ovulation test this weekend.  Follow-up Monday, 9/18, if no positive tests for repeat follicle ultrasound.  Patient would like trigger shot and IUI this cycle if possible.  If home ovulation test positive on Sunday could plan IUI on Monday.    Plan progesterone lab draw for 7 days after ovulation or trigger shot.    Keiry Mason DO

## 2023-09-14 NOTE — NURSING NOTE
"Initial /79 (BP Location: Left arm, Patient Position: Sitting, Cuff Size: Adult Regular)   Pulse 95   Temp 98.5  F (36.9  C) (Tympanic)   Ht 1.626 m (5' 4\")   Wt 97.2 kg (214 lb 4.8 oz)   LMP 09/04/2023   BMI 36.78 kg/m   Estimated body mass index is 36.78 kg/m  as calculated from the following:    Height as of this encounter: 1.626 m (5' 4\").    Weight as of this encounter: 97.2 kg (214 lb 4.8 oz). .    "

## 2023-09-18 ENCOUNTER — OFFICE VISIT (OUTPATIENT)
Dept: OBGYN | Facility: CLINIC | Age: 33
End: 2023-09-18
Payer: COMMERCIAL

## 2023-09-18 VITALS
WEIGHT: 217 LBS | HEIGHT: 63 IN | DIASTOLIC BLOOD PRESSURE: 79 MMHG | BODY MASS INDEX: 38.45 KG/M2 | TEMPERATURE: 99.2 F | RESPIRATION RATE: 16 BRPM | SYSTOLIC BLOOD PRESSURE: 114 MMHG | HEART RATE: 88 BPM

## 2023-09-18 DIAGNOSIS — N97.0 ANOVULATION: Primary | ICD-10-CM

## 2023-09-18 PROCEDURE — 76830 TRANSVAGINAL US NON-OB: CPT | Performed by: OBSTETRICS & GYNECOLOGY

## 2023-09-18 PROCEDURE — 99213 OFFICE O/P EST LOW 20 MIN: CPT | Mod: 25 | Performed by: OBSTETRICS & GYNECOLOGY

## 2023-09-18 PROCEDURE — 96372 THER/PROPH/DIAG INJ SC/IM: CPT | Performed by: OBSTETRICS & GYNECOLOGY

## 2023-09-18 RX ORDER — CHORIONIC GONADOTROPIN 10000 UNIT
10000 KIT INTRAMUSCULAR ONCE
Status: COMPLETED | OUTPATIENT
Start: 2023-09-18 | End: 2023-09-18

## 2023-09-18 RX ORDER — CHORIONIC GONADOTROPIN 10000 UNIT
10000 KIT INTRAMUSCULAR ONCE
Qty: 1 EACH | Refills: 0 | Status: CANCELLED | OUTPATIENT
Start: 2023-09-18 | End: 2023-09-18

## 2023-09-18 RX ORDER — CHORIONIC GONADOTROPIN 10000 UNIT
10000 KIT INTRAMUSCULAR ONCE
Qty: 1 EACH | Refills: 0 | Status: SHIPPED | OUTPATIENT
Start: 2023-09-18 | End: 2023-09-18

## 2023-09-18 RX ADMIN — CHORIONIC GONADOTROPIN 10000 UNITS: KIT at 12:32

## 2023-09-18 NOTE — NURSING NOTE
"Initial /79 (BP Location: Left arm, Patient Position: Chair, Cuff Size: Adult Regular)   Pulse 88   Temp 99.2  F (37.3  C) (Tympanic)   Resp 16   Ht 1.6 m (5' 3\")   Wt 98.4 kg (217 lb)   LMP 09/04/2023   BMI 38.44 kg/m   Estimated body mass index is 38.44 kg/m  as calculated from the following:    Height as of this encounter: 1.6 m (5' 3\").    Weight as of this encounter: 98.4 kg (217 lb). .    "

## 2023-09-18 NOTE — PROGRESS NOTES
[facility]    Follicular Ultrasound Note    Leigh Foreman  1990  [id number]    Indications:    Leigh Foreman is a 32 year old year old female, who is here for follicular ultrasound. She is on cycle day #15. Ovulation induction with Letrozole 5mg.     Cycle #1 Letrozole 5mg, trigger 1/5, timed intercourse, 1/13 prog 6.2  Cycle #2 Letrozole 5mg, trigger 3/10, prog 20  Cycle #3 Letrozole 5mg, trigger 4/10, IUI 4/11, 4/17 prog 12.8  Cycle #4 Letrozole 5mg, IUI 5/12, 5/18 prog 13.2     No fertility cycles over the summer     Cycle #5 Letrozole 5mg, CURRENT CYCLE      Bedside Ultrasound:    Transvaginal ultrasound was performed. Uterus is anteverted. Endometrium measuring 13mm. Left ovary surgically absent. Right ovary with dominant follicle measuring 20mm. Secondary follicle measuring 14mm.     Plan:    Plan trigger shot today and IUI tomorrow. Likely just one follicle, very small risk that the 14mm follicle would ovulate. Patient aware of risk of multiple gestation.    Chorionic gonadotropin is the standard of care for failed conservative measures of ovulation induction with timed intercourse. Therefore, this is a medically-indicated medication.           Keiry Mason DO

## 2023-09-18 NOTE — PROGRESS NOTES
Clinic Administered Medication Documentation        Patient was given Pregnyl. Prior to medication administration, verified patient's identity using patient s name and date of birth. Please see MAR and medication order for additional information. Patient instructed to remain in clinic for 15 minutes and report any adverse reaction to staff immediately.    Vial/Syringe: Single dose vial. Was entire vial of medication used? Yes     Radha Hare LPN

## 2023-09-19 ENCOUNTER — ALLIED HEALTH/NURSE VISIT (OUTPATIENT)
Dept: OBGYN | Facility: CLINIC | Age: 33
End: 2023-09-19
Payer: COMMERCIAL

## 2023-09-19 ENCOUNTER — OFFICE VISIT (OUTPATIENT)
Dept: OBGYN | Facility: CLINIC | Age: 33
End: 2023-09-19
Payer: COMMERCIAL

## 2023-09-19 VITALS
WEIGHT: 214.4 LBS | DIASTOLIC BLOOD PRESSURE: 76 MMHG | HEART RATE: 80 BPM | SYSTOLIC BLOOD PRESSURE: 112 MMHG | RESPIRATION RATE: 12 BRPM | BODY MASS INDEX: 36.6 KG/M2 | HEIGHT: 64 IN | TEMPERATURE: 97.7 F

## 2023-09-19 DIAGNOSIS — E28.2 PCOS (POLYCYSTIC OVARIAN SYNDROME): ICD-10-CM

## 2023-09-19 DIAGNOSIS — N97.9 FEMALE INFERTILITY: Primary | ICD-10-CM

## 2023-09-19 PROCEDURE — 58322 ARTIFICIAL INSEMINATION: CPT | Performed by: OBSTETRICS & GYNECOLOGY

## 2023-09-19 PROCEDURE — 99207 PR NO CHARGE NURSE ONLY: CPT

## 2023-09-19 NOTE — PROGRESS NOTES
[facility]    IUI Procedure Note    Leigh Foreman  1990  [id number]    Indications:    Leigh Foreman is a 32 year old year old female, who is here for IUI. IUI indicated for infertility treatment due to history of PCOS with anovulation, has failed conservative therapies. Informed consent was singed after a discussion of the risks of intrauterine insemination (bleeding, infection, transmission of STIs, uterine cramping).       Procedure:   Semen specimen was prepared per protocol. The patient was placed in dorsal lithotomy position. A speculum was placed in the vagina and the cervix visualized. A specimen catheter labeled with the patient's information was placed through the cervix and the sample was injected. The patient tolerated the procedure well. There were no complications and no blood loss. The patient remained in our clinic with elevated pelvis for 20 min after the insemination. Plan for day #21 progesterone. Plan for pregnancy test with missed menses.       Keiry Mason DO

## 2023-09-19 NOTE — NURSING NOTE
"Initial /76 (BP Location: Right arm, Patient Position: Sitting, Cuff Size: Adult Large)   Pulse 80   Temp 97.7  F (36.5  C) (Tympanic)   Resp 12   Ht 1.626 m (5' 4\")   Wt 97.3 kg (214 lb 6.4 oz)   LMP 09/04/2023 (Exact Date)   BMI 36.80 kg/m   Estimated body mass index is 36.8 kg/m  as calculated from the following:    Height as of this encounter: 1.626 m (5' 4\").    Weight as of this encounter: 97.3 kg (214 lb 6.4 oz). .    "

## 2023-09-25 ENCOUNTER — LAB (OUTPATIENT)
Dept: LAB | Facility: CLINIC | Age: 33
End: 2023-09-25
Payer: COMMERCIAL

## 2023-09-25 DIAGNOSIS — N97.0 ANOVULATION: ICD-10-CM

## 2023-09-25 DIAGNOSIS — E28.2 PCOS (POLYCYSTIC OVARIAN SYNDROME): ICD-10-CM

## 2023-09-25 LAB — PROGEST SERPL-MCNC: 12.2 NG/ML

## 2023-09-25 PROCEDURE — 84144 ASSAY OF PROGESTERONE: CPT

## 2023-09-25 PROCEDURE — 36415 COLL VENOUS BLD VENIPUNCTURE: CPT

## 2023-10-19 ENCOUNTER — OFFICE VISIT (OUTPATIENT)
Dept: OBGYN | Facility: CLINIC | Age: 33
End: 2023-10-19
Payer: COMMERCIAL

## 2023-10-19 VITALS
RESPIRATION RATE: 18 BRPM | HEART RATE: 89 BPM | HEIGHT: 64 IN | WEIGHT: 219 LBS | BODY MASS INDEX: 37.39 KG/M2 | DIASTOLIC BLOOD PRESSURE: 78 MMHG | SYSTOLIC BLOOD PRESSURE: 118 MMHG | TEMPERATURE: 98.5 F

## 2023-10-19 DIAGNOSIS — N97.9 FEMALE INFERTILITY: ICD-10-CM

## 2023-10-19 DIAGNOSIS — E28.2 PCOS (POLYCYSTIC OVARIAN SYNDROME): Primary | ICD-10-CM

## 2023-10-19 DIAGNOSIS — N97.0 ANOVULATION: ICD-10-CM

## 2023-10-19 PROCEDURE — 99213 OFFICE O/P EST LOW 20 MIN: CPT | Mod: 25 | Performed by: OBSTETRICS & GYNECOLOGY

## 2023-10-19 PROCEDURE — 76830 TRANSVAGINAL US NON-OB: CPT | Performed by: OBSTETRICS & GYNECOLOGY

## 2023-10-19 PROCEDURE — 96372 THER/PROPH/DIAG INJ SC/IM: CPT | Performed by: OBSTETRICS & GYNECOLOGY

## 2023-10-19 RX ORDER — CHORIONIC GONADOTROPIN 10000 UNIT
10000 KIT INTRAMUSCULAR ONCE
Status: COMPLETED | OUTPATIENT
Start: 2023-11-02 | End: 2023-10-19

## 2023-10-19 RX ADMIN — CHORIONIC GONADOTROPIN 10000 UNITS: KIT at 14:02

## 2023-10-19 NOTE — NURSING NOTE
"Initial /78 (BP Location: Left arm, Patient Position: Chair, Cuff Size: Adult Regular)   Pulse 89   Temp 98.5  F (36.9  C) (Tympanic)   Resp 18   Ht 1.626 m (5' 4\")   Wt 99.3 kg (219 lb)   LMP 09/04/2023 (Exact Date)   BMI 37.59 kg/m   Estimated body mass index is 37.59 kg/m  as calculated from the following:    Height as of this encounter: 1.626 m (5' 4\").    Weight as of this encounter: 99.3 kg (219 lb). .    "

## 2023-10-19 NOTE — PROGRESS NOTES
[facility]    Follicular Ultrasound Note    Leigh Foreman  1990  [id number]    Indications:    Leigh Foreman is a 32 year old year old female, who is here for follicular ultrasound. She is on cycle day #14. Ovulation induction with Letrozole 5mg.     Cycle #1 Letrozole 5mg, trigger 1/5, timed intercourse, 1/13 prog 6.2  Cycle #2 Letrozole 5mg, trigger 3/10, prog 20  Cycle #3 Letrozole 5mg, trigger 4/10, IUI 4/11, 4/17 prog 12.8  Cycle #4 Letrozole 5mg, IUI 5/12, 5/18 prog 13.2     No fertility cycles over the summer     Cycle #5 Letrozole 5mg, IUI 9/19, 9/25 prog 12.2  Cycle #6 Letrozole 5mg, Current cycle      Bedside Ultrasound:    Transvaginal ultrasound was performed. Uterus is anteverted. Endometrium measuring 9.7mm. Left ovary surgically absent. Right ovary with dominant follicle measuring 19mm. Second follicle measuring 16mm. Polycystic appearing ovary.     Plan:    Will plan for trigger shot today and IUI tomorrow. Chorionic gonadotropin is the standard of care for failed conservative measures of ovulation induction with timed intercourse. Therefore, this is a medically-indicated medication. Discussed risk of multiple gestation.    Follow up: IUI tomorrow, progesterone level day #21.      Keiry Mason DO

## 2023-10-20 ENCOUNTER — ALLIED HEALTH/NURSE VISIT (OUTPATIENT)
Dept: OBGYN | Facility: CLINIC | Age: 33
End: 2023-10-20
Payer: COMMERCIAL

## 2023-10-20 ENCOUNTER — OFFICE VISIT (OUTPATIENT)
Dept: OBGYN | Facility: CLINIC | Age: 33
End: 2023-10-20
Payer: COMMERCIAL

## 2023-10-20 VITALS
HEART RATE: 87 BPM | HEIGHT: 64 IN | DIASTOLIC BLOOD PRESSURE: 79 MMHG | BODY MASS INDEX: 37.22 KG/M2 | WEIGHT: 218 LBS | SYSTOLIC BLOOD PRESSURE: 118 MMHG | RESPIRATION RATE: 16 BRPM | TEMPERATURE: 98.9 F

## 2023-10-20 DIAGNOSIS — N97.9 FEMALE INFERTILITY: Primary | ICD-10-CM

## 2023-10-20 PROCEDURE — 58322 ARTIFICIAL INSEMINATION: CPT | Performed by: OBSTETRICS & GYNECOLOGY

## 2023-10-20 PROCEDURE — 99207 PR NO CHARGE NURSE ONLY: CPT

## 2023-10-20 NOTE — PROGRESS NOTES
[facility]    IUI Procedure Note    Leigh Foreman  1990  [id number]    Indications:    Leigh Foreman is a 32 year old year old female, who is here for IUI. Informed consent was singed after a discussion of the risks of intrauterine insemination (bleeding, infection, transmission of STIs, uterine cramping).       Procedure:   Semen specimen was prepared per protocol. Low volume specimen but still motile sperm seen. Discussed with patient, recommend intercourse today and tomorrow as well to help optimize opportunities. The patient was placed in dorsal lithotomy position. A speculum was placed in the vagina and the cervix visualized. A specimen catheter labeled with the patient's information was placed through the cervix and the sample was injected. The patient tolerated the procedure well. There were no complications and no blood loss. The patient remained in our clinic with elevated pelvis for 20 min after the insemination. Plan for day #21 progesterone. Plan for pregnancy test with missed menses.       Keiry Mason DO

## 2023-10-20 NOTE — PROGRESS NOTES
Patients Significant other came into clinic to leave a sperm sample for patients scheduled IUI appointment with Dr. Brandt. Please see office visit note for further documentation.    Radha Hare LPN on 10/20/2023 at 9:00 AM

## 2023-10-20 NOTE — NURSING NOTE
"Initial /79 (BP Location: Right arm, Patient Position: Chair, Cuff Size: Adult Regular)   Pulse 87   Temp 98.9  F (37.2  C) (Tympanic)   Resp 16   Ht 1.626 m (5' 4\")   Wt 98.9 kg (218 lb)   LMP 10/06/2023 (Exact Date)   BMI 37.42 kg/m   Estimated body mass index is 37.42 kg/m  as calculated from the following:    Height as of this encounter: 1.626 m (5' 4\").    Weight as of this encounter: 98.9 kg (218 lb). .    "

## 2023-10-26 ENCOUNTER — LAB (OUTPATIENT)
Dept: LAB | Facility: CLINIC | Age: 33
End: 2023-10-26
Payer: COMMERCIAL

## 2023-10-26 DIAGNOSIS — E28.2 PCOS (POLYCYSTIC OVARIAN SYNDROME): ICD-10-CM

## 2023-10-26 DIAGNOSIS — N97.0 ANOVULATION: ICD-10-CM

## 2023-10-26 PROCEDURE — 36415 COLL VENOUS BLD VENIPUNCTURE: CPT

## 2023-10-26 PROCEDURE — 84144 ASSAY OF PROGESTERONE: CPT

## 2023-10-27 LAB — PROGEST SERPL-MCNC: 9.3 NG/ML

## 2023-10-30 ENCOUNTER — MYC MEDICAL ADVICE (OUTPATIENT)
Dept: FAMILY MEDICINE | Facility: CLINIC | Age: 33
End: 2023-10-30

## 2023-10-30 ENCOUNTER — OFFICE VISIT (OUTPATIENT)
Dept: FAMILY MEDICINE | Facility: CLINIC | Age: 33
End: 2023-10-30
Payer: COMMERCIAL

## 2023-10-30 VITALS
SYSTOLIC BLOOD PRESSURE: 110 MMHG | HEIGHT: 64 IN | RESPIRATION RATE: 16 BRPM | HEART RATE: 87 BPM | OXYGEN SATURATION: 100 % | WEIGHT: 221 LBS | BODY MASS INDEX: 37.73 KG/M2 | DIASTOLIC BLOOD PRESSURE: 60 MMHG | TEMPERATURE: 98.9 F

## 2023-10-30 DIAGNOSIS — Z00.00 ROUTINE GENERAL MEDICAL EXAMINATION AT A HEALTH CARE FACILITY: Primary | ICD-10-CM

## 2023-10-30 DIAGNOSIS — E66.812 CLASS 2 SEVERE OBESITY DUE TO EXCESS CALORIES WITH SERIOUS COMORBIDITY AND BODY MASS INDEX (BMI) OF 38.0 TO 38.9 IN ADULT (H): ICD-10-CM

## 2023-10-30 DIAGNOSIS — E11.9 TYPE 2 DIABETES MELLITUS WITHOUT COMPLICATION, WITHOUT LONG-TERM CURRENT USE OF INSULIN (H): ICD-10-CM

## 2023-10-30 DIAGNOSIS — Z79.4 TYPE 2 DIABETES MELLITUS WITHOUT COMPLICATION, WITH LONG-TERM CURRENT USE OF INSULIN (H): ICD-10-CM

## 2023-10-30 DIAGNOSIS — E11.9 TYPE 2 DIABETES MELLITUS WITHOUT COMPLICATION, WITH LONG-TERM CURRENT USE OF INSULIN (H): ICD-10-CM

## 2023-10-30 DIAGNOSIS — E66.01 CLASS 2 SEVERE OBESITY DUE TO EXCESS CALORIES WITH SERIOUS COMORBIDITY AND BODY MASS INDEX (BMI) OF 38.0 TO 38.9 IN ADULT (H): ICD-10-CM

## 2023-10-30 DIAGNOSIS — C56.2 MALIGNANT NEOPLASM OF LEFT OVARY (H): ICD-10-CM

## 2023-10-30 DIAGNOSIS — E11.9 TYPE 2 DIABETES MELLITUS WITHOUT COMPLICATION, WITH LONG-TERM CURRENT USE OF INSULIN (H): Primary | ICD-10-CM

## 2023-10-30 DIAGNOSIS — Z00.00 ANNUAL PHYSICAL EXAM: ICD-10-CM

## 2023-10-30 DIAGNOSIS — Z79.4 TYPE 2 DIABETES MELLITUS WITHOUT COMPLICATION, WITH LONG-TERM CURRENT USE OF INSULIN (H): Primary | ICD-10-CM

## 2023-10-30 DIAGNOSIS — D50.8 IRON DEFICIENCY ANEMIA SECONDARY TO INADEQUATE DIETARY IRON INTAKE: ICD-10-CM

## 2023-10-30 LAB
ANION GAP SERPL CALCULATED.3IONS-SCNC: 10 MMOL/L (ref 7–15)
BUN SERPL-MCNC: 9 MG/DL (ref 6–20)
CALCIUM SERPL-MCNC: 8.7 MG/DL (ref 8.6–10)
CHLORIDE SERPL-SCNC: 104 MMOL/L (ref 98–107)
CREAT SERPL-MCNC: 0.61 MG/DL (ref 0.51–0.95)
DEPRECATED HCO3 PLAS-SCNC: 22 MMOL/L (ref 22–29)
EGFRCR SERPLBLD CKD-EPI 2021: >90 ML/MIN/1.73M2
ERYTHROCYTE [DISTWIDTH] IN BLOOD BY AUTOMATED COUNT: 16.2 % (ref 10–15)
FERRITIN SERPL-MCNC: 21 NG/ML (ref 6–175)
GLUCOSE SERPL-MCNC: 158 MG/DL (ref 70–99)
HBA1C MFR BLD: 6.8 % (ref 0–5.6)
HCT VFR BLD AUTO: 33.9 % (ref 35–47)
HGB BLD-MCNC: 10.4 G/DL (ref 11.7–15.7)
MCH RBC QN AUTO: 23.2 PG (ref 26.5–33)
MCHC RBC AUTO-ENTMCNC: 30.7 G/DL (ref 31.5–36.5)
MCV RBC AUTO: 76 FL (ref 78–100)
PLATELET # BLD AUTO: 297 10E3/UL (ref 150–450)
POTASSIUM SERPL-SCNC: 4 MMOL/L (ref 3.4–5.3)
RBC # BLD AUTO: 4.49 10E6/UL (ref 3.8–5.2)
SODIUM SERPL-SCNC: 136 MMOL/L (ref 135–145)
WBC # BLD AUTO: 8.1 10E3/UL (ref 4–11)

## 2023-10-30 PROCEDURE — 99395 PREV VISIT EST AGE 18-39: CPT | Performed by: NURSE PRACTITIONER

## 2023-10-30 PROCEDURE — 82728 ASSAY OF FERRITIN: CPT | Performed by: NURSE PRACTITIONER

## 2023-10-30 PROCEDURE — 85027 COMPLETE CBC AUTOMATED: CPT | Performed by: NURSE PRACTITIONER

## 2023-10-30 PROCEDURE — 99214 OFFICE O/P EST MOD 30 MIN: CPT | Mod: 25 | Performed by: NURSE PRACTITIONER

## 2023-10-30 PROCEDURE — 99207 PR FOOT EXAM NO CHARGE: CPT | Performed by: NURSE PRACTITIONER

## 2023-10-30 PROCEDURE — 36415 COLL VENOUS BLD VENIPUNCTURE: CPT | Performed by: NURSE PRACTITIONER

## 2023-10-30 PROCEDURE — 80048 BASIC METABOLIC PNL TOTAL CA: CPT | Performed by: NURSE PRACTITIONER

## 2023-10-30 PROCEDURE — 83036 HEMOGLOBIN GLYCOSYLATED A1C: CPT | Performed by: NURSE PRACTITIONER

## 2023-10-30 RX ORDER — METFORMIN HCL 500 MG
1000 TABLET, EXTENDED RELEASE 24 HR ORAL 2 TIMES DAILY WITH MEALS
Qty: 360 TABLET | Refills: 3 | Status: SHIPPED | OUTPATIENT
Start: 2023-10-30 | End: 2024-08-29

## 2023-10-30 RX ORDER — SEMAGLUTIDE 1.34 MG/ML
1 INJECTION, SOLUTION SUBCUTANEOUS
Qty: 12 ML | Refills: 0 | Status: CANCELLED | OUTPATIENT
Start: 2023-10-30

## 2023-10-30 ASSESSMENT — ENCOUNTER SYMPTOMS
BREAST MASS: 0
PALPITATIONS: 0
CHILLS: 0
HEADACHES: 0
MYALGIAS: 0
DIARRHEA: 0
ABDOMINAL PAIN: 0
SORE THROAT: 0
PARESTHESIAS: 0
HEMATURIA: 0
WEAKNESS: 0
SHORTNESS OF BREATH: 0
JOINT SWELLING: 0
COUGH: 0
HEARTBURN: 0
DIZZINESS: 0
EYE PAIN: 0
NAUSEA: 0
CONSTIPATION: 0
ARTHRALGIAS: 0
HEMATOCHEZIA: 0
FEVER: 0
DYSURIA: 0
FREQUENCY: 0
NERVOUS/ANXIOUS: 0

## 2023-10-30 ASSESSMENT — PAIN SCALES - GENERAL: PAINLEVEL: NO PAIN (0)

## 2023-10-30 NOTE — PROGRESS NOTES
SUBJECTIVE:   CC: Francisca is an 32 year old who presents for preventive health visit.       10/30/2023     6:52 AM   Additional Questions   Roomed by Ro REN       Healthy Habits:     Getting at least 3 servings of Calcium per day:  Yes    Bi-annual eye exam:  Yes    Dental care twice a year:  Yes    Sleep apnea or symptoms of sleep apnea:  None    Diet:  Diabetic    Frequency of exercise:  2-3 days/week    Duration of exercise:  30-45 minutes    Taking medications regularly:  No    Barriers to taking medications:  Problems remembering to take them    Medication side effects:  None    Additional concerns today:  No          Social History     Tobacco Use    Smoking status: Never    Smokeless tobacco: Never   Substance Use Topics    Alcohol use: No             10/30/2023     6:49 AM   Alcohol Use   Prescreen: >3 drinks/day or >7 drinks/week? Not Applicable     Reviewed orders with patient.  Reviewed health maintenance and updated orders accordingly - Yes  Labs reviewed in EPIC  BP Readings from Last 3 Encounters:   10/30/23 110/60   10/20/23 118/79   10/19/23 118/78    Wt Readings from Last 3 Encounters:   10/30/23 100.2 kg (221 lb)   10/20/23 98.9 kg (218 lb)   10/19/23 99.3 kg (219 lb)                  Patient Active Problem List   Diagnosis    Left ovarian serous cystadenoma    ACNE    CARDIOVASCULAR SCREENING; LDL GOAL LESS THAN 160    ASCUS with positive high risk HPV cervical    Diabetes mellitus, type 2 (H)    Class 2 obesity    Iron deficiency anemia secondary to inadequate dietary iron intake    Female infertility    PCOS (polycystic ovarian syndrome)    Anovulation    Class 2 severe obesity due to excess calories with serious comorbidity in adult (H)     Past Surgical History:   Procedure Laterality Date    CYSTECTOMY PILONIDAL  05/17/2012    Procedure:CYSTECTOMY PILONIDAL; Pilonidal cystectomy; Surgeon:PHONG STALEY; Location:[facility]    INCISION AND DRAINAGE, ABSCESS, SIMPLE      pilonidal abscess     LAPAROSCOPIC SALPINGOOPHERECTOMY Left 07/01/2006    serous cystadenoma, Dr. Albert Razo       Social History     Tobacco Use    Smoking status: Never    Smokeless tobacco: Never   Substance Use Topics    Alcohol use: No     Family History   Problem Relation Age of Onset    Diabetes Father     Hypertension Father     Cardiovascular Maternal Grandfather         MI 42    Diabetes Paternal Grandmother     Hypertension Paternal Grandmother          Current Outpatient Medications   Medication Sig Dispense Refill    blood glucose (ACCU-CHEK GUIDE) test strip Use to test blood sugar 1 times daily or as directed. 100 each 11    blood glucose monitoring (ACCU-CHEK FASTCLIX) lancets Use to test blood sugar 1 times daily or as directed. 102 each 11    ferrous sulfate (FEROSUL) 325 (65 Fe) MG tablet Take 1 tablet (325 mg) by mouth daily (with breakfast) 90 tablet 3    letrozole (FEMARA) 2.5 MG tablet Take 2 tablets (5 mg) by mouth daily 10 tablet 0    metFORMIN (GLUCOPHAGE XR) 500 MG 24 hr tablet Take 2 tablets (1,000 mg) by mouth 2 times daily (with meals) 360 tablet 3    Prenatal Vit-Fe Fumarate-FA (PRENATAL MULTIVITAMIN  PLUS IRON) 27-1 MG TABS Take by mouth daily      Semaglutide, 2 MG/DOSE, (OZEMPIC) 8 MG/3ML pen Inject 2 mg Subcutaneous every 7 days 9 mL 1     Allergies   Allergen Reactions    Nkda [No Known Drug Allergy]      Recent Labs   Lab Test 04/25/23  0721 12/23/22  1529 09/12/22  0738 06/14/22  0740 03/21/22  0754 03/21/22  0738 10/11/21  1642 07/13/21  0734 03/30/21  1055 10/26/20  0737 03/13/20  1211 11/19/19  0756 07/23/19  0827 06/09/19  0826 06/06/19  1223   A1C 6.6* 6.5* 7.2*   < >  --    < >  --    < > 9.6* 9.3* 8.2*   < > 7.9*  --   --    LDL 66  --   --   --  81  --   --   --   --  70  --   --  110*  --    < >   HDL 41*  --   --   --  34*  --   --   --   --  40*  --   --  43*  --    < >   TRIG 75  --   --   --  182*  --   --   --   --  206*  --   --  152*  --    < >   ALT  --   --   --   --   --   --   --    --   --   --  61*  --   --  69*  --    CR 0.66  --   --   --  0.74  --   --   --  0.66  --  0.62  --   --  0.59  --    GFRESTIMATED >90  --   --   --  >90  --   --   --  >90  --  >90  --   --  >90  --    GFRESTBLACK  --   --   --   --   --   --   --   --  >90  --  >90  --   --  >90  --    POTASSIUM 4.3  --   --   --  3.5  --   --   --  3.5  --  4.1  --   --  4.0  --    TSH  --   --   --   --   --   --  1.96  --   --   --   --   --  3.23  --   --     < > = values in this interval not displayed.        Breast Cancer Screenin/24/2023     9:59 AM   Breast CA Risk Assessment (FHS-7)   Do you have a family history of breast, colon, or ovarian cancer? No / Unknown       click delete button to remove this line now  Patient under 40 years of age: Routine Mammogram Screening not recommended.   Pertinent mammograms are reviewed under the imaging tab.    History of abnormal Pap smear: NO - age 30- 65 PAP every 3 years recommended      Latest Ref Rng & Units 2022    11:08 AM 2021     1:53 PM 10/1/2019    10:15 AM   PAP / HPV   PAP  Negative for Intraepithelial Lesion or Malignancy (NILM)  Negative for Intraepithelial Lesion or Malignancy (NILM)     PAP (Historical)    NIL    HPV 16 DNA Negative Negative  Negative     HPV 18 DNA Negative Negative  Negative     Other HR HPV Negative Negative  Negative       Reviewed and updated as needed this visit by clinical staff                  Reviewed and updated as needed this visit by Provider                 Past Medical History:   Diagnosis Date    ASCUS with positive high risk HPV 2015    1/15/2015:Pap--ASCUS, +HR HPV. Plan [place]-     Cervical high risk HPV (human papillomavirus) test positive     see problem list    Diabetes (H)     H/O colposcopy with cervical biopsy 2015    atypia    Pilonidal abscess 05/10/2010      Past Surgical History:   Procedure Laterality Date    CYSTECTOMY PILONIDAL  2012    Procedure:CYSTECTOMY PILONIDAL; Pilonidal  "cystectomy; Surgeon:PHONG STALEY; Location:[facility]    INCISION AND DRAINAGE, ABSCESS, SIMPLE      pilonidal abscess    LAPAROSCOPIC SALPINGOOPHERECTOMY Left 2006    serous cystadenoma, Dr. Albert Razo     OB History    Para Term  AB Living   0 0 0 0 0 0   SAB IAB Ectopic Multiple Live Births   0 0 0 0 0       Review of Systems   Constitutional:  Negative for chills and fever.   HENT:  Negative for congestion, ear pain, hearing loss and sore throat.    Eyes:  Negative for pain and visual disturbance.   Respiratory:  Negative for cough and shortness of breath.    Cardiovascular:  Negative for chest pain, palpitations and peripheral edema.   Gastrointestinal:  Negative for abdominal pain, constipation, diarrhea, heartburn, hematochezia and nausea.   Breasts:  Negative for tenderness, breast mass and discharge.   Genitourinary:  Negative for dysuria, frequency, genital sores, hematuria, pelvic pain, urgency, vaginal bleeding and vaginal discharge.   Musculoskeletal:  Negative for arthralgias, joint swelling and myalgias.   Skin:  Negative for rash.   Neurological:  Negative for dizziness, weakness, headaches and paresthesias.   Psychiatric/Behavioral:  Negative for mood changes. The patient is not nervous/anxious.           OBJECTIVE:   LMP 10/06/2023 (Exact Date) Vital signs:  Temp: 98.9  F (37.2  C) Temp src: Tympanic BP: 110/60 Pulse: 87   Resp: 16 SpO2: 100 %     Height: 161.3 cm (5' 3.5\") Weight: 100.2 kg (221 lb)  Estimated body mass index is 38.53 kg/m  as calculated from the following:    Height as of this encounter: 1.613 m (5' 3.5\").    Weight as of this encounter: 100.2 kg (221 lb).        Physical Exam  GENERAL: healthy, alert and no distress  EYES: Eyes grossly normal to inspection, PERRL and conjunctivae and sclerae normal  HENT: ear canals and TM's normal, nose and mouth without ulcers or lesions  NECK: no adenopathy, no asymmetry, masses, or scars and thyroid normal to palpation  RESP: " lungs clear to auscultation - no rales, rhonchi or wheezes  BREAST: normal without masses, tenderness or nipple discharge and no palpable axillary masses or adenopathy  CV: regular rate and rhythm, normal S1 S2, no S3 or S4, no murmur, click or rub, no peripheral edema and peripheral pulses strong  ABDOMEN: soft, nontender, no hepatosplenomegaly, no masses and bowel sounds normal  MS: no gross musculoskeletal defects noted, no edema  SKIN: no suspicious lesions or rashes  NEURO: Normal strength and tone, mentation intact and speech normal  PSYCH: mentation appears normal, affect normal/bright    Results for orders placed or performed in visit on 10/30/23   CBC with platelets     Status: Abnormal   Result Value Ref Range    WBC Count 8.1 4.0 - 11.0 10e3/uL    RBC Count 4.49 3.80 - 5.20 10e6/uL    Hemoglobin 10.4 (L) 11.7 - 15.7 g/dL    Hematocrit 33.9 (L) 35.0 - 47.0 %    MCV 76 (L) 78 - 100 fL    MCH 23.2 (L) 26.5 - 33.0 pg    MCHC 30.7 (L) 31.5 - 36.5 g/dL    RDW 16.2 (H) 10.0 - 15.0 %    Platelet Count 297 150 - 450 10e3/uL   HEMOGLOBIN A1C     Status: Abnormal   Result Value Ref Range    Hemoglobin A1C 6.8 (H) 0.0 - 5.6 %         ASSESSMENT/PLAN:       ICD-10-CM    1. Routine general medical examination at a health care facility  Z00.00 CBC with platelets     Ferritin     Basic metabolic panel  (Ca, Cl, CO2, Creat, Gluc, K, Na, BUN)     Basic metabolic panel  (Ca, Cl, CO2, Creat, Gluc, K, Na, BUN)     Ferritin     CBC with platelets      2. Annual physical exam  Z00.00       3. Type 2 diabetes mellitus without complication, without long-term current use of insulin (H)  E11.9 Adult Eye  Referral     HEMOGLOBIN A1C     metFORMIN (GLUCOPHAGE XR) 500 MG 24 hr tablet     FOOT EXAM     Semaglutide, 2 MG/DOSE, (OZEMPIC) 8 MG/3ML pen     HEMOGLOBIN A1C     OFFICE/OUTPT VISIT,EST,LEVL IV      4. Type 2 diabetes mellitus without complication, with long-term current use of insulin (H)  E11.9 metFORMIN (GLUCOPHAGE  "XR) 500 MG 24 hr tablet    Z79.4 OFFICE/OUTPT VISIT,EST,LEVL IV      5. Class 2 severe obesity due to excess calories with serious comorbidity and body mass index (BMI) of 38.0 to 38.9 in adult (H)  E66.01 OFFICE/OUTPT VISIT,EST,LEVL IV    Z68.38           Patient has been advised of split billing requirements and indicates understanding: Yes      COUNSELING:  Reviewed preventive health counseling, as reflected in patient instructions       Regular exercise       Healthy diet/nutrition       Vision screening       Hearing screening       Aspirin prophylaxis       Alcohol Use       Contraception       Family planning       Folic Acid       Osteoporosis prevention/bone health      BMI:   Estimated body mass index is 37.42 kg/m  as calculated from the following:    Height as of 10/20/23: 1.626 m (5' 4\").    Weight as of 10/20/23: 98.9 kg (218 lb).   Weight management plan: Discussed healthy diet and exercise guidelines      She reports that she has never smoked. She has never used smokeless tobacco.          PARAMJIT Martell CNP  [facility]  "

## 2023-11-20 ENCOUNTER — OFFICE VISIT (OUTPATIENT)
Dept: OBGYN | Facility: CLINIC | Age: 33
End: 2023-11-20
Payer: COMMERCIAL

## 2023-11-20 VITALS
HEIGHT: 63 IN | RESPIRATION RATE: 18 BRPM | DIASTOLIC BLOOD PRESSURE: 75 MMHG | WEIGHT: 220 LBS | HEART RATE: 88 BPM | BODY MASS INDEX: 38.98 KG/M2 | TEMPERATURE: 98.5 F | SYSTOLIC BLOOD PRESSURE: 117 MMHG

## 2023-11-20 DIAGNOSIS — N97.0 ANOVULATION: ICD-10-CM

## 2023-11-20 DIAGNOSIS — N97.9 FEMALE INFERTILITY: ICD-10-CM

## 2023-11-20 DIAGNOSIS — E28.2 PCOS (POLYCYSTIC OVARIAN SYNDROME): Primary | ICD-10-CM

## 2023-11-20 PROCEDURE — 99213 OFFICE O/P EST LOW 20 MIN: CPT | Mod: 25 | Performed by: OBSTETRICS & GYNECOLOGY

## 2023-11-20 PROCEDURE — 76830 TRANSVAGINAL US NON-OB: CPT | Performed by: OBSTETRICS & GYNECOLOGY

## 2023-11-20 RX ORDER — LETROZOLE 2.5 MG/1
7.5 TABLET, FILM COATED ORAL DAILY
Qty: 15 TABLET | Refills: 3 | Status: SHIPPED | OUTPATIENT
Start: 2023-11-20 | End: 2023-12-26

## 2023-11-20 NOTE — NURSING NOTE
"Initial /75 (BP Location: Left arm, Patient Position: Chair, Cuff Size: Adult Regular)   Pulse 88   Temp 98.5  F (36.9  C) (Tympanic)   Resp 18   Ht 1.6 m (5' 3\")   Wt 99.8 kg (220 lb)   LMP 11/06/2023 (Exact Date)   BMI 38.97 kg/m   Estimated body mass index is 38.97 kg/m  as calculated from the following:    Height as of this encounter: 1.6 m (5' 3\").    Weight as of this encounter: 99.8 kg (220 lb). .    "

## 2023-11-20 NOTE — PROGRESS NOTES
[facility]    Follicular Ultrasound Note    Leigh Foreman  1990  [id number]    Indications:    Leigh Foreman is a 32 year old year old female, who is here for follicular ultrasound. She is on cycle day #15. Ovulation induction with Letrozole 5mg.     Cycle #1 Letrozole 5mg, trigger 1/5, timed intercourse, 1/13 prog 6.2  Cycle #2 Letrozole 5mg, trigger 3/10, prog 20  Cycle #3 Letrozole 5mg, trigger 4/10, IUI 4/11, 4/17 prog 12.8  Cycle #4 Letrozole 5mg, IUI 5/12, 5/18 prog 13.2     No fertility cycles over the summer     Cycle #5 Letrozole 5mg, IUI 9/19, 9/25, prog 12.2  Cycle #6 Letrozole 5mg, trigger 10/19, IUI 10/20, prog 9.3  Cycle #7 Letrozole 5mg, Current cycle    Bedside Ultrasound:    Transvaginal ultrasound was performed. Uterus is anteverted. Endometrium measuring 6.6mm. Left ovary surgically absent. Right ovary with two follicles measuring 14mm and 15mm.     Plan:    Suboptimal response this cycle.  No dominant follicle seen today. Will suspend clinical monitoring this cycle due to clinic being closed for upcoming holiday and already being in extended cycle. Plan to do OPK's at home with timed intercourse this week.  Will increase letrozole dose for next cycle if no conception.  Prescription for letrozole 7.5 mg sent.  Discussed increased risk of multiple follicles with increased dose, will continue to plan to do follicular ultrasounds for monitoring.    Patient has been counseled on the option for BLAIR and IVF referral due to unsuccessful fertility attempts thus far.    Keiry Mason DO

## 2023-12-26 ENCOUNTER — OFFICE VISIT (OUTPATIENT)
Dept: OBGYN | Facility: CLINIC | Age: 33
End: 2023-12-26
Payer: COMMERCIAL

## 2023-12-26 VITALS
SYSTOLIC BLOOD PRESSURE: 117 MMHG | WEIGHT: 216.1 LBS | DIASTOLIC BLOOD PRESSURE: 81 MMHG | HEART RATE: 94 BPM | HEIGHT: 62 IN | TEMPERATURE: 98.1 F | BODY MASS INDEX: 39.77 KG/M2 | RESPIRATION RATE: 18 BRPM

## 2023-12-26 DIAGNOSIS — N97.9 FEMALE INFERTILITY: Primary | ICD-10-CM

## 2023-12-26 PROCEDURE — 99213 OFFICE O/P EST LOW 20 MIN: CPT | Mod: 25 | Performed by: OBSTETRICS & GYNECOLOGY

## 2023-12-26 PROCEDURE — 76830 TRANSVAGINAL US NON-OB: CPT | Performed by: OBSTETRICS & GYNECOLOGY

## 2023-12-26 NOTE — PROGRESS NOTES
"Initial /81 (BP Location: Left arm, Patient Position: Chair, Cuff Size: Adult Large)   Pulse 94   Temp 98.1  F (36.7  C) (Tympanic)   Resp 18   Ht 1.575 m (5' 2\")   Wt 98 kg (216 lb 1.6 oz)   LMP 12/11/2023 (Exact Date)   BMI 39.53 kg/m   Estimated body mass index is 39.53 kg/m  as calculated from the following:    Height as of this encounter: 1.575 m (5' 2\").    Weight as of this encounter: 98 kg (216 lb 1.6 oz). .    "

## 2023-12-27 NOTE — PROGRESS NOTES
[facility]     Follicular Ultrasound Note     Leigh Foreman  1990  [id number]     Indications:     Leigh Foreman is a 32 year old year old female, who is here for follicular ultrasound. She is on cycle day #16. Ovulation induction with Letrozole 7.5mg.      Cycle #1 Letrozole 5mg, trigger 1/5, timed intercourse, 1/13 prog 6.2  Cycle #2 Letrozole 5mg, trigger 3/10, prog 20  Cycle #3 Letrozole 5mg, trigger 4/10, IUI 4/11, 4/17 prog 12.8  Cycle #4 Letrozole 5mg, IUI 5/12, 5/18 prog 13.2     No fertility cycles over the summer     Cycle #5 Letrozole 5mg, IUI 9/19, 9/25, prog 12.2  Cycle #6 Letrozole 5mg, trigger 10/19, IUI 10/20, prog 9.3  Cycle #7 Letrozole 5mg, no progesterone level   Cycle #8 75.mg letrazole      Bedside Ultrasound:     Transvaginal ultrasound was performed. Uterus is anteverted. Endometrium measuring 1.2cm. Left ovary surgically absent. Right ovary with no follicles, but moderate free fluid in the pelvis        A/P:   34 yo G0 with primary infertility, PCOS/anovulation.   No follicles are present on TVUS today, but ovulation may have already occurred with the mod free fluid I see.   I did recommend a cycle day #21 progesterone level to confirm. Recommended intercourse at home today.   I recommended she present on cycle day #10 for follicular US in the future as we may have missed ovulation here.     Norma Higginbotham MD  OB/GYN

## 2024-01-02 ENCOUNTER — LAB (OUTPATIENT)
Dept: LAB | Facility: CLINIC | Age: 34
End: 2024-01-02
Payer: COMMERCIAL

## 2024-01-02 DIAGNOSIS — E28.2 PCOS (POLYCYSTIC OVARIAN SYNDROME): ICD-10-CM

## 2024-01-02 DIAGNOSIS — N97.0 ANOVULATION: ICD-10-CM

## 2024-01-02 LAB — PROGEST SERPL-MCNC: 18.9 NG/ML

## 2024-01-02 PROCEDURE — 36415 COLL VENOUS BLD VENIPUNCTURE: CPT

## 2024-01-02 PROCEDURE — 84144 ASSAY OF PROGESTERONE: CPT

## 2024-01-09 ENCOUNTER — MYC MEDICAL ADVICE (OUTPATIENT)
Dept: OBGYN | Facility: CLINIC | Age: 34
End: 2024-01-09
Payer: COMMERCIAL

## 2024-01-09 DIAGNOSIS — N97.9 FEMALE INFERTILITY: Primary | ICD-10-CM

## 2024-01-09 NOTE — TELEPHONE ENCOUNTER
S-(situation): Pt needing follicle ultrasound on 1/18/24 per KG     B-(background): G0; CD1 1/9/24   IUI cycle #8 5mg letrozole    A-(assessment): Pt with CD1 on 1/9/24 and will need follicle ultrasound on 1/18/24 per Dr. Higginbotham recommendation from previous cycle  Appt was scheduled at 2pm on 1/18/24 by RN as accidental double book    R-(recommendations): Routing to Dr. Mason to see if this booking is ok or if appointment should be moved earlier or later in the day.  Or, would you prefer that pt be seen in radiology for imaging?    Thanks,     Fatmata RN  Ob/Gyn Clinic

## 2024-01-10 ENCOUNTER — MYC REFILL (OUTPATIENT)
Dept: FAMILY MEDICINE | Facility: CLINIC | Age: 34
End: 2024-01-10
Payer: COMMERCIAL

## 2024-01-10 ENCOUNTER — TELEPHONE (OUTPATIENT)
Dept: OBGYN | Facility: CLINIC | Age: 34
End: 2024-01-10
Payer: COMMERCIAL

## 2024-01-10 DIAGNOSIS — D50.8 IRON DEFICIENCY ANEMIA SECONDARY TO INADEQUATE DIETARY IRON INTAKE: ICD-10-CM

## 2024-01-10 RX ORDER — LETROZOLE 2.5 MG/1
7.5 TABLET, FILM COATED ORAL DAILY
Qty: 15 TABLET | Refills: 0 | Status: SHIPPED | OUTPATIENT
Start: 2024-01-10 | End: 2024-02-12

## 2024-01-10 RX ORDER — FERROUS SULFATE 325(65) MG
325 TABLET ORAL
Qty: 90 TABLET | Refills: 3 | OUTPATIENT
Start: 2024-01-10

## 2024-01-10 NOTE — CONFIDENTIAL NOTE
[facility] Call Center    Phone Message    May a detailed message be left on voicemail: yes     Reason for Call: Medication Refill Request    Has the patient contacted the pharmacy for the refill? Yes   Name of medication being requested: letrozole (FEMARA) 2.5 MG tablet   Provider who prescribed the medication: Keiry Mason DO   Pharmacy:   [facility] #[id number] [address]     Date medication is needed: asap    Action Taken: Message routed to:  Other: [facility]    Travel Screening: Not Applicable

## 2024-01-10 NOTE — TELEPHONE ENCOUNTER
2pm is fine, or I could do 3:15 if she is wanting later, as long as a procedure room would be available.    Thanks,  Keiry Mason, DO

## 2024-01-10 NOTE — TELEPHONE ENCOUNTER
See [employer] message from 1/9/2024.    Order  letrozole (FEMARA) 2.5 MG tablet [[id number]] (Order [id number])  Order Information    Ordered Status Priority Ordering User Department   01/10/24 Sent (none) Madison Jamison RN [facility] [facility]     Order History  Outpatient  Date/Time Action Taken User Additional Information   01/10/24 1101 Sign Madison Jamison RN Ordering Mode: Verbal with readback     Outpatient Medication Detail     Disp Refills Start End ARY   letrozole (FEMARA) 2.5 MG tablet 15 tablet 0 1/10/2024 1/15/2024 No   Sig - Route: Take 3 tablets (7.5 mg) by mouth daily for 5 days - Oral   Sent to pharmacy as: Letrozole 2.5 MG Oral Tablet (FEMARA)   Class: E-Prescribe   Notes to Pharmacy: Take on days 3-7 of cycle. Three tablets daily.   Order: [id number]   E-Prescribing Status: Receipt confirmed by pharmacy (1/10/2024 11:01 AM CST)       Already addressed.    Closing encounter.    Berenice Nicole RN on 1/10/2024 at 11:06 AM

## 2024-01-14 ENCOUNTER — TRANSFERRED RECORDS (OUTPATIENT)
Dept: MULTI SPECIALTY CLINIC | Facility: CLINIC | Age: 34
End: 2024-01-14

## 2024-01-14 LAB — RETINOPATHY: NORMAL

## 2024-01-15 ENCOUNTER — OFFICE VISIT (OUTPATIENT)
Dept: FAMILY MEDICINE | Facility: CLINIC | Age: 34
End: 2024-01-15
Payer: COMMERCIAL

## 2024-01-15 VITALS
RESPIRATION RATE: 20 BRPM | BODY MASS INDEX: 39.56 KG/M2 | OXYGEN SATURATION: 99 % | DIASTOLIC BLOOD PRESSURE: 80 MMHG | WEIGHT: 215 LBS | HEIGHT: 62 IN | SYSTOLIC BLOOD PRESSURE: 110 MMHG | HEART RATE: 68 BPM | TEMPERATURE: 98.6 F

## 2024-01-15 DIAGNOSIS — E66.01 CLASS 2 SEVERE OBESITY DUE TO EXCESS CALORIES WITH SERIOUS COMORBIDITY AND BODY MASS INDEX (BMI) OF 38.0 TO 38.9 IN ADULT (H): ICD-10-CM

## 2024-01-15 DIAGNOSIS — E11.9 TYPE 2 DIABETES MELLITUS WITHOUT COMPLICATION, WITHOUT LONG-TERM CURRENT USE OF INSULIN (H): Primary | ICD-10-CM

## 2024-01-15 DIAGNOSIS — E66.812 CLASS 2 SEVERE OBESITY DUE TO EXCESS CALORIES WITH SERIOUS COMORBIDITY AND BODY MASS INDEX (BMI) OF 38.0 TO 38.9 IN ADULT (H): ICD-10-CM

## 2024-01-15 DIAGNOSIS — D50.8 IRON DEFICIENCY ANEMIA SECONDARY TO INADEQUATE DIETARY IRON INTAKE: ICD-10-CM

## 2024-01-15 LAB
BASOPHILS # BLD AUTO: 0 10E3/UL (ref 0–0.2)
BASOPHILS NFR BLD AUTO: 0 %
EOSINOPHIL # BLD AUTO: 0.3 10E3/UL (ref 0–0.7)
EOSINOPHIL NFR BLD AUTO: 4 %
ERYTHROCYTE [DISTWIDTH] IN BLOOD BY AUTOMATED COUNT: 16.3 % (ref 10–15)
FERRITIN SERPL-MCNC: 17 NG/ML (ref 6–175)
HCT VFR BLD AUTO: 41 % (ref 35–47)
HGB BLD-MCNC: 13.3 G/DL (ref 11.7–15.7)
IMM GRANULOCYTES # BLD: 0 10E3/UL
IMM GRANULOCYTES NFR BLD: 0 %
IRON BINDING CAPACITY (ROCHE): 352 UG/DL (ref 240–430)
IRON SATN MFR SERPL: 15 % (ref 15–46)
IRON SERPL-MCNC: 52 UG/DL (ref 37–145)
LYMPHOCYTES # BLD AUTO: 2.2 10E3/UL (ref 0.8–5.3)
LYMPHOCYTES NFR BLD AUTO: 31 %
MCH RBC QN AUTO: 26 PG (ref 26.5–33)
MCHC RBC AUTO-ENTMCNC: 32.4 G/DL (ref 31.5–36.5)
MCV RBC AUTO: 80 FL (ref 78–100)
MONOCYTES # BLD AUTO: 0.5 10E3/UL (ref 0–1.3)
MONOCYTES NFR BLD AUTO: 7 %
NEUTROPHILS # BLD AUTO: 4.2 10E3/UL (ref 1.6–8.3)
NEUTROPHILS NFR BLD AUTO: 59 %
PLATELET # BLD AUTO: 303 10E3/UL (ref 150–450)
RBC # BLD AUTO: 5.12 10E6/UL (ref 3.8–5.2)
RETICS # AUTO: 0.07 10E6/UL (ref 0.03–0.1)
RETICS/RBC NFR AUTO: 1.4 % (ref 0.5–2)
WBC # BLD AUTO: 7.1 10E3/UL (ref 4–11)

## 2024-01-15 PROCEDURE — 83540 ASSAY OF IRON: CPT | Performed by: NURSE PRACTITIONER

## 2024-01-15 PROCEDURE — 85045 AUTOMATED RETICULOCYTE COUNT: CPT | Performed by: NURSE PRACTITIONER

## 2024-01-15 PROCEDURE — 83550 IRON BINDING TEST: CPT | Performed by: NURSE PRACTITIONER

## 2024-01-15 PROCEDURE — 36415 COLL VENOUS BLD VENIPUNCTURE: CPT | Performed by: NURSE PRACTITIONER

## 2024-01-15 PROCEDURE — 85025 COMPLETE CBC W/AUTO DIFF WBC: CPT | Performed by: NURSE PRACTITIONER

## 2024-01-15 PROCEDURE — 99214 OFFICE O/P EST MOD 30 MIN: CPT | Performed by: NURSE PRACTITIONER

## 2024-01-15 PROCEDURE — 82728 ASSAY OF FERRITIN: CPT | Performed by: NURSE PRACTITIONER

## 2024-01-15 RX ORDER — FERROUS SULFATE 325(65) MG
325 TABLET ORAL
Qty: 90 TABLET | Refills: 3 | Status: CANCELLED | OUTPATIENT
Start: 2024-01-15

## 2024-01-15 RX ORDER — FERROUS SULFATE 325(65) MG
325 TABLET ORAL
Qty: 90 TABLET | Refills: 3 | Status: SHIPPED | OUTPATIENT
Start: 2024-01-15

## 2024-01-15 ASSESSMENT — PAIN SCALES - GENERAL: PAINLEVEL: NO PAIN (0)

## 2024-01-15 NOTE — PROGRESS NOTES
"  Assessment & Plan     Type 2 diabetes mellitus without complication, without long-term current use of insulin (H)  Stable     Iron deficiency anemia secondary to inadequate dietary iron intake  Stable labs pending   - ferrous sulfate (FEROSUL) 325 (65 Fe) MG tablet  Dispense: 90 tablet; Refill: 3  - Iron and iron binding capacity  - Iron and iron binding capacity  - Lab Blood Morphology Pathologist Review  - **Ferritin FUTURE 2mo    Class 2 severe obesity due to excess calories with serious comorbidity and body mass index (BMI) of 38.0 to 38.9 in adult (H)  Reviewed diet and working out     PARAMJIT Martell CNP [facility]    Bob Espinosa is a 33 year old, presenting for the following health issues:  Anemia (Check Iron Levels. Peripheal smear?)        1/15/2024     7:14 AM   Additional Questions   Roomed by Wanda YOUNG       History of Present Illness       Reason for visit:  Iron level check    She eats 2-3 servings of fruits and vegetables daily.She consumes 1 sweetened beverage(s) daily.She exercises with enough effort to increase her heart rate 30 to 60 minutes per day.  She exercises with enough effort to increase her heart rate 4 days per week.   She is taking medications regularly.       Chief Complaint   Patient presents with    Anemia     Check Iron Levels. Peripheal smear?             Review of Systems   Constitutional, HEENT, cardiovascular, pulmonary, gi and gu systems are negative, except as otherwise noted.      Objective    /80   Pulse 68   Temp 98.6  F (37  C) (Tympanic)   Resp 20   Ht 1.575 m (5' 2\")   Wt 97.5 kg (215 lb)   LMP 01/09/2024 (Exact Date)   SpO2 99%   BMI 39.32 kg/m    Body mass index is 39.32 kg/m .  Physical Exam   GENERAL: healthy, alert and no distress  EYES: Eyes grossly normal to inspection, PERRL and conjunctivae and sclerae normal  RESP: lungs clear to auscultation - no rales, rhonchi or wheezes  CV: regular rate and rhythm, normal " S1 S2, no S3 or S4, no murmur, click or rub, no peripheral edema and peripheral pulses strong  MS: no gross musculoskeletal defects noted, no edema  SKIN: no suspicious lesions or rashes  NEURO: Normal strength and tone, mentation intact and speech normal  PSYCH: mentation appears normal, affect normal/bright    Results for orders placed or performed in visit on 01/15/24   CBC with platelets and differential     Status: Abnormal   Result Value Ref Range    WBC Count 7.1 4.0 - 11.0 10e3/uL    RBC Count 5.12 3.80 - 5.20 10e6/uL    Hemoglobin 13.3 11.7 - 15.7 g/dL    Hematocrit 41.0 35.0 - 47.0 %    MCV 80 78 - 100 fL    MCH 26.0 (L) 26.5 - 33.0 pg    MCHC 32.4 31.5 - 36.5 g/dL    RDW 16.3 (H) 10.0 - 15.0 %    Platelet Count 303 150 - 450 10e3/uL    % Neutrophils 59 %    % Lymphocytes 31 %    % Monocytes 7 %    % Eosinophils 4 %    % Basophils 0 %    % Immature Granulocytes 0 %    Absolute Neutrophils 4.2 1.6 - 8.3 10e3/uL    Absolute Lymphocytes 2.2 0.8 - 5.3 10e3/uL    Absolute Monocytes 0.5 0.0 - 1.3 10e3/uL    Absolute Eosinophils 0.3 0.0 - 0.7 10e3/uL    Absolute Basophils 0.0 0.0 - 0.2 10e3/uL    Absolute Immature Granulocytes 0.0 <=0.4 10e3/uL   Lab Blood Morphology Pathologist Review     Status: Abnormal (In process)    Narrative    The following orders were created for panel order Lab Blood Morphology Pathologist Review.  Procedure                               Abnormality         Status                     ---------                               -----------         ------                     Bld morphology pathology...[[id number]]                      In process                 CBC with platelets and d...[[id number]]  Abnormal            Final result               Reticulocyte count[[id number]]                               In process                 Morphology Tracking[[id number]]                              Final result                 Please view results for these tests on the individual orders.

## 2024-01-16 LAB
PATH REPORT.COMMENTS IMP SPEC: NORMAL
PATH REPORT.COMMENTS IMP SPEC: NORMAL
PATH REPORT.FINAL DX SPEC: NORMAL
PATH REPORT.MICROSCOPIC SPEC OTHER STN: NORMAL
PATH REPORT.MICROSCOPIC SPEC OTHER STN: NORMAL
PATH REPORT.RELEVANT HX SPEC: NORMAL

## 2024-01-18 ENCOUNTER — OFFICE VISIT (OUTPATIENT)
Dept: OBGYN | Facility: CLINIC | Age: 34
End: 2024-01-18
Payer: COMMERCIAL

## 2024-01-18 VITALS
HEIGHT: 63 IN | DIASTOLIC BLOOD PRESSURE: 78 MMHG | TEMPERATURE: 99.3 F | BODY MASS INDEX: 37.95 KG/M2 | WEIGHT: 214.2 LBS | HEART RATE: 98 BPM | SYSTOLIC BLOOD PRESSURE: 122 MMHG | RESPIRATION RATE: 14 BRPM

## 2024-01-18 DIAGNOSIS — N97.0 ANOVULATION: Primary | ICD-10-CM

## 2024-01-18 PROCEDURE — 99212 OFFICE O/P EST SF 10 MIN: CPT | Mod: 25 | Performed by: OBSTETRICS & GYNECOLOGY

## 2024-01-18 PROCEDURE — 76830 TRANSVAGINAL US NON-OB: CPT | Performed by: OBSTETRICS & GYNECOLOGY

## 2024-01-18 RX ORDER — CHORIONIC GONADOTROPIN 10000 UNIT
10000 KIT INTRAMUSCULAR ONCE
Status: CANCELLED | OUTPATIENT
Start: 2024-01-18 | End: 2024-01-18

## 2024-01-18 NOTE — PROGRESS NOTES
[facility]    Follicular Ultrasound Note    Leigh Foreman  1990  [id number]    Indications:    Leigh Foreman is a 33 year old year old female, who is here for follicular ultrasound. She is on cycle day #10. Ovulation induction with Letrozole 7.5mg.     Cycle #1 Letrozole 5mg, trigger 1/5, timed intercourse, 1/13 prog 6.2  Cycle #2 Letrozole 5mg, trigger 3/10, prog 20  Cycle #3 Letrozole 5mg, trigger 4/10, IUI 4/11, 4/17 prog 12.8  Cycle #4 Letrozole 5mg, IUI 5/12, 5/18 prog 13.2     No fertility cycles over the summer     Cycle #5 Letrozole 5mg, IUI 9/19, 9/25, prog 12.2  Cycle #6 Letrozole 5mg, trigger 10/19, IUI 10/20, prog 9.3  Cycle #7 Letrozole 5mg, no progesterone level   Cycle #8 Letrozole 7.5mg, follicle US late (missed ovulation), prog 18.9  Cycle #9 Letrozole 7.5mg, CURRENT cycle    Bedside Ultrasound:    Transvaginal ultrasound was performed. Uterus is anteverted. Endometrium measuring 2.6mm. Left ovary surgically absent. Right ovary without a dominant follicle, multiple small follicles, largest measuring 12mm.     Plan:    No dominant follicle today, might just be too early in cycle. Follow up in 4 days for repeat follicular US.      Patient has been counseled on the option for BLAIR and IVF referral due to unsuccessful fertility attempts thus far, readdressed today.     Keiry Mason DO

## 2024-01-18 NOTE — NURSING NOTE
"Initial /78 (BP Location: Left arm, Patient Position: Sitting, Cuff Size: Adult Regular)   Pulse 98   Temp 99.3  F (37.4  C) (Tympanic)   Resp 14   Ht 1.6 m (5' 3\")   Wt 97.2 kg (214 lb 3.2 oz)   LMP 01/09/2024 (Exact Date)   BMI 37.94 kg/m   Estimated body mass index is 37.94 kg/m  as calculated from the following:    Height as of this encounter: 1.6 m (5' 3\").    Weight as of this encounter: 97.2 kg (214 lb 3.2 oz). .    "

## 2024-01-22 ENCOUNTER — OFFICE VISIT (OUTPATIENT)
Dept: OBGYN | Facility: CLINIC | Age: 34
End: 2024-01-22
Payer: COMMERCIAL

## 2024-01-22 VITALS
DIASTOLIC BLOOD PRESSURE: 76 MMHG | WEIGHT: 212.5 LBS | TEMPERATURE: 98.4 F | HEART RATE: 96 BPM | RESPIRATION RATE: 18 BRPM | SYSTOLIC BLOOD PRESSURE: 113 MMHG | BODY MASS INDEX: 37.64 KG/M2

## 2024-01-22 DIAGNOSIS — E28.2 PCOS (POLYCYSTIC OVARIAN SYNDROME): ICD-10-CM

## 2024-01-22 DIAGNOSIS — N97.9 FEMALE INFERTILITY: Primary | ICD-10-CM

## 2024-01-22 DIAGNOSIS — N97.0 ANOVULATION: ICD-10-CM

## 2024-01-22 PROCEDURE — 96372 THER/PROPH/DIAG INJ SC/IM: CPT | Performed by: OBSTETRICS & GYNECOLOGY

## 2024-01-22 PROCEDURE — 99213 OFFICE O/P EST LOW 20 MIN: CPT | Mod: 25 | Performed by: OBSTETRICS & GYNECOLOGY

## 2024-01-22 RX ORDER — CHORIONIC GONADOTROPIN 10000 UNIT
10000 KIT INTRAMUSCULAR ONCE
Status: COMPLETED | OUTPATIENT
Start: 2024-01-22 | End: 2024-01-22

## 2024-01-22 RX ADMIN — CHORIONIC GONADOTROPIN 10000 UNITS: KIT at 10:11

## 2024-01-22 NOTE — PROGRESS NOTES
"Initial /76 (BP Location: Right arm, Patient Position: Chair, Cuff Size: Adult Large)   Pulse 96   Temp 98.4  F (36.9  C) (Tympanic)   Resp 18   Wt 96.4 kg (212 lb 8 oz)   LMP 01/09/2024 (Exact Date)   BMI 37.64 kg/m   Estimated body mass index is 37.64 kg/m  as calculated from the following:    Height as of 1/18/24: 1.6 m (5' 3\").    Weight as of this encounter: 96.4 kg (212 lb 8 oz). .    "

## 2024-01-22 NOTE — PROGRESS NOTES
[facility]    Follicular Ultrasound Note    Leigh Foreman  1990  [id number]    Indications:    Leigh Foreman is a 33 year old year old female, who is here for follicular ultrasound. She is on cycle day #14. Ovulation induction with Letrozole 7.5mg.     Cycle #1 Letrozole 5mg, trigger 1/5, timed intercourse, 1/13 prog 6.2  Cycle #2 Letrozole 5mg, trigger 3/10, prog 20  Cycle #3 Letrozole 5mg, trigger 4/10, IUI 4/11, 4/17 prog 12.8  Cycle #4 Letrozole 5mg, IUI 5/12, 5/18 prog 13.2     No fertility cycles over the summer     Cycle #5 Letrozole 5mg, IUI 9/19, 9/25, prog 12.2  Cycle #6 Letrozole 5mg, trigger 10/19, IUI 10/20, prog 9.3  Cycle #7 Letrozole 5mg, no progesterone level   Cycle #8 Letrozole 7.5mg, follicle US late (missed ovulation), prog 18.9  Cycle #9 Letrozole 7.5mg, CURRENT cycle      Bedside Ultrasound:    Transvaginal ultrasound was performed. Uterus is anteverted. Endometrium measuring 9mm. Left ovary surgically absent. Right ovary with dominant follicle measuring 19mm x 18mm, additional smaller follicle measuring 16mm x 15mm.     Plan:    Francisca was seen today for fertility.    Diagnoses and all orders for this visit:    Female infertility  -     chorionic gonadotropin (NOVAREL/PREGNYL) injection 10,000 Units    PCOS (polycystic ovarian syndrome)  -     chorionic gonadotropin (NOVAREL/PREGNYL) injection 10,000 Units    Anovulation  -     chorionic gonadotropin (NOVAREL/PREGNYL) injection 10,000 Units  -     US Transvaginal Pelvic Non-OB; Future        Plan trigger shot today and IUI tomorrow.   Progesterone level on cycle day #21.    Chorionic gonadotropin is the standard of care for failed conservative measures of ovulation induction with timed intercourse. Therefore, this is a medically-indicated medication.         Keiry Mason DO

## 2024-01-23 ENCOUNTER — ALLIED HEALTH/NURSE VISIT (OUTPATIENT)
Dept: OBGYN | Facility: CLINIC | Age: 34
End: 2024-01-23
Payer: COMMERCIAL

## 2024-01-23 ENCOUNTER — OFFICE VISIT (OUTPATIENT)
Dept: OBGYN | Facility: CLINIC | Age: 34
End: 2024-01-23
Payer: COMMERCIAL

## 2024-01-23 VITALS
DIASTOLIC BLOOD PRESSURE: 87 MMHG | BODY MASS INDEX: 37.56 KG/M2 | WEIGHT: 212 LBS | SYSTOLIC BLOOD PRESSURE: 135 MMHG | HEART RATE: 109 BPM | HEIGHT: 63 IN | TEMPERATURE: 98.5 F | RESPIRATION RATE: 16 BRPM

## 2024-01-23 DIAGNOSIS — N97.9 FEMALE INFERTILITY: Primary | ICD-10-CM

## 2024-01-23 PROCEDURE — 99207 PR NO CHARGE NURSE ONLY: CPT

## 2024-01-23 PROCEDURE — 58322 ARTIFICIAL INSEMINATION: CPT | Performed by: OBSTETRICS & GYNECOLOGY

## 2024-01-23 NOTE — PROGRESS NOTES
[facility]    IUI Procedure Note    Leigh Foreman  1990  [id number]    Indications:    Leigh Foreman is a 33 year old year old female, who is here for IUI. Informed consent was singed after a discussion of the risks of intrauterine insemination (bleeding, infection, transmission of STIs, uterine cramping).       Procedure:   Semen specimen was prepared per protocol. The patient was placed in dorsal lithotomy position. A speculum was placed in the vagina and the cervix visualized. A specimen catheter labeled with the patient's information was placed through the cervix and the sample was injected. The patient tolerated the procedure well. There were no complications and no blood loss. The patient remained in our clinic with elevated pelvis for 20 min after the insemination. Plan for day #21 progesterone. Plan for pregnancy test with missed menses.     Low volume semen sample again noted today.  Would consider repeat semen analysis if this is an ongoing concern.  Patient and her partner have discussed and plan to do 1 more medicated cycle at the increased dose of letrozole.  Would consider BLAIR referral after that if still unsuccessful.    Keiry Mason, DO

## 2024-01-23 NOTE — PROGRESS NOTES
Patients Significant other came into clinic to leave a sperm sample for patients scheduled IUI appointment with Dr. Mason. Please see office visit note for further documentation.    Nya Kunz on 1/23/2024 at 9:13 AM

## 2024-01-23 NOTE — NURSING NOTE
"Initial /87 (BP Location: Right arm, Patient Position: Sitting, Cuff Size: Adult Large)   Pulse 109   Temp 98.5  F (36.9  C) (Tympanic)   Resp 16   Ht 1.6 m (5' 3\")   Wt 96.2 kg (212 lb)   LMP 01/09/2024 (Exact Date)   BMI 37.55 kg/m   Estimated body mass index is 37.55 kg/m  as calculated from the following:    Height as of this encounter: 1.6 m (5' 3\").    Weight as of this encounter: 96.2 kg (212 lb). .    "

## 2024-01-29 ENCOUNTER — LAB (OUTPATIENT)
Dept: LAB | Facility: CLINIC | Age: 34
End: 2024-01-29
Payer: COMMERCIAL

## 2024-01-29 ENCOUNTER — TELEPHONE (OUTPATIENT)
Dept: FAMILY MEDICINE | Facility: CLINIC | Age: 34
End: 2024-01-29

## 2024-01-29 DIAGNOSIS — E11.9 DIABETES MELLITUS, TYPE 2 (H): Primary | ICD-10-CM

## 2024-01-29 DIAGNOSIS — E28.2 PCOS (POLYCYSTIC OVARIAN SYNDROME): ICD-10-CM

## 2024-01-29 DIAGNOSIS — N97.0 ANOVULATION: ICD-10-CM

## 2024-01-29 LAB — HBA1C MFR BLD: 6.1 % (ref 0–5.6)

## 2024-01-29 PROCEDURE — 84144 ASSAY OF PROGESTERONE: CPT

## 2024-01-29 PROCEDURE — 83036 HEMOGLOBIN GLYCOSYLATED A1C: CPT

## 2024-01-29 PROCEDURE — 36415 COLL VENOUS BLD VENIPUNCTURE: CPT

## 2024-01-30 LAB — PROGEST SERPL-MCNC: 15.2 NG/ML

## 2024-02-10 ENCOUNTER — MYC MEDICAL ADVICE (OUTPATIENT)
Dept: OBGYN | Facility: CLINIC | Age: 34
End: 2024-02-10
Payer: COMMERCIAL

## 2024-02-10 DIAGNOSIS — N97.9 FEMALE INFERTILITY: ICD-10-CM

## 2024-02-12 RX ORDER — LETROZOLE 2.5 MG/1
7.5 TABLET, FILM COATED ORAL DAILY
Qty: 15 TABLET | Refills: 3 | Status: SHIPPED | OUTPATIENT
Start: 2024-02-12 | End: 2024-08-29

## 2024-02-12 NOTE — TELEPHONE ENCOUNTER
Please review and advise.  OK to refill?    Is the plan follicle on cycle day 10?  You may be out of office so Dr. Higginbotham can do it.    Anette SHORT   Ob/Gyn Clinic

## 2024-02-12 NOTE — TELEPHONE ENCOUNTER
In chart review, last follicle US on cycle day 14 with IUI on cycle day 15.    Please advise on recommended appointment days.  Thank you.    Anette SHORT   Ob/Gyn Clinic

## 2024-02-22 ENCOUNTER — OFFICE VISIT (OUTPATIENT)
Dept: OBGYN | Facility: CLINIC | Age: 34
End: 2024-02-22
Payer: COMMERCIAL

## 2024-02-22 VITALS
HEIGHT: 63 IN | BODY MASS INDEX: 37.55 KG/M2 | SYSTOLIC BLOOD PRESSURE: 138 MMHG | TEMPERATURE: 99 F | DIASTOLIC BLOOD PRESSURE: 76 MMHG | RESPIRATION RATE: 18 BRPM | HEART RATE: 112 BPM | WEIGHT: 211.9 LBS

## 2024-02-22 DIAGNOSIS — N97.0 ANOVULATION: ICD-10-CM

## 2024-02-22 DIAGNOSIS — N97.9 FEMALE INFERTILITY: ICD-10-CM

## 2024-02-22 DIAGNOSIS — E28.2 PCOS (POLYCYSTIC OVARIAN SYNDROME): Primary | ICD-10-CM

## 2024-02-22 PROCEDURE — 76830 TRANSVAGINAL US NON-OB: CPT | Performed by: OBSTETRICS & GYNECOLOGY

## 2024-02-22 PROCEDURE — 99213 OFFICE O/P EST LOW 20 MIN: CPT | Mod: 25 | Performed by: OBSTETRICS & GYNECOLOGY

## 2024-02-22 RX ORDER — CHORIONIC GONADOTROPIN 10000 UNIT
10000 KIT INTRAMUSCULAR ONCE
Status: ACTIVE | OUTPATIENT
Start: 2024-02-23

## 2024-02-22 NOTE — PROGRESS NOTES
[facility]    Follicular Ultrasound Note    Leigh Foreman  1990  [id number]    Indications and History:    Leigh Foreman is a 33 year old year old female, who is here for follicular ultrasound. She is on cycle day #13. Ovulation induction with Letrozole 7.5mg.     Cycle #1 Letrozole 5mg, trigger 1/5, timed intercourse, 1/13 prog 6.2  Cycle #2 Letrozole 5mg, trigger 3/10, prog 20  Cycle #3 Letrozole 5mg, trigger 4/10, IUI 4/11, 4/17 prog 12.8  Cycle #4 Letrozole 5mg, IUI 5/12, 5/18 prog 13.2     No fertility cycles over the summer     Cycle #5 Letrozole 5mg, IUI 9/19, 9/25, prog 12.2  Cycle #6 Letrozole 5mg, trigger 10/19, IUI 10/20, prog 9.3  Cycle #7 Letrozole 5mg, no progesterone level   Cycle #8 Letrozole 7.5mg, follicle US late (missed ovulation), prog 18.9  Cycle #9 Letrozole 7.5mg, trigger 1/22, IUI 1/23, 1/29 prog 15.2  Cycle #10 Letrozole 7.5mg, CURRENT CYCLE    Bedside Ultrasound:    Transvaginal ultrasound was performed. Uterus is anteverted. Endometrium measuring 8.3mm. Left ovary is surgically absent. Right ovary with multiple small follicles measuring around 15mm, additional dominant follicle measuring 17.6mm.     Assessment and Plan:    Encounter Diagnoses   Name Primary?    PCOS (polycystic ovarian syndrome) Yes    Anovulation        Plan for trigger shot tomorrow. Then timed intercourse over the weekend.     Chorionic gonadotropin is the standard of care for failed conservative measures of ovulation induction with timed intercourse. Therefore, this is a medically-indicated medication.           Keiry Mason DO

## 2024-02-22 NOTE — NURSING NOTE
"Initial /76 (BP Location: Left arm, Patient Position: Sitting, Cuff Size: Adult Regular)   Pulse 112   Temp 99  F (37.2  C) (Tympanic)   Resp 18   Ht 1.6 m (5' 3\")   Wt 96.1 kg (211 lb 14.4 oz)   LMP 02/11/2024 (Exact Date)   BMI 37.54 kg/m   Estimated body mass index is 37.54 kg/m  as calculated from the following:    Height as of this encounter: 1.6 m (5' 3\").    Weight as of this encounter: 96.1 kg (211 lb 14.4 oz). .    "

## 2024-02-23 ENCOUNTER — ALLIED HEALTH/NURSE VISIT (OUTPATIENT)
Dept: OBGYN | Facility: CLINIC | Age: 34
End: 2024-02-23
Payer: COMMERCIAL

## 2024-02-23 DIAGNOSIS — N97.9 FEMALE INFERTILITY: Primary | ICD-10-CM

## 2024-02-23 PROCEDURE — 99207 PR NO CHARGE NURSE ONLY: CPT

## 2024-02-23 NOTE — PROGRESS NOTES
Clinic Administered Medication Documentation      Injectable Medication Documentation    Is there an active order (written within the past 365 days, with administrations remaining, not ) in the chart? Yes.     Patient was given  Pregnyl 10.000 units  . Prior to medication administration, verified patient's identity using patient s name and date of birth. Please see MAR and medication order for additional information. Patient instructed to remain in clinic for 15 minutes and report any adverse reaction to staff immediately.    Vial/Syringe: Single dose vial. Was entire vial of medication used? Yes  Was this medication supplied by the patient? No  Is this a medication the patient will need to receive again? No - not necessary to check for refills remaining.  Given- left ventrogluteal  Denisse Zhang, CMA

## 2024-03-01 ENCOUNTER — LAB (OUTPATIENT)
Dept: LAB | Facility: CLINIC | Age: 34
End: 2024-03-01
Payer: COMMERCIAL

## 2024-03-01 DIAGNOSIS — N97.0 ANOVULATION: ICD-10-CM

## 2024-03-01 DIAGNOSIS — E28.2 PCOS (POLYCYSTIC OVARIAN SYNDROME): ICD-10-CM

## 2024-03-01 PROCEDURE — 84144 ASSAY OF PROGESTERONE: CPT

## 2024-03-01 PROCEDURE — 36415 COLL VENOUS BLD VENIPUNCTURE: CPT

## 2024-03-02 LAB — PROGEST SERPL-MCNC: 12.3 NG/ML

## 2024-05-12 DIAGNOSIS — E11.9 TYPE 2 DIABETES MELLITUS WITHOUT COMPLICATION, WITHOUT LONG-TERM CURRENT USE OF INSULIN (H): ICD-10-CM

## 2024-05-13 RX ORDER — SEMAGLUTIDE 2.68 MG/ML
INJECTION, SOLUTION SUBCUTANEOUS
Qty: 9 ML | Refills: 0 | Status: SHIPPED | OUTPATIENT
Start: 2024-05-13 | End: 2024-08-12

## 2024-05-13 NOTE — TELEPHONE ENCOUNTER
GFR Estimate   Date Value Ref Range Status   10/30/2023 >90 >60 mL/min/1.73m2 Final   03/30/2021 >90 >60 mL/min/[1.73_m2] Final     Comment:     Non  GFR Calc  Starting 12/18/2018, serum creatinine based estimated GFR (eGFR) will be   calculated using the Chronic Kidney Disease Epidemiology Collaboration   (CKD-EPI) equation.

## 2024-06-16 ENCOUNTER — HEALTH MAINTENANCE LETTER (OUTPATIENT)
Age: 34
End: 2024-06-16

## 2024-08-10 DIAGNOSIS — E11.9 TYPE 2 DIABETES MELLITUS WITHOUT COMPLICATION, WITHOUT LONG-TERM CURRENT USE OF INSULIN (H): ICD-10-CM

## 2024-08-12 RX ORDER — SEMAGLUTIDE 2.68 MG/ML
INJECTION, SOLUTION SUBCUTANEOUS
Qty: 3 ML | Refills: 0 | Status: SHIPPED | OUTPATIENT
Start: 2024-08-12

## 2024-08-29 ENCOUNTER — OFFICE VISIT (OUTPATIENT)
Dept: FAMILY MEDICINE | Facility: CLINIC | Age: 34
End: 2024-08-29
Payer: COMMERCIAL

## 2024-08-29 VITALS
RESPIRATION RATE: 20 BRPM | OXYGEN SATURATION: 100 % | BODY MASS INDEX: 38.07 KG/M2 | SYSTOLIC BLOOD PRESSURE: 114 MMHG | HEART RATE: 96 BPM | TEMPERATURE: 98.3 F | WEIGHT: 223 LBS | HEIGHT: 64 IN | DIASTOLIC BLOOD PRESSURE: 70 MMHG

## 2024-08-29 DIAGNOSIS — E11.9 TYPE 2 DIABETES MELLITUS WITHOUT COMPLICATION, WITH LONG-TERM CURRENT USE OF INSULIN (H): ICD-10-CM

## 2024-08-29 DIAGNOSIS — E11.9 TYPE 2 DIABETES MELLITUS WITHOUT COMPLICATION, WITHOUT LONG-TERM CURRENT USE OF INSULIN (H): Primary | ICD-10-CM

## 2024-08-29 DIAGNOSIS — D50.8 IRON DEFICIENCY ANEMIA SECONDARY TO INADEQUATE DIETARY IRON INTAKE: ICD-10-CM

## 2024-08-29 DIAGNOSIS — Z79.4 TYPE 2 DIABETES MELLITUS WITHOUT COMPLICATION, WITH LONG-TERM CURRENT USE OF INSULIN (H): ICD-10-CM

## 2024-08-29 LAB
ANION GAP SERPL CALCULATED.3IONS-SCNC: 10 MMOL/L (ref 7–15)
BUN SERPL-MCNC: 9 MG/DL (ref 6–20)
CALCIUM SERPL-MCNC: 9 MG/DL (ref 8.8–10.4)
CHLORIDE SERPL-SCNC: 104 MMOL/L (ref 98–107)
CHOLEST SERPL-MCNC: 147 MG/DL
CREAT SERPL-MCNC: 0.76 MG/DL (ref 0.51–0.95)
CREAT UR-MCNC: 292 MG/DL
EGFRCR SERPLBLD CKD-EPI 2021: >90 ML/MIN/1.73M2
ERYTHROCYTE [DISTWIDTH] IN BLOOD BY AUTOMATED COUNT: 15.8 % (ref 10–15)
FASTING STATUS PATIENT QL REPORTED: ABNORMAL
FASTING STATUS PATIENT QL REPORTED: ABNORMAL
GLUCOSE SERPL-MCNC: 160 MG/DL (ref 70–99)
HBA1C MFR BLD: 6.4 % (ref 0–5.6)
HCO3 SERPL-SCNC: 27 MMOL/L (ref 22–29)
HCT VFR BLD AUTO: 38.7 % (ref 35–47)
HDLC SERPL-MCNC: 39 MG/DL
HGB BLD-MCNC: 12.3 G/DL (ref 11.7–15.7)
LDLC SERPL CALC-MCNC: 81 MG/DL
MCH RBC QN AUTO: 24.7 PG (ref 26.5–33)
MCHC RBC AUTO-ENTMCNC: 31.8 G/DL (ref 31.5–36.5)
MCV RBC AUTO: 78 FL (ref 78–100)
MICROALBUMIN UR-MCNC: 143.5 MG/L
MICROALBUMIN/CREAT UR: 49.14 MG/G CR (ref 0–25)
NONHDLC SERPL-MCNC: 108 MG/DL
PLATELET # BLD AUTO: 302 10E3/UL (ref 150–450)
POTASSIUM SERPL-SCNC: 4.3 MMOL/L (ref 3.4–5.3)
RBC # BLD AUTO: 4.97 10E6/UL (ref 3.8–5.2)
SODIUM SERPL-SCNC: 141 MMOL/L (ref 135–145)
TRIGL SERPL-MCNC: 135 MG/DL
WBC # BLD AUTO: 6.4 10E3/UL (ref 4–11)

## 2024-08-29 PROCEDURE — 82570 ASSAY OF URINE CREATININE: CPT | Performed by: NURSE PRACTITIONER

## 2024-08-29 PROCEDURE — 99214 OFFICE O/P EST MOD 30 MIN: CPT | Performed by: NURSE PRACTITIONER

## 2024-08-29 PROCEDURE — 80048 BASIC METABOLIC PNL TOTAL CA: CPT | Performed by: NURSE PRACTITIONER

## 2024-08-29 PROCEDURE — 83036 HEMOGLOBIN GLYCOSYLATED A1C: CPT | Performed by: NURSE PRACTITIONER

## 2024-08-29 PROCEDURE — 36415 COLL VENOUS BLD VENIPUNCTURE: CPT | Performed by: NURSE PRACTITIONER

## 2024-08-29 PROCEDURE — 80061 LIPID PANEL: CPT | Performed by: NURSE PRACTITIONER

## 2024-08-29 PROCEDURE — 82043 UR ALBUMIN QUANTITATIVE: CPT | Performed by: NURSE PRACTITIONER

## 2024-08-29 PROCEDURE — 85027 COMPLETE CBC AUTOMATED: CPT | Performed by: NURSE PRACTITIONER

## 2024-08-29 RX ORDER — METFORMIN HCL 500 MG
1000 TABLET, EXTENDED RELEASE 24 HR ORAL 2 TIMES DAILY WITH MEALS
Qty: 360 TABLET | Refills: 3 | Status: SHIPPED | OUTPATIENT
Start: 2024-08-29

## 2024-08-29 ASSESSMENT — PAIN SCALES - GENERAL: PAINLEVEL: NO PAIN (0)

## 2024-08-29 NOTE — PROGRESS NOTES
"  Assessment & Plan   Problem List Items Addressed This Visit          Endocrine    Diabetes mellitus, type 2 (H) - Primary    Relevant Medications    metFORMIN (GLUCOPHAGE XR) 500 MG 24 hr tablet    Other Relevant Orders    Lipid panel reflex to direct LDL Non-fasting    Albumin Random Urine Quantitative with Creat Ratio    HEMOGLOBIN A1C    Basic metabolic panel  (Ca, Cl, CO2, Creat, Gluc, K, Na, BUN)       Hematologic    Iron deficiency anemia secondary to inadequate dietary iron intake    Relevant Orders    CBC with platelets             BMI  Estimated body mass index is 38.28 kg/m  as calculated from the following:    Height as of this encounter: 1.626 m (5' 4\").    Weight as of this encounter: 101.2 kg (223 lb).   Weight management plan: Discussed healthy diet and exercise guidelines    See Patient Instructions      Bob Espinosa is a 33 year old, presenting for the following health issues:  Diabetes      8/29/2024     6:47 AM   Additional Questions   Roomed by Bethany     Via the Health Maintenance questionnaire, the patient has reported the following services have been completed -Eye Exam: [facility] 2024-01-14, this information has been sent to the abstraction team.  History of Present Illness       Diabetes:   She presents for follow up of diabetes.  She is checking home blood glucose one time daily.   She checks blood glucose before meals.  Blood glucose is never over 200 and never under 70.  When her blood glucose is low, the patient is asymptomatic for confusion, blurred vision, lethargy and reports not feeling dizzy, shaky, or weak.   She has no concerns regarding her diabetes at this time.   She is not experiencing numbness or burning in feet, excessive thirst, blurry vision, weight changes or redness, sores or blisters on feet. The patient has had a diabetic eye exam in the last 12 months. Eye exam performed on 01/14/2024. Location of last eye exam [facility].        She eats 2-3 servings of " "fruits and vegetables daily.She consumes 1 sweetened beverage(s) daily.She exercises with enough effort to increase her heart rate 30 to 60 minutes per day.  She exercises with enough effort to increase her heart rate 4 days per week.   She is taking medications regularly.               Review of Systems  Constitutional, HEENT, cardiovascular, pulmonary, gi and gu systems are negative, except as otherwise noted.      Objective    /70 (BP Location: Right arm)   Pulse 96   Temp 98.3  F (36.8  C) (Tympanic)   Resp 20   Ht 1.626 m (5' 4\")   Wt 101.2 kg (223 lb)   LMP 08/26/2024   SpO2 100%   BMI 38.28 kg/m    Body mass index is 38.28 kg/m .  Physical Exam   GENERAL: alert and no distress  EYES: Eyes grossly normal to inspection, PERRL and conjunctivae and sclerae normal  HENT: ear canals and TM's normal, nose and mouth without ulcers or lesions  NECK: no adenopathy, no asymmetry, masses, or scars  RESP: lungs clear to auscultation - no rales, rhonchi or wheezes  CV: regular rate and rhythm, normal S1 S2, no S3 or S4, no murmur, click or rub, no peripheral edema  ABDOMEN: soft, nontender, no hepatosplenomegaly, no masses and bowel sounds normal  MS: no gross musculoskeletal defects noted, no edema  SKIN: no suspicious lesions or rashes  NEURO: Normal strength and tone, mentation intact and speech normal  PSYCH: mentation appears normal, affect normal/bright    No results found for any visits on 08/29/24.        Signed Electronically by: PARAMJIT Martell CNP    "

## 2024-09-30 ENCOUNTER — PATIENT OUTREACH (OUTPATIENT)
Dept: CARE COORDINATION | Facility: CLINIC | Age: 34
End: 2024-09-30
Payer: COMMERCIAL

## 2024-10-14 ENCOUNTER — PATIENT OUTREACH (OUTPATIENT)
Dept: CARE COORDINATION | Facility: CLINIC | Age: 34
End: 2024-10-14
Payer: COMMERCIAL

## 2024-10-21 DIAGNOSIS — E11.9 TYPE 2 DIABETES MELLITUS WITHOUT COMPLICATION, WITHOUT LONG-TERM CURRENT USE OF INSULIN (H): ICD-10-CM

## 2024-10-22 RX ORDER — SEMAGLUTIDE 2.68 MG/ML
INJECTION, SOLUTION SUBCUTANEOUS
Qty: 3 ML | Refills: 3 | Status: SHIPPED | OUTPATIENT
Start: 2024-10-22

## 2024-11-07 ENCOUNTER — PATIENT OUTREACH (OUTPATIENT)
Dept: CARE COORDINATION | Facility: CLINIC | Age: 34
End: 2024-11-07
Payer: COMMERCIAL

## 2024-12-29 ENCOUNTER — HEALTH MAINTENANCE LETTER (OUTPATIENT)
Age: 34
End: 2024-12-29

## 2025-05-15 DIAGNOSIS — E11.9 TYPE 2 DIABETES MELLITUS WITHOUT COMPLICATION, WITHOUT LONG-TERM CURRENT USE OF INSULIN (H): ICD-10-CM

## 2025-05-15 NOTE — TELEPHONE ENCOUNTER
Medication Question or Refill      What medication are you calling about (include dose and sig)?: Mounjaro 7.5 mg     Preferred Pharmacy: [facility] pharmacy     Who prescribed the medication?: Anette Fung      Do you need a refill? Yes    RUDDY Middleton

## 2025-05-29 ENCOUNTER — TELEPHONE (OUTPATIENT)
Dept: FAMILY MEDICINE | Facility: CLINIC | Age: 35
End: 2025-05-29
Payer: COMMERCIAL

## 2025-05-29 NOTE — TELEPHONE ENCOUNTER
Patient Quality Outreach    Patient is due for the following:   Diabetes -  Eye Exam    Action(s) Taken:   Patient declined follow up at this time.    Type of outreach:    Chart review performed, no outreach needed.    Questions for provider review:    None         Bethany Alva CMA  Chart routed to None.

## 2025-05-31 ENCOUNTER — HEALTH MAINTENANCE LETTER (OUTPATIENT)
Age: 35
End: 2025-05-31